# Patient Record
Sex: FEMALE | Race: WHITE | Employment: OTHER | URBAN - NONMETROPOLITAN AREA
[De-identification: names, ages, dates, MRNs, and addresses within clinical notes are randomized per-mention and may not be internally consistent; named-entity substitution may affect disease eponyms.]

---

## 2017-12-14 ENCOUNTER — HOSPITAL ENCOUNTER (OUTPATIENT)
Dept: MAMMOGRAPHY | Age: 62
Discharge: HOME | End: 2017-12-14
Payer: MEDICARE

## 2017-12-14 DIAGNOSIS — Z12.31 VISIT FOR SCREENING MAMMOGRAM: ICD-10-CM

## 2017-12-14 PROCEDURE — G0202 SCR MAMMO BI INCL CAD: HCPCS

## 2018-05-12 ENCOUNTER — APPOINTMENT (OUTPATIENT)
Dept: GENERAL RADIOLOGY | Age: 63
End: 2018-05-12
Payer: MEDICARE

## 2018-05-12 ENCOUNTER — HOSPITAL ENCOUNTER (EMERGENCY)
Age: 63
Discharge: HOME | End: 2018-05-12
Payer: MEDICARE

## 2018-05-12 VITALS
RESPIRATION RATE: 20 BRPM | TEMPERATURE: 98.1 F | OXYGEN SATURATION: 100 % | HEART RATE: 100 BPM | DIASTOLIC BLOOD PRESSURE: 80 MMHG | SYSTOLIC BLOOD PRESSURE: 134 MMHG | BODY MASS INDEX: 23.99 KG/M2 | HEIGHT: 65 IN | WEIGHT: 144 LBS

## 2018-05-12 DIAGNOSIS — R05.3 COUGH, PERSISTENT: Primary | ICD-10-CM

## 2018-05-12 PROCEDURE — 71046 X-RAY EXAM CHEST 2 VIEWS: CPT

## 2018-05-12 PROCEDURE — 99283 EMERGENCY DEPT VISIT LOW MDM: CPT

## 2018-05-12 RX ORDER — BENZONATATE 100 MG/1
100 CAPSULE ORAL 3 TIMES DAILY PRN
Qty: 21 CAPSULE | Refills: 0 | Status: SHIPPED | OUTPATIENT
Start: 2018-05-12 | End: 2018-05-19

## 2018-05-12 ASSESSMENT — ENCOUNTER SYMPTOMS
RHINORRHEA: 1
BACK PAIN: 0
EYE PAIN: 0
SHORTNESS OF BREATH: 0
VOMITING: 0
WHEEZING: 0
SORE THROAT: 0
ABDOMINAL PAIN: 0
DIARRHEA: 0
EYE DISCHARGE: 0
NAUSEA: 0
COUGH: 1

## 2018-12-20 ENCOUNTER — HOSPITAL ENCOUNTER (OUTPATIENT)
Dept: MAMMOGRAPHY | Age: 63
Discharge: HOME | End: 2018-12-20
Payer: MEDICARE

## 2018-12-20 DIAGNOSIS — Z12.39 BREAST CANCER SCREENING: ICD-10-CM

## 2018-12-20 PROCEDURE — 77067 SCR MAMMO BI INCL CAD: CPT

## 2018-12-28 ENCOUNTER — HOSPITAL ENCOUNTER (OUTPATIENT)
Dept: WOMENS IMAGING | Age: 63
Discharge: HOME | End: 2018-12-28
Payer: MEDICARE

## 2018-12-28 DIAGNOSIS — R92.1 BREAST CALCIFICATIONS: ICD-10-CM

## 2018-12-28 PROCEDURE — G0279 TOMOSYNTHESIS, MAMMO: HCPCS

## 2019-01-22 ENCOUNTER — HOSPITAL ENCOUNTER (OUTPATIENT)
Dept: WOMENS IMAGING | Age: 64
Discharge: HOME | End: 2019-01-22
Payer: MEDICARE

## 2019-01-22 DIAGNOSIS — R92.1 BREAST CALCIFICATIONS: ICD-10-CM

## 2019-01-22 PROCEDURE — 77065 DX MAMMO INCL CAD UNI: CPT

## 2019-01-22 PROCEDURE — A4648 IMPLANTABLE TISSUE MARKER: HCPCS

## 2019-01-22 PROCEDURE — 19081 BX BREAST 1ST LESION STRTCTC: CPT

## 2019-01-22 PROCEDURE — 88305 TISSUE EXAM BY PATHOLOGIST: CPT

## 2019-01-22 PROCEDURE — 2720000010 HC SURG SUPPLY STERILE

## 2019-01-22 PROCEDURE — 2709999900 HC NON-CHARGEABLE SUPPLY

## 2019-07-24 ENCOUNTER — HOSPITAL ENCOUNTER (OUTPATIENT)
Dept: WOMENS IMAGING | Age: 64
Discharge: HOME | End: 2019-07-24
Payer: MEDICARE

## 2019-07-24 DIAGNOSIS — Z09 FOLLOW-UP EXAM: ICD-10-CM

## 2019-07-24 DIAGNOSIS — R92.1 BREAST CALCIFICATION SEEN ON MAMMOGRAM: ICD-10-CM

## 2019-07-24 PROCEDURE — G0279 TOMOSYNTHESIS, MAMMO: HCPCS

## 2020-01-10 ENCOUNTER — HOSPITAL ENCOUNTER (OUTPATIENT)
Dept: WOMENS IMAGING | Age: 65
Discharge: HOME | End: 2020-01-10
Payer: MEDICARE

## 2020-01-10 PROCEDURE — 77063 BREAST TOMOSYNTHESIS BI: CPT

## 2020-11-15 ENCOUNTER — HOSPITAL ENCOUNTER (EMERGENCY)
Age: 65
Discharge: HOME | End: 2020-11-16
Payer: MEDICARE

## 2020-11-15 VITALS
RESPIRATION RATE: 17 BRPM | SYSTOLIC BLOOD PRESSURE: 137 MMHG | TEMPERATURE: 97.8 F | BODY MASS INDEX: 23.99 KG/M2 | WEIGHT: 144 LBS | DIASTOLIC BLOOD PRESSURE: 71 MMHG | OXYGEN SATURATION: 99 % | HEART RATE: 110 BPM | HEIGHT: 65 IN

## 2020-11-15 PROCEDURE — 80053 COMPREHEN METABOLIC PANEL: CPT

## 2020-11-15 PROCEDURE — G0480 DRUG TEST DEF 1-7 CLASSES: HCPCS

## 2020-11-15 PROCEDURE — 84443 ASSAY THYROID STIM HORMONE: CPT

## 2020-11-15 PROCEDURE — 82248 BILIRUBIN DIRECT: CPT

## 2020-11-15 PROCEDURE — 99284 EMERGENCY DEPT VISIT MOD MDM: CPT

## 2020-11-15 PROCEDURE — 36415 COLL VENOUS BLD VENIPUNCTURE: CPT

## 2020-11-15 PROCEDURE — 85025 COMPLETE CBC W/AUTO DIFF WBC: CPT

## 2020-11-16 ENCOUNTER — APPOINTMENT (OUTPATIENT)
Dept: CT IMAGING | Age: 65
End: 2020-11-16
Payer: MEDICARE

## 2020-11-16 LAB
ACETAMINOPHEN LEVEL: < 5 UG/ML (ref 0–20)
ALBUMIN SERPL-MCNC: 4.2 G/DL (ref 3.5–5.1)
ALP BLD-CCNC: 90 U/L (ref 38–126)
ALT SERPL-CCNC: 27 U/L (ref 11–66)
AMMONIA: 23 UMOL/L (ref 11–60)
ANION GAP SERPL CALCULATED.3IONS-SCNC: 15 MEQ/L (ref 8–16)
AST SERPL-CCNC: 23 U/L (ref 5–40)
BASOPHILS # BLD: 0.5 %
BASOPHILS ABSOLUTE: 0.1 THOU/MM3 (ref 0–0.1)
BILIRUB SERPL-MCNC: 0.2 MG/DL (ref 0.3–1.2)
BILIRUBIN DIRECT: < 0.2 MG/DL (ref 0–0.3)
BUN BLDV-MCNC: 13 MG/DL (ref 7–22)
CALCIUM SERPL-MCNC: 9.7 MG/DL (ref 8.5–10.5)
CHLORIDE BLD-SCNC: 101 MEQ/L (ref 98–111)
CO2: 23 MEQ/L (ref 23–33)
CREAT SERPL-MCNC: 0.8 MG/DL (ref 0.4–1.2)
EOSINOPHIL # BLD: 2.3 %
EOSINOPHILS ABSOLUTE: 0.3 THOU/MM3 (ref 0–0.4)
ERYTHROCYTE [DISTWIDTH] IN BLOOD BY AUTOMATED COUNT: 12.3 % (ref 11.5–14.5)
ERYTHROCYTE [DISTWIDTH] IN BLOOD BY AUTOMATED COUNT: 40.1 FL (ref 35–45)
ETHYL ALCOHOL, SERUM: < 0.01 %
GFR SERPL CREATININE-BSD FRML MDRD: 72 ML/MIN/1.73M2
GLUCOSE BLD-MCNC: 180 MG/DL (ref 70–108)
HCT VFR BLD CALC: 40.3 % (ref 37–47)
HEMOGLOBIN: 13.4 GM/DL (ref 12–16)
IMMATURE GRANS (ABS): 0.06 THOU/MM3 (ref 0–0.07)
IMMATURE GRANULOCYTES: 0.5 %
LYMPHOCYTES # BLD: 18.2 %
LYMPHOCYTES ABSOLUTE: 2 THOU/MM3 (ref 1–4.8)
MCH RBC QN AUTO: 29.9 PG (ref 26–33)
MCHC RBC AUTO-ENTMCNC: 33.3 GM/DL (ref 32.2–35.5)
MCV RBC AUTO: 90 FL (ref 81–99)
MONOCYTES # BLD: 5.9 %
MONOCYTES ABSOLUTE: 0.7 THOU/MM3 (ref 0.4–1.3)
NUCLEATED RED BLOOD CELLS: 0 /100 WBC
OSMOLALITY CALCULATION: 282.2 MOSMOL/KG (ref 275–300)
PLATELET # BLD: 281 THOU/MM3 (ref 130–400)
PMV BLD AUTO: 9.7 FL (ref 9.4–12.4)
POTASSIUM SERPL-SCNC: 3.1 MEQ/L (ref 3.5–5.2)
RBC # BLD: 4.48 MILL/MM3 (ref 4.2–5.4)
SALICYLATE, SERUM: < 0.3 MG/DL (ref 2–10)
SEG NEUTROPHILS: 72.6 %
SEGMENTED NEUTROPHILS ABSOLUTE COUNT: 8.1 THOU/MM3 (ref 1.8–7.7)
SODIUM BLD-SCNC: 139 MEQ/L (ref 135–145)
TOTAL PROTEIN: 6.9 G/DL (ref 6.1–8)
TSH SERPL DL<=0.05 MIU/L-ACNC: 6.33 UIU/ML (ref 0.4–4.2)
WBC # BLD: 11.1 THOU/MM3 (ref 4.8–10.8)

## 2020-11-16 PROCEDURE — 36415 COLL VENOUS BLD VENIPUNCTURE: CPT

## 2020-11-16 PROCEDURE — 82140 ASSAY OF AMMONIA: CPT

## 2020-11-16 PROCEDURE — 70450 CT HEAD/BRAIN W/O DYE: CPT

## 2020-11-16 ASSESSMENT — SLEEP AND FATIGUE QUESTIONNAIRES
DO YOU HAVE DIFFICULTY SLEEPING: NO
DO YOU USE A SLEEP AID: NO

## 2020-11-16 NOTE — ED NOTES
Pt to ED via private vehicle. Daughter states that her mother has been placed on ativan 4x daily for anxiety, but that she does not trust her to take her medications responsibility so she gives it to her when appropriate. Today, the daughter forgot the medications on the counter and when she went back inside, about 15 pills were missing. Daughter does not believe the pt ingested them and pt also denies ingesting them, both believing they just got flushed. Daughter also states that recently the pt has been hallucinating, stating that she is seeing bugs crawling on the walls or seeing things that aren't there. Pt states this is not true. Daughter brought her here today because she is concerned she is \"sliping\" back into her old problems. Pt denies any suicidal/homicidal thoughts or feelings. Pt denying pain at this time.  Being monitored by staff     Nella Junior  11/16/20 0002

## 2020-11-16 NOTE — ED NOTES
Pt in bed with both side rails down. Patient is in bed with blankets. Patient reports. Patient is in ligature resistant safe room and officer/ are in the control room watching the monitor at this time. Patient updated on plan of care. Will continue to monitor.        Frank Manning RN  11/16/20 1926

## 2020-11-16 NOTE — ED NOTES
Pt in bed with both side rails down. Patient is in bed with blankets. Patient reports. Patient is in ligature resistant safe room and officer/ are in the control room watching the monitor at this time. Patient updated on plan of care. Will continue to monitor.        Placido Fajardo RN  11/16/20 0939

## 2020-11-16 NOTE — PROGRESS NOTES
Provisional Diagnosis:    Bi-polar Disorder, per history     Risk, Psychosocial and Contextual Factors:  Situational Stressors    Current MH Treatment:  PCP      Present Suicidal Behavior:      Verbal:    Denies          Attempt:   Denies      Access to Weapons:   Denies    Current Suicide Risk: Low, Moderate or High:    Low    Past Suicidal Behavior:       Verbal:    Denies    Attempt:   Denies     Self-Injurious/Self-Mutilation:  Denies     Traumatic Event Within Past 2 Weeks:   Denies    Current Abuse:   Denies     Legal:     Denies    Violence:    Denies     Protective Factors: Motivated    Housing:       Lives with daughter and boyfriend     CPAP/Oxygen/Ambulation Difficulties:     Basic Vital Signs Normal?: Check with Patients Nurse prior to Calling Psychiatry    Critical Labs?: Check with Patients Nurse prior to Calling Psychiatry    Clinical Summary:      Patient is a 63year old female who presents to the ED voluntarily due to hallucinations. Patients daughter reports patient has been hallucinating and stating she is seeing bugs. Patient hallucinations at this time. Patients daughter also reports 15 pills were missing from patients medication bottle. Patients daughter and patient denies patient ingested medication. Patients daughter reports she is also concerned patient will begin to have seizures due to her history. Patient reports she was previously admitted and having seizures due to not having medication. Homicidal thoughts and/or plans denied. No delusions noted. AOD denied. Patient alert and oriented x4. Patient cooperative at this time. Level of Care Disposition:      Consulted with medical provider Chen Pompa. Patient is medically cleared. Consulted with patients RN about abnormalities or medical concerns. No abnormalities or medical concerns noted. Consulted with Dr. Jesus Kendall. Patient to be discharged and follow up outpatient.

## 2020-11-18 ASSESSMENT — ENCOUNTER SYMPTOMS
CHEST TIGHTNESS: 0
BACK PAIN: 0
COUGH: 0
RHINORRHEA: 0

## 2020-11-18 NOTE — ED PROVIDER NOTES
[facility] Emergency Department    CHIEF COMPLAINT       Chief Complaint   Patient presents with    Hallucinations       Nurses Notes reviewed and I agree except as noted in the HPI. HISTORY OF PRESENT ILLNESS    Ailyn Donato truman 63 y.o. female who presents to the ED for evaluation of  Hallucinations. Her daughter brought her to the ER because she states that her mom has been having odd behaviors such as cleaning around the couch in a [place], \"trimming\" the carpet, and repeatedly tearing apart a purse because she believes there are pills in the purse. The daughter states that she has control of the patient's benzos because of the patient's history of misuse. The daughter states that she left the pills on the counter for a few minutes and came back and found that there were 15-20 pills missing. The patient denies taking them. The daughter is concerned the patient will have a seizure like she did 4 years ago (patient was admitted in acute benzo withdrawal). The patient denies SI/HI, AH/VH. HPI was provided by the patient. REVIEW OF SYSTEMS     Review of Systems   Constitutional: Negative for chills, fatigue and fever. HENT: Negative for congestion, ear discharge, ear pain, postnasal drip and rhinorrhea. Respiratory: Negative for cough and chest tightness. Genitourinary: Negative for difficulty urinating, dysuria, enuresis, flank pain and hematuria. Musculoskeletal: Negative for back pain and joint swelling. Neurological: Negative for dizziness, light-headedness, numbness and headaches. Psychiatric/Behavioral: Positive for agitation, behavioral problems, dysphoric mood and hallucinations. Negative for confusion and suicidal ideas.         PAST MEDICAL HISTORY     Past Medical History:   Diagnosis Date    Anxiety     [facility] (chronic obstructive pulmonary disease) ([facility] [address]     GERD (gastroesophageal reflux disease)     Hyperlipemia     Hypertension     Psychiatric problem     Seizures ([facility] [address]     Thyroid disease        SURGICALHISTORY      has a past surgical history that includes Abdomen surgery; Colonoscopy; and eye surgery (2010). CURRENT MEDICATIONS       Discharge Medication List as of 2020  2:30 AM      CONTINUE these medications which have NOT CHANGED    Details   aspirin 81 MG EC tablet Take 1 tablet by mouth daily      atorvastatin (LIPITOR) 40 MG tablet Take 1 tablet by mouth every evening, Disp-30 tablet, R-0      nortriptyline (PAMELOR) 75 MG capsule Take 75 mg by mouth nightly      PARoxetine (PAXIL) 20 MG tablet Take 20 mg by mouth every morning      budesonide-formoterol (SYMBICORT) 160-4.5 MCG/ACT AERO Inhale 2 puffs into the lungs 2 times daily      lisinopril (PRINIVIL;ZESTRIL) 10 MG tablet Take 5 mg by mouth daily      levothyroxine (SYNTHROID) 50 MCG tablet Take 1 tablet by mouth Daily, Disp-30 tablet, R-3      omeprazole (PRILOSEC) 20 MG capsule Take 20 mg by mouth 2 times daily as needed       fluticasone (FLONASE) 50 MCG/ACT nasal spray 2 sprays by Nasal route daily Indications: Use two sprays in each nostril once daily             ALLERGIES     is allergic to penicillins. FAMILY HISTORY     She indicated that her mother is . She indicated that her father is . She indicated that the status of her neg hx is unknown.   family history includes Heart Disease in her mother; High Blood Pressure in her mother; High Cholesterol in her mother; Kidney Disease in her father.     SOCIAL HISTORY       Social History     Socioeconomic History    Marital status:      Spouse name: Not on file    Number of children: Not on file    Years of education: Not on file    Highest education level: Not on file   Occupational History    Not on file   Social Needs    Financial resource strain: Not on file    Food insecurity     Worry: Not on file     Inability: Not on file    Transportation needs     Medical: Not on file     Non-medical: Not on file   Tobacco Use    Smoking status: Former Smoker     Packs/day: 0.50     Years: 3.00     Pack years: 1.50    Smokeless tobacco: Never Used   Substance and Sexual Activity    Alcohol use: No    Drug use: No    Sexual activity: Not Currently   Lifestyle    Physical activity     Days per week: Not on file     Minutes per session: Not on file    Stress: Not on file   Relationships    Social connections     Talks on phone: Not on file     Gets together: Not on file     Attends [employer] service: Not on file     Active member of club or organization: Not on file     Attends meetings of clubs or organizations: Not on file     Relationship status: Not on file    Intimate partner violence     Fear of current or ex partner: Not on file     Emotionally abused: Not on file     Physically abused: Not on file     Forced sexual activity: Not on file   Other Topics Concern    Not on file   Social History Narrative    Not on file       PHYSICAL EXAM     INITIAL VITALS:  height is 5' 5\" (1.651 m) and weight is 144 lb (65.3 kg). Her oral temperature is 97.8 °F (36.6 °C). Her blood pressure is 137/71 and her pulse is 110. Her respiration is 17 and oxygen saturation is 99%. Physical Exam  Vitals signs and nursing note reviewed. Constitutional:       General: She is not in acute distress. Appearance: Normal appearance. She is well-developed. She is not diaphoretic. Comments: Disheveled   HENT:      Head: Normocephalic and atraumatic. Eyes:      General:         Right eye: No discharge. Left eye: No discharge. Conjunctiva/sclera: Conjunctivae normal.   Neck:      Musculoskeletal: Normal range of motion. Trachea: No tracheal deviation. Cardiovascular:      Rate and Rhythm: Regular rhythm. Tachycardia present. Heart sounds: Normal heart sounds. No murmur. No gallop. Comments: Normal capillary refill  Pulmonary:      Effort: Pulmonary effort is normal. No respiratory distress. PANEL - Abnormal; Notable for the following components:       Result Value    Potassium 3.1 (*)     Glucose 180 (*)     All other components within normal limits   CBC WITH AUTO DIFFERENTIAL - Abnormal; Notable for the following components:    WBC 11.1 (*)     Segs Absolute 8.1 (*)     All other components within normal limits   HEPATIC FUNCTION PANEL - Abnormal; Notable for the following components: Total Bilirubin 0.2 (*)     All other components within normal limits   TSH WITHOUT REFLEX - Abnormal; Notable for the following components:    TSH 6.330 (*)     All other components within normal limits   SALICYLATE LEVEL - Abnormal; Notable for the following components:    Salicylate, Serum < 0.3 (*)     All other components within normal limits   GLOMERULAR FILTRATION RATE, ESTIMATED - Abnormal; Notable for the following components:    Est, Glom Filt Rate 72 (*)     All other components within normal limits   ACETAMINOPHEN LEVEL   ETHANOL   AMMONIA   ANION GAP   OSMOLALITY   URINE DRUG SCREEN   URINE RT REFLEX TO CULTURE       EMERGENCY DEPARTMENT COURSE:   Vitals:    Vitals:    11/15/20 2331   BP: 137/71   Pulse: 110   Resp: 17   Temp: 97.8 °F (36.6 °C)   TempSrc: Oral   SpO2: 99%   Weight: 144 lb (65.3 kg)   Height: 5' 5\" (1.651 m)          [facility]                  ED Course as of Nov 18 0443   Mon Nov 16, 2020   0225 Patient declines to provide urine. Declines straight cath    [KJ]      ED Course User Index  [KJ] SUSAN Eid CNP       The patient was seen and evaluated within the ED today for the evaluation of suicidal ideation. Physical exam revealed no significant abnormalities or concerns. I completed a medical evaluation of the patient and ordered appropriate labs which were unremarkable. CRISTI and social work completed a full psychiatric evaluation of the patient and determined that she met discharge criteria. I medically cleared the patient.  CRISTI and social work's noted should be consulted for the psychiatric evaluation and reason for discharge. I reviewed the family's concerns for seizure with them. Four years ago the patient had benzodiazapine withdrawal because she was taking her entire months worth of benzos within a week. I explained the mechanism of benzo withdrawal and educated daughter about the importance of her not stopping benzo use suddenly. They verbalized understanding and are comfortable with discharge. The patient denies her daughter's reports of hallucinations but does report having some thoughts about her floor being dirty etc.         Medications - No data to display      Patient was seenindependently by myself. The patient's final impression and disposition and plan was determined by myself. CRITICAL CARE:   None    CONSULTS:  None    PROCEDURES:  None    FINAL IMPRESSION     1. Abnormal behavior    2. History of benzodiazepine use    3. Obsessive behavior    4.  Hallucination          DISPOSITION/PLAN   DISPOSITION Decision To Discharge 11/16/2020 02:26:16 AM      PATIENT REFERREDTO:  Erma Armendariz 68  [facility] [address] [phone]    Schedule an appointment as soon as possible for a visit in 2 days  For follow up      DISCHARGE MEDICATIONS:  Discharge Medication List as of 11/16/2020  2:30 AM          (Please note that portions of this note were completed with a voice recognition program.  Efforts were made to edit the dictations but occasionally words are mis-transcribed.)         SUSAN Lopes CNP, APRN - CNP  11/18/20 7468

## 2021-01-18 ENCOUNTER — HOSPITAL ENCOUNTER (OUTPATIENT)
Dept: MAMMOGRAPHY | Age: 66
Discharge: HOME | End: 2021-01-18
Payer: MEDICARE

## 2021-01-18 DIAGNOSIS — Z12.31 ENCOUNTER FOR SCREENING MAMMOGRAM FOR MALIGNANT NEOPLASM OF BREAST: ICD-10-CM

## 2021-01-18 PROCEDURE — 77063 BREAST TOMOSYNTHESIS BI: CPT

## 2021-06-25 ENCOUNTER — HOSPITAL ENCOUNTER (OUTPATIENT)
Age: 66
Setting detail: OBSERVATION
Discharge: HOME | End: 2021-06-27
Attending: INTERNAL MEDICINE | Admitting: INTERNAL MEDICINE
Payer: MEDICARE

## 2021-06-25 ENCOUNTER — APPOINTMENT (OUTPATIENT)
Dept: GENERAL RADIOLOGY | Age: 66
End: 2021-06-25
Payer: MEDICARE

## 2021-06-25 ENCOUNTER — APPOINTMENT (OUTPATIENT)
Dept: CT IMAGING | Age: 66
End: 2021-06-25
Payer: MEDICARE

## 2021-06-25 DIAGNOSIS — F13.932 BENZODIAZEPINE WITHDRAWAL WITH PERCEPTUAL DISTURBANCE (HCC): Primary | ICD-10-CM

## 2021-06-25 LAB
ALBUMIN SERPL-MCNC: 4.5 G/DL (ref 3.5–5.1)
ALP BLD-CCNC: 91 U/L (ref 38–126)
ALT SERPL-CCNC: 19 U/L (ref 11–66)
ANION GAP SERPL CALCULATED.3IONS-SCNC: 15 MEQ/L (ref 8–16)
APTT: 29.5 SECONDS (ref 22–38)
AST SERPL-CCNC: 17 U/L (ref 5–40)
BASOPHILS # BLD: 0.4 %
BASOPHILS ABSOLUTE: 0 THOU/MM3 (ref 0–0.1)
BILIRUB SERPL-MCNC: 0.2 MG/DL (ref 0.3–1.2)
BILIRUBIN DIRECT: < 0.2 MG/DL (ref 0–0.3)
BUN BLDV-MCNC: 11 MG/DL (ref 7–22)
CALCIUM SERPL-MCNC: 10 MG/DL (ref 8.5–10.5)
CHLORIDE BLD-SCNC: 102 MEQ/L (ref 98–111)
CO2: 25 MEQ/L (ref 23–33)
CREAT SERPL-MCNC: 0.7 MG/DL (ref 0.4–1.2)
EOSINOPHIL # BLD: 0.5 %
EOSINOPHILS ABSOLUTE: 0.1 THOU/MM3 (ref 0–0.4)
ERYTHROCYTE [DISTWIDTH] IN BLOOD BY AUTOMATED COUNT: 12.9 % (ref 11.5–14.5)
ERYTHROCYTE [DISTWIDTH] IN BLOOD BY AUTOMATED COUNT: 42.4 FL (ref 35–45)
GFR SERPL CREATININE-BSD FRML MDRD: 84 ML/MIN/1.73M2
GLUCOSE BLD-MCNC: 178 MG/DL (ref 70–108)
HCT VFR BLD CALC: 42.4 % (ref 37–47)
HEMOGLOBIN: 13.3 GM/DL (ref 12–16)
IMMATURE GRANS (ABS): 0.05 THOU/MM3 (ref 0–0.07)
IMMATURE GRANULOCYTES: 0.4 %
INR BLD: 0.97 (ref 0.85–1.13)
LIPASE: 25.7 U/L (ref 5.6–51.3)
LYMPHOCYTES # BLD: 11.7 %
LYMPHOCYTES ABSOLUTE: 1.4 THOU/MM3 (ref 1–4.8)
MAGNESIUM: 1.6 MG/DL (ref 1.6–2.4)
MCH RBC QN AUTO: 28.1 PG (ref 26–33)
MCHC RBC AUTO-ENTMCNC: 31.4 GM/DL (ref 32.2–35.5)
MCV RBC AUTO: 89.5 FL (ref 81–99)
MONOCYTES # BLD: 5.1 %
MONOCYTES ABSOLUTE: 0.6 THOU/MM3 (ref 0.4–1.3)
NUCLEATED RED BLOOD CELLS: 0 /100 WBC
OSMOLALITY CALCULATION: 286.9 MOSMOL/KG (ref 275–300)
PLATELET # BLD: 286 THOU/MM3 (ref 130–400)
PMV BLD AUTO: 10 FL (ref 9.4–12.4)
POTASSIUM SERPL-SCNC: 3.5 MEQ/L (ref 3.5–5.2)
PRO-BNP: 87.8 PG/ML (ref 0–900)
RBC # BLD: 4.74 MILL/MM3 (ref 4.2–5.4)
SEG NEUTROPHILS: 81.9 %
SEGMENTED NEUTROPHILS ABSOLUTE COUNT: 9.7 THOU/MM3 (ref 1.8–7.7)
SODIUM BLD-SCNC: 142 MEQ/L (ref 135–145)
TOTAL PROTEIN: 7.4 G/DL (ref 6.1–8)
TROPONIN T: < 0.01 NG/ML
TSH SERPL DL<=0.05 MIU/L-ACNC: 3.46 UIU/ML (ref 0.4–4.2)
WBC # BLD: 11.9 THOU/MM3 (ref 4.8–10.8)

## 2021-06-25 PROCEDURE — 85610 PROTHROMBIN TIME: CPT

## 2021-06-25 PROCEDURE — 83735 ASSAY OF MAGNESIUM: CPT

## 2021-06-25 PROCEDURE — 83880 ASSAY OF NATRIURETIC PEPTIDE: CPT

## 2021-06-25 PROCEDURE — 93005 ELECTROCARDIOGRAM TRACING: CPT | Performed by: NURSE PRACTITIONER

## 2021-06-25 PROCEDURE — 82248 BILIRUBIN DIRECT: CPT

## 2021-06-25 PROCEDURE — 84443 ASSAY THYROID STIM HORMONE: CPT

## 2021-06-25 PROCEDURE — 70450 CT HEAD/BRAIN W/O DYE: CPT

## 2021-06-25 PROCEDURE — 99283 EMERGENCY DEPT VISIT LOW MDM: CPT

## 2021-06-25 PROCEDURE — 80053 COMPREHEN METABOLIC PANEL: CPT

## 2021-06-25 PROCEDURE — 2580000003 HC RX 258: Performed by: NURSE PRACTITIONER

## 2021-06-25 PROCEDURE — 83690 ASSAY OF LIPASE: CPT

## 2021-06-25 PROCEDURE — 85730 THROMBOPLASTIN TIME PARTIAL: CPT

## 2021-06-25 PROCEDURE — 84484 ASSAY OF TROPONIN QUANT: CPT

## 2021-06-25 PROCEDURE — 36415 COLL VENOUS BLD VENIPUNCTURE: CPT

## 2021-06-25 PROCEDURE — 96374 THER/PROPH/DIAG INJ IV PUSH: CPT

## 2021-06-25 PROCEDURE — 85025 COMPLETE CBC W/AUTO DIFF WBC: CPT

## 2021-06-25 PROCEDURE — 71045 X-RAY EXAM CHEST 1 VIEW: CPT

## 2021-06-25 RX ORDER — LORAZEPAM 1 MG/1
1 TABLET ORAL 3 TIMES DAILY
COMMUNITY

## 2021-06-25 RX ORDER — LORAZEPAM 2 MG/ML
0.5 INJECTION INTRAMUSCULAR ONCE
Status: COMPLETED | OUTPATIENT
Start: 2021-06-26 | End: 2021-06-26

## 2021-06-25 RX ORDER — 0.9 % SODIUM CHLORIDE 0.9 %
1000 INTRAVENOUS SOLUTION INTRAVENOUS ONCE
Status: COMPLETED | OUTPATIENT
Start: 2021-06-25 | End: 2021-06-26

## 2021-06-25 RX ADMIN — SODIUM CHLORIDE 1000 ML: 9 INJECTION, SOLUTION INTRAVENOUS at 23:23

## 2021-06-25 ASSESSMENT — ENCOUNTER SYMPTOMS
CHEST TIGHTNESS: 0
CONSTIPATION: 0
SINUS PRESSURE: 0
NAUSEA: 1
SINUS PAIN: 0
SORE THROAT: 0
VOICE CHANGE: 0
VOMITING: 0
PHOTOPHOBIA: 0
EYE PAIN: 0
RHINORRHEA: 0
ABDOMINAL PAIN: 0
COUGH: 0
DIARRHEA: 0
STRIDOR: 0
SHORTNESS OF BREATH: 1
COLOR CHANGE: 0
BACK PAIN: 0
WHEEZING: 0

## 2021-06-25 NOTE — Clinical Note
Patient Class: Observation [104]   REQUIRED: Diagnosis: Benzodiazepine withdrawal with perceptual disturbance ([facility] [address] [[id number]]   Estimated Length of Stay: Estimated stay of less than 2 midnights   Admitting Provider: Timo Fernandez [[id number]]   Telemetry/Cardiac Monitoring Required?: Yes

## 2021-06-26 PROBLEM — F13.932 BENZODIAZEPINE WITHDRAWAL WITH PERCEPTUAL DISTURBANCE (HCC): Status: ACTIVE | Noted: 2021-06-26

## 2021-06-26 LAB
AMPHETAMINE+METHAMPHETAMINE URINE SCREEN: NEGATIVE
AMPHETAMINE+METHAMPHETAMINE URINE SCREEN: NEGATIVE
BACTERIA: ABNORMAL /HPF
BARBITURATE QUANTITATIVE URINE: NEGATIVE
BARBITURATE QUANTITATIVE URINE: NEGATIVE
BENZODIAZEPINE QUANTITATIVE URINE: NEGATIVE
BENZODIAZEPINE QUANTITATIVE URINE: NEGATIVE
BILIRUBIN URINE: NEGATIVE
BLOOD, URINE: NEGATIVE
CANNABINOID QUANTITATIVE URINE: NEGATIVE
CANNABINOID QUANTITATIVE URINE: NEGATIVE
CASTS 2: ABNORMAL /LPF
CASTS UA: ABNORMAL /LPF
CHARACTER, URINE: CLEAR
COCAINE METABOLITE QUANTITATIVE URINE: NEGATIVE
COCAINE METABOLITE QUANTITATIVE URINE: NEGATIVE
COLOR: YELLOW
CRYSTALS, UA: ABNORMAL
EKG ATRIAL RATE: 107 BPM
EKG P AXIS: 42 DEGREES
EKG P-R INTERVAL: 152 MS
EKG Q-T INTERVAL: 348 MS
EKG QRS DURATION: 106 MS
EKG QTC CALCULATION (BAZETT): 464 MS
EKG R AXIS: 10 DEGREES
EKG T AXIS: 25 DEGREES
EKG VENTRICULAR RATE: 107 BPM
EPITHELIAL CELLS, UA: ABNORMAL /HPF
GLUCOSE BLD-MCNC: 161 MG/DL (ref 70–108)
GLUCOSE BLD-MCNC: 72 MG/DL (ref 70–108)
GLUCOSE BLD-MCNC: 92 MG/DL (ref 70–108)
GLUCOSE BLD-MCNC: 94 MG/DL (ref 70–108)
GLUCOSE URINE: NEGATIVE MG/DL
KETONES, URINE: NEGATIVE
LEUKOCYTE ESTERASE, URINE: ABNORMAL
MISCELLANEOUS 2: ABNORMAL
MUCUS: ABNORMAL
NITRITE, URINE: NEGATIVE
OPIATES, URINE: NEGATIVE
OPIATES, URINE: NEGATIVE
OXYCODONE: NEGATIVE
OXYCODONE: NEGATIVE
PH UA: 7.5 (ref 5–9)
PHENCYCLIDINE QUANTITATIVE URINE: NEGATIVE
PHENCYCLIDINE QUANTITATIVE URINE: POSITIVE
PROTEIN UA: NEGATIVE
RBC URINE: ABNORMAL /HPF
RENAL EPITHELIAL, UA: ABNORMAL
SPECIFIC GRAVITY, URINE: 1.02 (ref 1–1.03)
UROBILINOGEN, URINE: 1 EU/DL (ref 0–1)
WBC UA: ABNORMAL /HPF
YEAST: ABNORMAL

## 2021-06-26 PROCEDURE — 96375 TX/PRO/DX INJ NEW DRUG ADDON: CPT

## 2021-06-26 PROCEDURE — 96372 THER/PROPH/DIAG INJ SC/IM: CPT

## 2021-06-26 PROCEDURE — 96365 THER/PROPH/DIAG IV INF INIT: CPT

## 2021-06-26 PROCEDURE — 82948 REAGENT STRIP/BLOOD GLUCOSE: CPT

## 2021-06-26 PROCEDURE — G0378 HOSPITAL OBSERVATION PER HR: HCPCS

## 2021-06-26 PROCEDURE — 87186 SC STD MICRODIL/AGAR DIL: CPT

## 2021-06-26 PROCEDURE — 93010 ELECTROCARDIOGRAM REPORT: CPT | Performed by: INTERNAL MEDICINE

## 2021-06-26 PROCEDURE — 80307 DRUG TEST PRSMV CHEM ANLYZR: CPT

## 2021-06-26 PROCEDURE — 87086 URINE CULTURE/COLONY COUNT: CPT

## 2021-06-26 PROCEDURE — 87077 CULTURE AEROBIC IDENTIFY: CPT

## 2021-06-26 PROCEDURE — 2580000003 HC RX 258: Performed by: PHYSICIAN ASSISTANT

## 2021-06-26 PROCEDURE — 6360000002 HC RX W HCPCS: Performed by: NURSE PRACTITIONER

## 2021-06-26 PROCEDURE — 81001 URINALYSIS AUTO W/SCOPE: CPT

## 2021-06-26 PROCEDURE — 6370000000 HC RX 637 (ALT 250 FOR IP): Performed by: PHYSICIAN ASSISTANT

## 2021-06-26 PROCEDURE — 96376 TX/PRO/DX INJ SAME DRUG ADON: CPT

## 2021-06-26 PROCEDURE — 2580000003 HC RX 258: Performed by: STUDENT IN AN ORGANIZED HEALTH CARE EDUCATION/TRAINING PROGRAM

## 2021-06-26 PROCEDURE — 6370000000 HC RX 637 (ALT 250 FOR IP): Performed by: STUDENT IN AN ORGANIZED HEALTH CARE EDUCATION/TRAINING PROGRAM

## 2021-06-26 PROCEDURE — 99203 OFFICE O/P NEW LOW 30 MIN: CPT | Performed by: PSYCHIATRY & NEUROLOGY

## 2021-06-26 PROCEDURE — 6360000002 HC RX W HCPCS: Performed by: STUDENT IN AN ORGANIZED HEALTH CARE EDUCATION/TRAINING PROGRAM

## 2021-06-26 RX ORDER — VENLAFAXINE HYDROCHLORIDE 150 MG/1
150 CAPSULE, EXTENDED RELEASE ORAL DAILY
COMMUNITY

## 2021-06-26 RX ORDER — PANTOPRAZOLE SODIUM 40 MG/1
40 TABLET, DELAYED RELEASE ORAL
Status: DISCONTINUED | OUTPATIENT
Start: 2021-06-27 | End: 2021-06-27 | Stop reason: HOSPADM

## 2021-06-26 RX ORDER — SODIUM CHLORIDE 0.9 % (FLUSH) 0.9 %
5-40 SYRINGE (ML) INJECTION EVERY 12 HOURS SCHEDULED
Status: DISCONTINUED | OUTPATIENT
Start: 2021-06-26 | End: 2021-06-27 | Stop reason: HOSPADM

## 2021-06-26 RX ORDER — SENNA PLUS 8.6 MG/1
1 TABLET ORAL 2 TIMES DAILY PRN
Status: DISCONTINUED | OUTPATIENT
Start: 2021-06-26 | End: 2021-06-27 | Stop reason: HOSPADM

## 2021-06-26 RX ORDER — POLYETHYLENE GLYCOL 3350 17 G/17G
17 POWDER, FOR SOLUTION ORAL DAILY PRN
Status: DISCONTINUED | OUTPATIENT
Start: 2021-06-26 | End: 2021-06-27 | Stop reason: HOSPADM

## 2021-06-26 RX ORDER — NICOTINE POLACRILEX 4 MG
15 LOZENGE BUCCAL PRN
Status: DISCONTINUED | OUTPATIENT
Start: 2021-06-26 | End: 2021-06-27 | Stop reason: HOSPADM

## 2021-06-26 RX ORDER — AMITRIPTYLINE HYDROCHLORIDE 150 MG/1
150 TABLET, FILM COATED ORAL NIGHTLY
COMMUNITY

## 2021-06-26 RX ORDER — 0.9 % SODIUM CHLORIDE 0.9 %
500 INTRAVENOUS SOLUTION INTRAVENOUS ONCE
Status: COMPLETED | OUTPATIENT
Start: 2021-06-26 | End: 2021-06-26

## 2021-06-26 RX ORDER — LORAZEPAM 2 MG/ML
4 INJECTION INTRAMUSCULAR
Status: DISCONTINUED | OUTPATIENT
Start: 2021-06-26 | End: 2021-06-27 | Stop reason: HOSPADM

## 2021-06-26 RX ORDER — SODIUM CHLORIDE 9 MG/ML
25 INJECTION, SOLUTION INTRAVENOUS PRN
Status: DISCONTINUED | OUTPATIENT
Start: 2021-06-26 | End: 2021-06-27 | Stop reason: HOSPADM

## 2021-06-26 RX ORDER — DEXTROSE MONOHYDRATE 25 G/50ML
12.5 INJECTION, SOLUTION INTRAVENOUS PRN
Status: DISCONTINUED | OUTPATIENT
Start: 2021-06-26 | End: 2021-06-27 | Stop reason: HOSPADM

## 2021-06-26 RX ORDER — LORAZEPAM 2 MG/ML
3 INJECTION INTRAMUSCULAR
Status: DISCONTINUED | OUTPATIENT
Start: 2021-06-26 | End: 2021-06-27 | Stop reason: HOSPADM

## 2021-06-26 RX ORDER — DEXTROSE MONOHYDRATE 50 MG/ML
100 INJECTION, SOLUTION INTRAVENOUS PRN
Status: DISCONTINUED | OUTPATIENT
Start: 2021-06-26 | End: 2021-06-27 | Stop reason: HOSPADM

## 2021-06-26 RX ORDER — ACETAMINOPHEN 325 MG/1
650 TABLET ORAL EVERY 6 HOURS PRN
Status: DISCONTINUED | OUTPATIENT
Start: 2021-06-26 | End: 2021-06-27 | Stop reason: HOSPADM

## 2021-06-26 RX ORDER — AMLODIPINE BESYLATE 5 MG/1
5 TABLET ORAL DAILY
Status: DISCONTINUED | OUTPATIENT
Start: 2021-06-26 | End: 2021-06-27 | Stop reason: HOSPADM

## 2021-06-26 RX ORDER — AMITRIPTYLINE HYDROCHLORIDE 75 MG/1
150 TABLET, FILM COATED ORAL NIGHTLY
Status: DISCONTINUED | OUTPATIENT
Start: 2021-06-26 | End: 2021-06-27 | Stop reason: HOSPADM

## 2021-06-26 RX ORDER — LORAZEPAM 2 MG/ML
2 INJECTION INTRAMUSCULAR
Status: DISCONTINUED | OUTPATIENT
Start: 2021-06-26 | End: 2021-06-27 | Stop reason: HOSPADM

## 2021-06-26 RX ORDER — ACETAMINOPHEN 650 MG/1
650 SUPPOSITORY RECTAL EVERY 6 HOURS PRN
Status: DISCONTINUED | OUTPATIENT
Start: 2021-06-26 | End: 2021-06-27 | Stop reason: HOSPADM

## 2021-06-26 RX ORDER — VENLAFAXINE HYDROCHLORIDE 150 MG/1
150 CAPSULE, EXTENDED RELEASE ORAL DAILY
Status: DISCONTINUED | OUTPATIENT
Start: 2021-06-26 | End: 2021-06-27 | Stop reason: HOSPADM

## 2021-06-26 RX ORDER — CETIRIZINE HYDROCHLORIDE 10 MG/1
10 TABLET ORAL DAILY
Status: DISCONTINUED | OUTPATIENT
Start: 2021-06-26 | End: 2021-06-27 | Stop reason: HOSPADM

## 2021-06-26 RX ORDER — LORAZEPAM 1 MG/1
1 TABLET ORAL
Status: DISCONTINUED | OUTPATIENT
Start: 2021-06-26 | End: 2021-06-27 | Stop reason: HOSPADM

## 2021-06-26 RX ORDER — SODIUM CHLORIDE 9 MG/ML
INJECTION, SOLUTION INTRAVENOUS CONTINUOUS
Status: DISCONTINUED | OUTPATIENT
Start: 2021-06-26 | End: 2021-06-27 | Stop reason: HOSPADM

## 2021-06-26 RX ORDER — THIAMINE HYDROCHLORIDE 100 MG/ML
100 INJECTION, SOLUTION INTRAMUSCULAR; INTRAVENOUS DAILY
Status: DISCONTINUED | OUTPATIENT
Start: 2021-06-26 | End: 2021-06-26

## 2021-06-26 RX ORDER — LEVOTHYROXINE SODIUM 0.05 MG/1
50 TABLET ORAL DAILY
Status: DISCONTINUED | OUTPATIENT
Start: 2021-06-27 | End: 2021-06-27 | Stop reason: HOSPADM

## 2021-06-26 RX ORDER — LORAZEPAM 2 MG/ML
1 INJECTION INTRAMUSCULAR
Status: DISCONTINUED | OUTPATIENT
Start: 2021-06-26 | End: 2021-06-27 | Stop reason: HOSPADM

## 2021-06-26 RX ORDER — SODIUM CHLORIDE 0.9 % (FLUSH) 0.9 %
5-40 SYRINGE (ML) INJECTION PRN
Status: DISCONTINUED | OUTPATIENT
Start: 2021-06-26 | End: 2021-06-27 | Stop reason: HOSPADM

## 2021-06-26 RX ORDER — LISINOPRIL 5 MG/1
5 TABLET ORAL DAILY
Status: DISCONTINUED | OUTPATIENT
Start: 2021-06-26 | End: 2021-06-27 | Stop reason: HOSPADM

## 2021-06-26 RX ORDER — CETIRIZINE HYDROCHLORIDE 10 MG/1
10 TABLET ORAL DAILY
COMMUNITY

## 2021-06-26 RX ORDER — FLUTICASONE PROPIONATE 50 MCG
2 SPRAY, SUSPENSION (ML) NASAL DAILY
Status: DISCONTINUED | OUTPATIENT
Start: 2021-06-26 | End: 2021-06-27 | Stop reason: HOSPADM

## 2021-06-26 RX ORDER — MAGNESIUM HYDROXIDE/ALUMINUM HYDROXICE/SIMETHICONE 120; 1200; 1200 MG/30ML; MG/30ML; MG/30ML
30 SUSPENSION ORAL EVERY 6 HOURS PRN
Status: DISCONTINUED | OUTPATIENT
Start: 2021-06-26 | End: 2021-06-27 | Stop reason: HOSPADM

## 2021-06-26 RX ORDER — ONDANSETRON 2 MG/ML
4 INJECTION INTRAMUSCULAR; INTRAVENOUS EVERY 6 HOURS PRN
Status: DISCONTINUED | OUTPATIENT
Start: 2021-06-26 | End: 2021-06-27 | Stop reason: HOSPADM

## 2021-06-26 RX ORDER — LORAZEPAM 1 MG/1
3 TABLET ORAL
Status: DISCONTINUED | OUTPATIENT
Start: 2021-06-26 | End: 2021-06-27 | Stop reason: HOSPADM

## 2021-06-26 RX ORDER — MAGNESIUM SULFATE IN WATER 40 MG/ML
2000 INJECTION, SOLUTION INTRAVENOUS PRN
Status: DISCONTINUED | OUTPATIENT
Start: 2021-06-26 | End: 2021-06-27 | Stop reason: HOSPADM

## 2021-06-26 RX ORDER — ATORVASTATIN CALCIUM 40 MG/1
40 TABLET, FILM COATED ORAL EVERY EVENING
Status: DISCONTINUED | OUTPATIENT
Start: 2021-06-26 | End: 2021-06-27 | Stop reason: HOSPADM

## 2021-06-26 RX ORDER — ONDANSETRON 4 MG/1
4 TABLET, ORALLY DISINTEGRATING ORAL EVERY 8 HOURS PRN
Status: DISCONTINUED | OUTPATIENT
Start: 2021-06-26 | End: 2021-06-27 | Stop reason: HOSPADM

## 2021-06-26 RX ORDER — POTASSIUM CHLORIDE 20 MEQ/1
40 TABLET, EXTENDED RELEASE ORAL PRN
Status: DISCONTINUED | OUTPATIENT
Start: 2021-06-26 | End: 2021-06-27 | Stop reason: HOSPADM

## 2021-06-26 RX ORDER — ALENDRONATE SODIUM 70 MG/1
70 TABLET ORAL
COMMUNITY

## 2021-06-26 RX ORDER — ZOLPIDEM TARTRATE 10 MG/1
10 TABLET ORAL NIGHTLY
COMMUNITY

## 2021-06-26 RX ORDER — POTASSIUM CHLORIDE 7.45 MG/ML
10 INJECTION INTRAVENOUS PRN
Status: DISCONTINUED | OUTPATIENT
Start: 2021-06-26 | End: 2021-06-27 | Stop reason: HOSPADM

## 2021-06-26 RX ORDER — METFORMIN HYDROCHLORIDE 750 MG/1
750 TABLET, EXTENDED RELEASE ORAL
COMMUNITY

## 2021-06-26 RX ORDER — MULTIVITAMIN WITH IRON
1 TABLET ORAL DAILY
Status: DISCONTINUED | OUTPATIENT
Start: 2021-06-26 | End: 2021-06-27 | Stop reason: HOSPADM

## 2021-06-26 RX ORDER — LORAZEPAM 1 MG/1
2 TABLET ORAL
Status: DISCONTINUED | OUTPATIENT
Start: 2021-06-26 | End: 2021-06-27 | Stop reason: HOSPADM

## 2021-06-26 RX ORDER — LORAZEPAM 1 MG/1
4 TABLET ORAL
Status: DISCONTINUED | OUTPATIENT
Start: 2021-06-26 | End: 2021-06-27 | Stop reason: HOSPADM

## 2021-06-26 RX ADMIN — ATORVASTATIN CALCIUM 40 MG: 40 TABLET, FILM COATED ORAL at 20:32

## 2021-06-26 RX ADMIN — Medication 1 TABLET: at 18:16

## 2021-06-26 RX ADMIN — LISINOPRIL 5 MG: 5 TABLET ORAL at 20:32

## 2021-06-26 RX ADMIN — CETIRIZINE HYDROCHLORIDE 10 MG: 10 TABLET, FILM COATED ORAL at 15:14

## 2021-06-26 RX ADMIN — SODIUM CHLORIDE: 9 INJECTION, SOLUTION INTRAVENOUS at 15:33

## 2021-06-26 RX ADMIN — SODIUM CHLORIDE, PRESERVATIVE FREE 10 ML: 5 INJECTION INTRAVENOUS at 08:40

## 2021-06-26 RX ADMIN — ENOXAPARIN SODIUM 40 MG: 40 INJECTION SUBCUTANEOUS at 08:41

## 2021-06-26 RX ADMIN — VENLAFAXINE HYDROCHLORIDE 150 MG: 150 CAPSULE, EXTENDED RELEASE ORAL at 15:13

## 2021-06-26 RX ADMIN — POTASSIUM CHLORIDE 40 MEQ: 1500 TABLET, EXTENDED RELEASE ORAL at 08:49

## 2021-06-26 RX ADMIN — SODIUM CHLORIDE: 9 INJECTION, SOLUTION INTRAVENOUS at 20:35

## 2021-06-26 RX ADMIN — SODIUM CHLORIDE 500 ML: 9 INJECTION, SOLUTION INTRAVENOUS at 15:33

## 2021-06-26 RX ADMIN — INSULIN LISPRO 1 UNITS: 100 INJECTION, SOLUTION INTRAVENOUS; SUBCUTANEOUS at 18:16

## 2021-06-26 RX ADMIN — THIAMINE HYDROCHLORIDE 100 MG: 100 INJECTION, SOLUTION INTRAMUSCULAR; INTRAVENOUS at 08:41

## 2021-06-26 RX ADMIN — LORAZEPAM 1 MG: 2 INJECTION INTRAMUSCULAR; INTRAVENOUS at 08:40

## 2021-06-26 RX ADMIN — AMITRIPTYLINE HYDROCHLORIDE 150 MG: 75 TABLET, FILM COATED ORAL at 20:32

## 2021-06-26 RX ADMIN — AMLODIPINE BESYLATE 5 MG: 5 TABLET ORAL at 18:16

## 2021-06-26 RX ADMIN — LORAZEPAM 0.5 MG: 2 INJECTION INTRAMUSCULAR; INTRAVENOUS at 00:03

## 2021-06-26 ASSESSMENT — PAIN SCALES - GENERAL
PAINLEVEL_OUTOF10: 0

## 2021-06-26 NOTE — ED PROVIDER NOTES
[place] [address]       Chief Complaint   Patient presents with    Shortness of Breath     \"not feeling good\"    Numbness     in feet        Nurses Notes reviewed and I agree except as noted in the HPI. HISTORY OF PRESENT ILLNESS    Laurne Donato truman 61 y.o. female who presents to the ED for evaluation of shortness of breath, lightheadedness, and bilateral feet numbness. She states she was going home from dinner with her  and she noticed she did not feel well. She was having some difficulty breathing and had noticed some numbness in her feet bilaterally. When she got home her  states she was leaning over on her knees and breathing hard. When she went to the bathroom she was leaning over and holding on to the sink so she did not fall. She admits mild nausea but no vomiting. She denies any fever or change in urinary habits. She denies any prior anemia. Her  states she is notorious for overtaking her ativan. He keeps her medication locked up so she cannot get into them. There are still occasions she gets into them and will take pills out. A couple weeks ago when he was doing a pill count there were 30 pills missing. He states she gets respiratory depression when she overtakes her medication. He states this is not like that and she has not had access to her medications since. HPI was provided by the patient and spouse/significant other    REVIEW OF SYSTEMS     Review of Systems   Constitutional: Positive for activity change. Negative for appetite change, chills, diaphoresis, fatigue and fever. HENT: Negative for congestion, hearing loss, rhinorrhea, sinus pressure, sinus pain, sneezing, sore throat, tinnitus and voice change. Eyes: Negative for photophobia, pain and visual disturbance. Respiratory: Positive for shortness of breath. Negative for cough, chest tightness, wheezing and stridor.     Cardiovascular: Negative for chest pain, palpitations and leg swelling. Gastrointestinal: Positive for nausea. Negative for abdominal pain, constipation, diarrhea and vomiting. Genitourinary: Negative for difficulty urinating, dysuria, flank pain, frequency, hematuria and urgency. Musculoskeletal: Negative for arthralgias, back pain, gait problem, myalgias, neck pain and neck stiffness. Skin: Negative for color change, pallor, rash and wound. Neurological: Positive for dizziness, tremors, light-headedness and numbness. Negative for seizures, syncope, speech difficulty, weakness and headaches. Psychiatric/Behavioral: Negative for agitation, behavioral problems, confusion, decreased concentration, dysphoric mood, hallucinations and self-injury. The patient is nervous/anxious and is hyperactive. All other systems negative except as noted. PAST MEDICAL HISTORY     Past Medical History:   Diagnosis Date    Anxiety     [facility]) [facility] [address]     GERD (gastroesophageal reflux disease)     Hyperlipidemia     Hypertension     Psychiatric problem     Seizures ([facility] [address]     Thyroid disease        SURGICALHISTORY      has a past surgical history that includes Abdomen surgery; Colonoscopy; and eye surgery (2010 approx). CURRENT MEDICATIONS       Discharge Medication List as of 6/27/2021  2:20 PM      CONTINUE these medications which have NOT CHANGED    Details   metFORMIN (GLUCOPHAGE-XR) 750 MG extended release tablet Take 750 mg by mouth Daily with supperHistorical Med      amitriptyline (ELAVIL) 150 MG tablet Take 150 mg by mouth nightlyHistorical Med      venlafaxine (EFFEXOR XR) 150 MG extended release capsule Take 150 mg by mouth dailyHistorical Med      cetirizine (ZYRTEC) 10 MG tablet Take 10 mg by mouth dailyHistorical Med      zolpidem (AMBIEN) 10 MG tablet Take 10 mg by mouth nightly. Historical Med      alendronate (FOSAMAX) 70 MG tablet Take 70 mg by mouth every 7 daysHistorical Med      LORazepam Transportation (Medical):  Lack of Transportation (Non-Medical):    Physical Activity:     Days of Exercise per Week:     Minutes of Exercise per Session:    Stress:     Feeling of Stress :    Social Connections:     Frequency of Communication with Friends and Family:     Frequency of Social Gatherings with Friends and Family:     Attends [employer] Services:     Active Member of Clubs or Organizations:     Attends Club or Organization Meetings:     Marital Status:    Intimate Partner Violence:     Fear of Current or Ex-Partner:     Emotionally Abused:     Physically Abused:     Sexually Abused:        PHYSICAL EXAM     INITIAL VITALS:  height is 5' 5\" (1.651 m) and weight is 137 lb (62.1 kg). Her oral temperature is 98.1 °F (36.7 °C). Her blood pressure is 151/87 (abnormal) and her pulse is 101. Her respiration is 18 and oxygen saturation is 92%. Physical Exam  Constitutional:       General: She is not in acute distress. Appearance: She is well-developed. HENT:      Head: Normocephalic and atraumatic. Mouth/Throat:      Mouth: Mucous membranes are dry. Pharynx: Oropharynx is clear. Eyes:      Extraocular Movements: Extraocular movements intact. Pupils: Pupils are equal, round, and reactive to light. Cardiovascular:      Rate and Rhythm: Normal rate and regular rhythm. Pulses: Normal pulses. Heart sounds: Normal heart sounds. Pulmonary:      Effort: Pulmonary effort is normal. No tachypnea, accessory muscle usage or respiratory distress. Breath sounds: Normal breath sounds. No stridor. Chest:      Chest wall: No deformity or tenderness. Abdominal:      General: Bowel sounds are normal.      Palpations: Abdomen is soft. Tenderness: There is no abdominal tenderness. There is no guarding. Musculoskeletal:         General: Normal range of motion. Cervical back: Normal range of motion and neck supple. Right lower leg: No tenderness.  No edema. Left lower leg: No tenderness. No edema. Skin:     General: Skin is warm and dry. Capillary Refill: Capillary refill takes less than 2 seconds. Coloration: Skin is pale. Neurological:      General: No focal deficit present. Mental Status: She is alert and oriented to person, place, and time. Cranial Nerves: Cranial nerves are intact. Sensory: Sensation is intact. Motor: Tremor present. No seizure activity. Coordination: Coordination is intact. Gait: Gait is intact. Comments: Lip smacking   Psychiatric:         Attention and Perception: Attention normal.         Mood and Affect: Mood is anxious. Speech: Speech normal.         Behavior: Behavior is hyperactive. Behavior is not agitated. Behavior is cooperative. Thought Content:  Thought content normal.         Cognition and Memory: Cognition and memory normal.         DIFFERENTIAL DIAGNOSIS:   Medication overdose, benzo withdrawal, neurocognitive disorder    DIAGNOSTIC RESULTS     EKG: All EKG's are interpreted by the Emergency Department Physician who eithersigns or Co-signs this chart in the absence of a cardiologist.     EKG 12 Lead (Final result)   Component (Lab Inquiry)  Collection Time Result Time Ventricular Rate Atrial Rate P-R Interval QRS Duration Q-T Interval QTc Calculation (Bazett) P Axis R Axis T Axis   06/25/21 23:17:12 06/26/21 15:03:16 107 107 152 106 348 464 42 10 25   Previous Results   08/22/16 13:42:39 08/22/16 17:40:46 102 102 176 94 360 469 52 57 43   08/08/16 18:38:45 08/09/16 13:27:09 102 102 160 92 364 474 48 49 56   06/13/16 17:23:44 06/14/16 21:01:39 106 106 160 96 340 451 61 56 67   06/13/16 17:23:44 06/13/16 21:44:45 106 106 160 96 340 451 61 56 67   05/29/16 15:37:56 05/30/16 09:59:53 107 107 160 88 340 453 64 67 67         Final result                Narrative:    Sinus tachycardia   Possible Left atrial enlargement   Minimal voltage criteria for LVH, may be Est, Glom Filt Rate 84 (*)     All other components within normal limits   URINE WITH REFLEXED MICRO - Abnormal; Notable for the following components:    Leukocyte Esterase, Urine MODERATE (*)     All other components within normal limits   BASIC METABOLIC PANEL W/ REFLEX TO MG FOR LOW K - Abnormal; Notable for the following components:    Glucose 128 (*)     BUN 6 (*)     All other components within normal limits   CBC WITH AUTO DIFFERENTIAL - Abnormal; Notable for the following components:    MCHC 31.4 (*)     All other components within normal limits   ANION GAP - Abnormal; Notable for the following components:    Anion Gap 7.0 (*)     All other components within normal limits   POCT GLUCOSE - Abnormal; Notable for the following components:    POC Glucose 161 (*)     All other components within normal limits   POCT GLUCOSE - Abnormal; Notable for the following components:    POC Glucose 118 (*)     All other components within normal limits   POCT GLUCOSE - Abnormal; Notable for the following components:    POC Glucose 110 (*)     All other components within normal limits   APTT   BRAIN NATRIURETIC PEPTIDE   LIPASE   PROTIME-INR   MAGNESIUM   TROPONIN   TSH WITH REFLEX   URINE DRUG SCREEN   ANION GAP   OSMOLALITY   URINE DRUG SCREEN   GLOMERULAR FILTRATION RATE, ESTIMATED   POCT GLUCOSE   POCT GLUCOSE   POCT GLUCOSE       EMERGENCY DEPARTMENT COURSE:   Vitals:    Vitals:    06/27/21 0725 06/27/21 0800 06/27/21 1154 06/27/21 1201   BP: (!) 133/93  (!) 151/87    Pulse: 91 86 93 101   Resp: 18  18    Temp: 98 °F (36.7 °C)  98.1 °F (36.7 °C)    TempSrc: Oral  Oral    SpO2: 96%  92%    Weight:       Height:              MDM                         MDM    Patient seen evaluate the emergency room for altered mental status. Patient is a chronic long-term benzodiazepine user. Spouse states that she has been out for the last several days.   He states that somehow the patient got a hold of her bottle of benzos and took representative isaware of care plan, questions answered, verbalizes understanding and is in agreement. CRITICAL CARE:   None    CONSULTS:  Hospitalist    PROCEDURES:  None    FINAL IMPRESSION     1.  Benzodiazepine withdrawal with perceptual disturbance [facility])          DISPOSITION/PLAN   DISPOSITION Admitted 06/26/2021 03:12:56 AM      PATIENT REFERREDTO:  Jack Calderon  [phone]    Schedule an appointment as soon as possible for a visit        DISCHARGE MEDICATIONS:  Discharge Medication List as of 6/27/2021  2:20 PM      START taking these medications    Details   amLODIPine (NORVASC) 5 MG tablet Take 1 tablet by mouth daily Hold for SBP less than 100 mmHg, Disp-30 tablet, R-3Normal      Multiple Vitamin (MULTIVITAMIN) TABS tablet Take 1 tablet by mouth daily, Disp-30 tablet, R-0Normal      vitamin B-1 (THIAMINE) 100 MG tablet Take 1 tablet by mouth daily, Disp-30 tablet, L-[id number]      folic acid (FOLVITE) 1 MG tablet Take 1 tablet by mouth daily, Disp-30 tablet, R-0Normal             (Please note that portions of this note were completed with a voice recognition program.  Efforts were made to edit the dictations but occasionally words are mis-transcribed.)         SUSAN Villegas - SUSAN Baker CNP  06/30/21 3708

## 2021-06-26 NOTE — PLAN OF CARE
Problem: Falls - Risk of:  Goal: Will remain free from falls  Description: Will remain free from falls  Outcome: Ongoing  Note: Call light is within reach, hourly rounding is done. Goal: Absence of physical injury  Description: Absence of physical injury  Outcome: Ongoing     Problem: Safety:  Goal: Ability to remain free from injury will improve  Description: Ability to remain free from injury will improve  Outcome: Ongoing  Note: Seizure precaution in place.

## 2021-06-26 NOTE — PLAN OF CARE
Problem: Falls - Risk of:  Goal: Will remain free from falls  Description: Will remain free from falls  6/26/2021 1719 by Joslyn Tinoco RN  Outcome: Ongoing  6/26/2021 0459 by Ronnie Reece RN  Outcome: Ongoing  Note: Call light is within reach, hourly rounding is done. Goal: Absence of physical injury  Description: Absence of physical injury  6/26/2021 1719 by Joslyn Tinoco RN  Outcome: Ongoing  6/26/2021 0459 by Ronnie Reece RN  Outcome: Ongoing     Problem: Safety:  Goal: Ability to remain free from injury will improve  Description: Ability to remain free from injury will improve  6/26/2021 1719 by Joslyn Tinoco RN  Outcome: Ongoing  6/26/2021 0459 by Ronnie Reece RN  Outcome: Ongoing  Note: Seizure precaution in place.

## 2021-06-26 NOTE — FLOWSHEET NOTE
Pt admitted to  8B25  Complaints: Shortness of breath. IV none infusing into the forearm right, condition patent and no redness. IV site free of s/s of infection or infiltration. Vital signs obtained. Assessment and data collection initiated. Two nurse skin assessment performed by [facility] RN and Zach Hooker RN. Oriented to room. Policies and procedures for 6K explained. Onyi RN discussed hourly rounding with patient addressing 5 P's. Fall prevention and safety brochure discussed with patient. Bed alarm on. Call light in reach. The best day to schedule a follow up Dr appointment is:  Monday a.m. Explained patients right to have family, representative or physician notified of their admission. Patient has Declined for physician to be notified. Patient has Declined for family/representative to be notified. All questions answered with no further questions at this time.

## 2021-06-26 NOTE — H&P
Hospitalist - History & Physical      Patient: Noreen Sanchez    Unit/Bed:[facility] Krystin Nelson  YOB: 1955  MRN: [id number]   Acct: [de-identified]   PCP: Drew Castanon    Date of Service: Pt seen/examined on 06/26/21  and Admitted to Observation with expected LOS less than two midnights due to medical therapy. Chief Complaint: Shortness of breath    Assessment and Plan:-  1. Benzodiazepine withdrawal -patient gets prescription for 90 pills/month from [facility] , apparently she took all 80 in 10 days or so, withdrawing in ED with some odd behavior, anxiety, tremors, lipsmacking. Has a history of seizures from withdrawal in the past.  We will start her on CIWA protocol, seizure precautions. Psychiatry consulted to establish intent. 2. UDS positive for PCP use -expecting this to wear off eventually. 3. Altered mental status 2/2 benzo withdrawal and PCP use -CT head nonacute. Limited neuro exam because uncooperative, overall grossly benign. 4. COPD -continue home inhalers  5. GERD -continue PPI  6. HTN -uncontrolled, in the setting of benzodiazepine withdrawal.      History Of Present Illness: This is a 60-year-old female with PMH of chronic benzodiazepine dependence with history of withdrawal with seizures, possible PCP use per UDS, COPD(not on home oxygen, uses inhalers), GERD, HTN who presented to the ED withdrawing from benzos. Apparently, patient gets a prescription from [facility] of 1 mg Ativan 90 count/month. Review of MAR indicates that patient took all 90 or pills within 10 days or so, says that now she ran out. In the ED, patient is notably altered, first appeared snowed, followed by altered behavior exhibited with lipsmacking, tremors, confusion, talking to people in the room who were not there. Patient endorsed shortness of breath, numbness in her feet, lightheadedness. Patient does have some unclear psychiatric disorder, will get psychiatry on board to establish intent. Hospitalist team were contacted for admission. Patient admitted to observation. CT head normal.    Past Medical History:        Diagnosis Date    Anxiety     [facility] [address]     GERD (gastroesophageal reflux disease)     Hyperlipemia     Hypertension     Psychiatric problem     Seizures ([facility] [address]     Thyroid disease        Past Surgical History:        Procedure Laterality Date    ABDOMEN SURGERY      csection x 2    COLONOSCOPY      EYE SURGERY  2010 approx    cataract removal       Home Medications:   No current facility-administered medications on file prior to encounter. Current Outpatient Medications on File Prior to Encounter   Medication Sig Dispense Refill    LORazepam (ATIVAN) 1 MG tablet Take 1 mg by mouth every 6 hours as needed for Anxiety.  atorvastatin (LIPITOR) 40 MG tablet Take 1 tablet by mouth every evening 30 tablet 0    lisinopril (PRINIVIL;ZESTRIL) 10 MG tablet Take 5 mg by mouth daily      levothyroxine (SYNTHROID) 50 MCG tablet Take 1 tablet by mouth Daily 30 tablet 3    omeprazole (PRILOSEC) 20 MG capsule Take 20 mg by mouth 2 times daily as needed       fluticasone (FLONASE) 50 MCG/ACT nasal spray 2 sprays by Nasal route daily Indications: Use two sprays in each nostril once daily         Allergies:    Penicillins    Social History:    reports that she has quit smoking. She has a 1.50 pack-year smoking history. She has never used smokeless tobacco. She reports that she does not drink alcohol and does not use drugs. Family History:       Problem Relation Age of Onset    Heart Disease Mother     High Blood Pressure Mother     High Cholesterol Mother     Kidney Disease Father     Breast Cancer Neg Hx     Ovarian Cancer Neg Hx        Diet:  Diet NPO    Review of systems:   Pertinent positives as noted in the HPI. All other systems reviewed and negative.     PHYSICAL EXAM:  BP (!) 159/82   Pulse 86   Temp 98.2 °F (36.8 °C) (Oral)   Resp 18   Ht 5' 5\" (1.651 m)   Wt 120 lb (54.4 kg)   LMP  (LMP Unknown)   SpO2 97%   BMI 19.97 kg/m²   General appearance: No apparent distress, appears anxious, stated age and cooperative. HEENT: Normal cephalic, atraumatic without obvious deformity. Facial grimacing noted. Dry oral mucosa. Neck: No jugular venous distention. Trachea midline. Respiratory:  Normal respiratory effort. Air entry bilaterally. Cardiovascular: Regular rate and rhythm with normal S1/S2 without murmurs, rubs or gallops. Abdomen: Soft, non-tender, non-distended with normal bowel sounds. Musculoskeletal:  No clubbing, cyanosis or edema bilaterally. Skin: Skin color, texture, turgor normal.  BLE ankle red streaks. Neurologic: Limited neuro exam, patient uncooperative. Neurovascularly intact without any gross focal sensory/motor deficits. Uncontrollable facial grimacing noted. Tremor bilateral extremities present. Does respond by name, no seizure activity. Psychiatric:  thought content abnormal, anxious, abnormal insight. Capillary Refill: Brisk,< 3 seconds   Peripheral Pulses: +2 palpable, equal bilaterally     Labs:   Recent Labs     06/25/21 2232   WBC 11.9*   HGB 13.3   HCT 42.4        Recent Labs     06/25/21 2232      K 3.5      CO2 25   BUN 11   CREATININE 0.7   CALCIUM 10.0     Recent Labs     06/25/21 2232   AST 17   ALT 19   BILIDIR <0.2   BILITOT 0.2*   ALKPHOS 91     Recent Labs     06/25/21 2232   INR 0.97     No results for input(s): Kenroy Seed in the last 72 hours. Urinalysis:    Lab Results   Component Value Date    NITRU NEGATIVE 06/26/2021    WBCUA 15-25 06/26/2021    BACTERIA NONE SEEN 06/26/2021    RBCUA 0-2 06/26/2021    BLOODU NEGATIVE 06/26/2021    SPECGRAV <1.005 06/13/2016    GLUCOSEU NEGATIVE 06/26/2021       Radiology:   CT HEAD WO CONTRAST   Final Result   Impression:   1. No acute intracranial abnormality.   Age-appropriate exam.   2.  Stable intracranial exam.      This document has been electronically signed by: Charlotte Mireles MD on    06/26/2021 12:53 AM      All CTs at this facility use dose modulation techniques and iterative    reconstructions, and/or weight-based dosing   when appropriate to reduce radiation to a low as reasonably achievable. XR CHEST PORTABLE   Final Result   No acute findings. This document has been electronically signed by: Christiano Herman MD on    06/25/2021 10:46 PM        CT HEAD WO CONTRAST    Result Date: 6/26/2021  CT head without: Comparison:  CT,SR  - CT HEAD WO CONTRAST  - 11/16/2020 12:35 AM EST Findings: Age-appropriate sulcation. No intracranial mass, midline shift, hydrocephalus, acute hemorrhage or infarct. Unremarkable orbits. Visualized paranasal sinuses are clear. Mastoid air cells are clear. No skull fracture. No significant scalp soft tissue swelling. Impression: 1. No acute intracranial abnormality. Age-appropriate exam. 2.  Stable intracranial exam. This document has been electronically signed by: Charlotte Mireles MD on 06/26/2021 12:53 AM All CTs at this facility use dose modulation techniques and iterative reconstructions, and/or weight-based dosing when appropriate to reduce radiation to a low as reasonably achievable. XR CHEST PORTABLE    Result Date: 6/25/2021  Chest X-ray, 1 View COMPARISON:  CR,SR  - XR CHEST STANDARD (2 VW)  - 05/12/2018 07:34 PM EDT FINDINGS: No consolidation. Mild atelectasis at the left lung base. No pleural effusion. No pneumothorax. No cardiomegaly. No acute fracture. No acute findings.  This document has been electronically signed by: Christiano Herman MD on 06/25/2021 10:46 PM        EKG: Sinus tachycardia    Electronically signed by Marciano Garcia MD on 6/26/2021 at 3:33 AM

## 2021-06-26 NOTE — ED NOTES
ED to inpatient nurses report    Chief Complaint   Patient presents with    Shortness of Breath     \"not feeling good\"    Numbness     in feet       Present to ED from home  LOC: alert and orientated to name, place, date  Vital signs   Vitals:    06/26/21 0215 06/26/21 0230 06/26/21 0245 06/26/21 0300   BP: (!) 158/79 (!) 146/90 (!) 155/90 (!) 159/82   Pulse:       Resp:       Temp:       TempSrc:       SpO2: 97% 97% 96% 96%   Weight:       Height:          Oxygen Baseline room air    Current needs required room air Bipap/Cpap No  LDAs:   Peripheral IV 06/25/21 Forearm (Active)   Site Assessment Clean; Intact;Dry 06/25/21 2323   Line Status Normal saline locked 06/25/21 2323   Dressing Status Clean;Dry; Intact 06/25/21 2323   Dressing Intervention New 06/25/21 2323     Mobility: Independent  Pending ED orders: none  Present condition: stable. Lip smacking.      Electronically signed by Deb Peoples RN on 6/26/2021 at 3:08 AM       Deb Peoples RN  06/26/21 8231

## 2021-06-26 NOTE — PROGRESS NOTES
Hospitalist Progress Note      Patient:  Noreen Sanchez    Unit/Bed:8B-25/025-A  YOB: 1955  MRN: [id number]   Acct: [de-identified]   PCP: Drew Castanon  Date of Admission: 6/25/2021    Assessment/Plan:    1. Concern for benzodiazepine withdrawal: reportedly ran out of her 90 day supply within 10 days. Prescription filled 6/1/21 from [facility] doctor. Pt was initially lethargic on admission but has since improved. She does continue to be very fidgety with pressured speech and continually lip smacking. Intermittent tachycardia. Appreciate psychiatry input. 2. Metabolic encephalopathy, improved: alert and oriented x 3, some haziness in details. 3. COPD without acute exacerbation: Patient with appropriate saturations on room air. No respiratory distress noted. No home inhalers listed. Continue to monitor respiratory status. 4. NIDDM II, controlled: On Metformin at home, hold. Utilize SSI. Accu. Carb control hypoglycemia protocol in place. 5. Acquired hypothyroidism: Continue daily home Synthroid  6. GERD: PPI  7. Substance abuse: UDS + for PCP. Continue with multivitamin, thiamine. 8. Essential HTN: BP appears slightly elevated, resume home Lisinopril. Will add Norvasc 5 mg daily and assess response    9. Anxiety: prescribed Ativan TID 1 mg by [facility] doctor. On Effexor. Psych to evaluate. Chief Complaint: SOB    Initial H and P:-    \"This is a 68-year-old female with PMH of chronic benzodiazepine dependence with history of withdrawal with seizures, possible PCP use per UDS, COPD(not on home oxygen, uses inhalers), GERD, HTN who presented to the ED withdrawing from benzos. Apparently, patient gets a prescription from [facility] of 1 mg Ativan 90 count/month. Review of MAR indicates that patient took all 90 or pills within 10 days or so, says that now she ran out.   In the ED, patient is notably altered, first appeared snowed, followed by altered behavior exhibited with lipsmacking, tremors, confusion, talking to people in the room who were not there. Patient endorsed shortness of breath, numbness in her feet, lightheadedness. Patient does have some unclear psychiatric disorder, will get psychiatry on board to establish intent. Hospitalist team were contacted for admission. Patient admitted to observation.     CT head normal.\"    Subjective (past 24 hours):   6/26-> patient doing okay, sitting up in bed. She is very fidgety and has pressured speech. When asked about PCP in her urine patient got very defensive and her demeanor changed and she said \"this is bull\" and requested a repeat UDS. Pt denies CP/SOB. She does admit to \"sometimes abusing\" her Ativan. She notes that she Pawan Sandy takes 0.5 mg\", however her home dose is 1 mg TID. Psych to evaluate. Past medical history, family history, social history and allergies reviewed again and is unchanged since admission. ROS (12 point review of systems completed. Pertinent positives noted.  Otherwise ROS is negative)     Medications:  Reviewed    Infusion Medications    sodium chloride      dextrose       Scheduled Medications    sodium chloride flush  5-40 mL Intravenous 2 times per day    enoxaparin  40 mg Subcutaneous Daily    multivitamin  1 tablet Oral Daily    thiamine (VITAMIN B1) IVPB  100 mg Intravenous Q24H    insulin lispro  0-6 Units Subcutaneous TID WC    insulin lispro  0-3 Units Subcutaneous Nightly     PRN Meds: sodium chloride flush, sodium chloride, ondansetron **OR** ondansetron, polyethylene glycol, acetaminophen **OR** acetaminophen, potassium chloride **OR** potassium alternative oral replacement **OR** potassium chloride, magnesium sulfate, senna, LORazepam **OR** LORazepam **OR** LORazepam **OR** LORazepam **OR** LORazepam **OR** LORazepam **OR** LORazepam **OR** LORazepam, aluminum & magnesium hydroxide-simethicone, glucose, dextrose, glucagon (rDNA), dextrose    No intake or output data in the 24 hours ending 06/26/21 0900    Diet:  ADULT DIET; Clear Liquid    Exam:  BP (!) 155/87   Pulse 96   Temp 98.6 °F (37 °C) (Oral)   Resp 20   Ht 5' 5\" (1.651 m)   Wt 137 lb (62.1 kg)   LMP  (LMP Unknown)   SpO2 98%   BMI 22.80 kg/m²   General appearance: No apparent distress, appears stated age and cooperative. HEENT: Pupils equal, round, and reactive to light. Conjunctivae/corneas clear. Neck: Supple, with full range of motion. No jugular venous distention. Trachea midline. Respiratory:  Normal respiratory effort. Clear to auscultation, bilaterally without Rales/Wheezes/Rhonchi. Cardiovascular: Regular rate and rhythm with normal S1/S2 without murmurs, rubs or gallops. Abdomen: Soft, non-tender, non-distended with normal bowel sounds. Musculoskeletal: passive and active ROM x 4 extremities. Skin: Skin color, texture, turgor normal.  No rashes or lesions. Neurologic:  Neurovascularly intact without any focal sensory/motor deficits. Cranial nerves: II-XII intact, grossly non-focal.  Psychiatric: Alert and oriented x4, nervous, anxious, fidgety/hyperactive  Capillary Refill: Brisk,< 3 seconds   Peripheral Pulses: +2 palpable, equal bilaterally     Labs:   Recent Labs     06/25/21  2232   WBC 11.9*   HGB 13.3   HCT 42.4        Recent Labs     06/25/21  2232      K 3.5      CO2 25   BUN 11   CREATININE 0.7   CALCIUM 10.0     Recent Labs     06/25/21  2232   AST 17   ALT 19   BILIDIR <0.2   BILITOT 0.2*   ALKPHOS 91     Recent Labs     06/25/21  2232   INR 0.97     No results for input(s): Shellia Bugler in the last 72 hours.     Microbiology:    Blood culture #1:   Lab Results   Component Value Date    BC No growth-preliminary  No growth   08/24/2016    BC No growth-preliminary  No growth   08/24/2016       Blood culture #2:No results found for: Jimbo Dunk    Organism:No results found for: ORG    No results found for: LABGRAM    MRSA culture only:No results found for: [facility]    Urine culture:   Lab Results   Component Value Date    LABURIN  08/22/2016     Growth of Contaminants. The mixture of organisms present  are not a common cause of urinary tract infections and  probably represent skin sumeet or distal urethral sumeet. Respiratory culture: No results found for: CULTRESP    Aerobic and Anaerobic :  No results found for: LABAERO  No results found for: LABANAE    Urinalysis:      Lab Results   Component Value Date    NITRU NEGATIVE 06/26/2021    WBCUA 15-25 06/26/2021    BACTERIA NONE SEEN 06/26/2021    RBCUA 0-2 06/26/2021    BLOODU NEGATIVE 06/26/2021    SPECGRAV <1.005 06/13/2016    GLUCOSEU NEGATIVE 06/26/2021       Radiology:  CT HEAD WO CONTRAST   Final Result   Impression:   1. No acute intracranial abnormality. Age-appropriate exam.   2.  Stable intracranial exam.      This document has been electronically signed by: Chanetta Leyden, MD on    06/26/2021 12:53 AM      All CTs at this facility use dose modulation techniques and iterative    reconstructions, and/or weight-based dosing   when appropriate to reduce radiation to a low as reasonably achievable. XR CHEST PORTABLE   Final Result   No acute findings. This document has been electronically signed by: Nicolas Randolph MD on    06/25/2021 10:46 PM        CT HEAD WO CONTRAST    Result Date: 6/26/2021  CT head without: Comparison:  CT,SR  - CT HEAD WO CONTRAST  - 11/16/2020 12:35 AM EST Findings: Age-appropriate sulcation. No intracranial mass, midline shift, hydrocephalus, acute hemorrhage or infarct. Unremarkable orbits. Visualized paranasal sinuses are clear. Mastoid air cells are clear. No skull fracture. No significant scalp soft tissue swelling. Impression: 1. No acute intracranial abnormality.   Age-appropriate exam. 2.  Stable intracranial exam. This document has been electronically signed by: Chanetta Leyden, MD on 06/26/2021 12:53 AM All CTs at this facility use dose modulation techniques and iterative reconstructions, and/or weight-based dosing when appropriate to reduce radiation to a low as reasonably achievable. XR CHEST PORTABLE    Result Date: 6/25/2021  Chest X-ray, 1 View COMPARISON:  CR,SR  - XR CHEST STANDARD (2 VW)  - 05/12/2018 07:34 PM EDT FINDINGS: No consolidation. Mild atelectasis at the left lung base. No pleural effusion. No pneumothorax. No cardiomegaly. No acute fracture. No acute findings.  This document has been electronically signed by: Betsey Polk MD on 06/25/2021 10:46 PM      Electronically signed by Shazia Fuentes PA-C on 6/26/2021 at 9:00 AM

## 2021-06-26 NOTE — ED TRIAGE NOTES
Patient comes from home with  with complains of generalized sickness. Patient complains of being short of breath. SpO2 of 99% on room air. Patient reports having numbness and tingling in feet at this time. Patient does not have any pain at this time. Patient reports feeling dizzy at this time. Patient feels anxious at this time and can't sit still in chair at this time.  reports patient takes ativan and it is locked up because previous pills have gone missing. No distress noted at this time.

## 2021-06-26 NOTE — CONSULTS
PRN **OR** LORazepam (ATIVAN) tablet 3 mg, 3 mg, Oral, Q1H PRN **OR** LORazepam (ATIVAN) injection 3 mg, 3 mg, Intravenous, Q1H PRN **OR** LORazepam (ATIVAN) tablet 4 mg, 4 mg, Oral, Q1H PRN **OR** LORazepam (ATIVAN) injection 4 mg, 4 mg, Intravenous, Q1H PRN  aluminum & magnesium hydroxide-simethicone (MAALOX) 200-200-20 MG/5ML suspension 30 mL, 30 mL, Oral, Q6H PRN  thiamine (B-1) 100 mg in sodium chloride 0.9 % 100 mL IVPB, 100 mg, Intravenous, Q24H  glucose (GLUTOSE) 40 % oral gel 15 g, 15 g, Oral, PRN  dextrose 50 % IV solution, 12.5 g, Intravenous, PRN  glucagon (rDNA) injection 1 mg, 1 mg, Intramuscular, PRN  dextrose 5 % solution, 100 mL/hr, Intravenous, PRN  insulin lispro (HUMALOG) injection vial 0-6 Units, 0-6 Units, Subcutaneous, TID WC  insulin lispro (HUMALOG) injection vial 0-3 Units, 0-3 Units, Subcutaneous, Nightly  amitriptyline (ELAVIL) tablet 150 mg, 150 mg, Oral, Nightly  cetirizine (ZYRTEC) tablet 10 mg, 10 mg, Oral, Daily  venlafaxine (EFFEXOR XR) extended release capsule 150 mg, 150 mg, Oral, Daily  0.9 % sodium chloride bolus, 500 mL, Intravenous, Once  0.9 % sodium chloride infusion, , Intravenous, Continuous  amLODIPine (NORVASC) tablet 5 mg, 5 mg, Oral, Daily    Drug and Alcohol History:  Alcohol and Benzos    Most Recent [facility] Drug Screen at [facility]:  No components found for: IAMMENTA, IBARBIT, IBENZO, ICOCAINE, IMARTHC, IOPIATES, IPHENCYC    PAST PSYCHIATRIC HISTORY:  Substance Use Disorder    Past psychiatric medications include:  Patient is uncertain of past medications    Adverse reactions from psychotropic medications:  none    Past Medical History:        Diagnosis Date    Anxiety     COPD (chronic obstructive pulmonary disease) (HCC)     GERD (gastroesophageal reflux disease)     Hyperlipidemia     Hypertension     Psychiatric problem     Seizures ([address]     Thyroid disease      Past Surgical History:        Procedure Laterality Date    ABDOMEN SURGERY      csection x 2    COLONOSCOPY      EYE SURGERY  2010 approx    cataract removal     Allergies:  Penicillins    Family History:       Problem Relation Age of Onset    Heart Disease Mother     High Blood Pressure Mother     High Cholesterol Mother     Kidney Disease Father     Breast Cancer Neg Hx     Ovarian Cancer Neg Hx      REVIEW OF SYSTEMS:    See History of Present Illness    PHYSICAL EXAM:    VITALS:  BP (!) 145/87   Pulse 112   Temp 98.2 °F (36.8 °C) (Oral)   Resp 16   Ht 5' 5\" (1.651 m)   Wt 137 lb (62.1 kg)   LMP  (LMP Unknown)   SpO2 97%   BMI 22.80 kg/m²     Mental Status Examination:  Level of consciousness:  Mild dysattention (reduced clarity of awareness with impaired ability to focus, sustain, or shift attention)  Appearance:  ill-appearing  Behavior/Motor:  hyperactive  Attitude toward examiner:  cooperative and attentive  Speech:  rapid  Mood:  anxious  Affect:  mood congruent  Thought processes:  linear and goal directed  Thought content:  Homocidal ideation denies  Suicidal Ideation:  denies suicidal ideation  Delusions:  no evidence of delusions  Perceptual Disturbance:  denies any perceptual disturbance  Cognition:  oriented to person, place, and time  Concentration failed 5-letter word backwards  Memory impaired immediate recall  Insight:  improving  Judgment:  improving        DSM-IV DIAGNOSIS:      Impression (Axis I): Benzodiazepine withdrawals    PLAN:    Medications:  Continue current management. Patient is not suicidal. She can be discharged home, once medically stable.  Resources were provided in community

## 2021-06-27 VITALS
WEIGHT: 137 LBS | TEMPERATURE: 98.1 F | HEART RATE: 101 BPM | BODY MASS INDEX: 22.82 KG/M2 | DIASTOLIC BLOOD PRESSURE: 87 MMHG | RESPIRATION RATE: 18 BRPM | SYSTOLIC BLOOD PRESSURE: 151 MMHG | OXYGEN SATURATION: 92 % | HEIGHT: 65 IN

## 2021-06-27 LAB
ANION GAP SERPL CALCULATED.3IONS-SCNC: 7 MEQ/L (ref 8–16)
BASOPHILS # BLD: 0.7 %
BASOPHILS ABSOLUTE: 0.1 THOU/MM3 (ref 0–0.1)
BUN BLDV-MCNC: 6 MG/DL (ref 7–22)
CALCIUM SERPL-MCNC: 8.9 MG/DL (ref 8.5–10.5)
CHLORIDE BLD-SCNC: 108 MEQ/L (ref 98–111)
CO2: 24 MEQ/L (ref 23–33)
CREAT SERPL-MCNC: 0.6 MG/DL (ref 0.4–1.2)
EOSINOPHIL # BLD: 2.7 %
EOSINOPHILS ABSOLUTE: 0.2 THOU/MM3 (ref 0–0.4)
ERYTHROCYTE [DISTWIDTH] IN BLOOD BY AUTOMATED COUNT: 13.1 % (ref 11.5–14.5)
ERYTHROCYTE [DISTWIDTH] IN BLOOD BY AUTOMATED COUNT: 44.2 FL (ref 35–45)
GFR SERPL CREATININE-BSD FRML MDRD: > 90 ML/MIN/1.73M2
GLUCOSE BLD-MCNC: 110 MG/DL (ref 70–108)
GLUCOSE BLD-MCNC: 118 MG/DL (ref 70–108)
GLUCOSE BLD-MCNC: 128 MG/DL (ref 70–108)
HCT VFR BLD CALC: 40.8 % (ref 37–47)
HEMOGLOBIN: 12.8 GM/DL (ref 12–16)
IMMATURE GRANS (ABS): 0.04 THOU/MM3 (ref 0–0.07)
IMMATURE GRANULOCYTES: 0.5 %
LYMPHOCYTES # BLD: 22.2 %
LYMPHOCYTES ABSOLUTE: 1.6 THOU/MM3 (ref 1–4.8)
MCH RBC QN AUTO: 28.6 PG (ref 26–33)
MCHC RBC AUTO-ENTMCNC: 31.4 GM/DL (ref 32.2–35.5)
MCV RBC AUTO: 91.1 FL (ref 81–99)
MONOCYTES # BLD: 6.4 %
MONOCYTES ABSOLUTE: 0.5 THOU/MM3 (ref 0.4–1.3)
NUCLEATED RED BLOOD CELLS: 0 /100 WBC
PLATELET # BLD: 230 THOU/MM3 (ref 130–400)
PMV BLD AUTO: 10.4 FL (ref 9.4–12.4)
POTASSIUM REFLEX MAGNESIUM: 4.1 MEQ/L (ref 3.5–5.2)
RBC # BLD: 4.48 MILL/MM3 (ref 4.2–5.4)
SEG NEUTROPHILS: 67.5 %
SEGMENTED NEUTROPHILS ABSOLUTE COUNT: 4.9 THOU/MM3 (ref 1.8–7.7)
SODIUM BLD-SCNC: 139 MEQ/L (ref 135–145)
WBC # BLD: 7.3 THOU/MM3 (ref 4.8–10.8)

## 2021-06-27 PROCEDURE — 2580000003 HC RX 258: Performed by: PHYSICIAN ASSISTANT

## 2021-06-27 PROCEDURE — 96366 THER/PROPH/DIAG IV INF ADDON: CPT

## 2021-06-27 PROCEDURE — 6370000000 HC RX 637 (ALT 250 FOR IP): Performed by: STUDENT IN AN ORGANIZED HEALTH CARE EDUCATION/TRAINING PROGRAM

## 2021-06-27 PROCEDURE — 6360000002 HC RX W HCPCS: Performed by: STUDENT IN AN ORGANIZED HEALTH CARE EDUCATION/TRAINING PROGRAM

## 2021-06-27 PROCEDURE — 99217 PR OBSERVATION CARE DISCHARGE MANAGEMENT: CPT | Performed by: PHYSICIAN ASSISTANT

## 2021-06-27 PROCEDURE — 2580000003 HC RX 258: Performed by: STUDENT IN AN ORGANIZED HEALTH CARE EDUCATION/TRAINING PROGRAM

## 2021-06-27 PROCEDURE — 96372 THER/PROPH/DIAG INJ SC/IM: CPT

## 2021-06-27 PROCEDURE — 6370000000 HC RX 637 (ALT 250 FOR IP): Performed by: PHYSICIAN ASSISTANT

## 2021-06-27 PROCEDURE — 85025 COMPLETE CBC W/AUTO DIFF WBC: CPT

## 2021-06-27 PROCEDURE — 80048 BASIC METABOLIC PNL TOTAL CA: CPT

## 2021-06-27 PROCEDURE — G0378 HOSPITAL OBSERVATION PER HR: HCPCS

## 2021-06-27 PROCEDURE — 36415 COLL VENOUS BLD VENIPUNCTURE: CPT

## 2021-06-27 PROCEDURE — 82948 REAGENT STRIP/BLOOD GLUCOSE: CPT

## 2021-06-27 RX ORDER — MULTIVITAMIN WITH IRON
1 TABLET ORAL DAILY
Qty: 30 TABLET | Refills: 0 | Status: SHIPPED | OUTPATIENT
Start: 2021-06-28 | End: 2021-07-28

## 2021-06-27 RX ORDER — THIAMINE MONONITRATE (VIT B1) 100 MG
100 TABLET ORAL DAILY
Qty: 30 TABLET | Refills: 3 | Status: SHIPPED | OUTPATIENT
Start: 2021-06-27

## 2021-06-27 RX ORDER — FOLIC ACID 1 MG/1
1 TABLET ORAL DAILY
Qty: 30 TABLET | Refills: 0 | Status: SHIPPED | OUTPATIENT
Start: 2021-06-27

## 2021-06-27 RX ORDER — AMLODIPINE BESYLATE 5 MG/1
5 TABLET ORAL DAILY
Qty: 30 TABLET | Refills: 3 | Status: SHIPPED | OUTPATIENT
Start: 2021-06-28

## 2021-06-27 RX ADMIN — LEVOTHYROXINE SODIUM 50 MCG: 0.05 TABLET ORAL at 06:09

## 2021-06-27 RX ADMIN — Medication 1 TABLET: at 07:34

## 2021-06-27 RX ADMIN — THIAMINE HYDROCHLORIDE 100 MG: 100 INJECTION, SOLUTION INTRAMUSCULAR; INTRAVENOUS at 07:40

## 2021-06-27 RX ADMIN — LISINOPRIL 5 MG: 5 TABLET ORAL at 07:33

## 2021-06-27 RX ADMIN — SODIUM CHLORIDE: 9 INJECTION, SOLUTION INTRAVENOUS at 10:19

## 2021-06-27 RX ADMIN — LORAZEPAM 1 MG: 1 TABLET ORAL at 13:09

## 2021-06-27 RX ADMIN — FLUTICASONE PROPIONATE 2 SPRAY: 50 SPRAY, METERED NASAL at 07:34

## 2021-06-27 RX ADMIN — AMLODIPINE BESYLATE 5 MG: 5 TABLET ORAL at 07:33

## 2021-06-27 RX ADMIN — ENOXAPARIN SODIUM 40 MG: 40 INJECTION SUBCUTANEOUS at 07:40

## 2021-06-27 RX ADMIN — CETIRIZINE HYDROCHLORIDE 10 MG: 10 TABLET, FILM COATED ORAL at 07:34

## 2021-06-27 RX ADMIN — ACETAMINOPHEN 650 MG: 325 TABLET ORAL at 07:40

## 2021-06-27 RX ADMIN — VENLAFAXINE HYDROCHLORIDE 150 MG: 150 CAPSULE, EXTENDED RELEASE ORAL at 07:34

## 2021-06-27 RX ADMIN — PANTOPRAZOLE SODIUM 40 MG: 40 TABLET, DELAYED RELEASE ORAL at 06:09

## 2021-06-27 ASSESSMENT — PAIN DESCRIPTION - DESCRIPTORS: DESCRIPTORS: ACHING

## 2021-06-27 ASSESSMENT — PAIN SCALES - GENERAL
PAINLEVEL_OUTOF10: 0
PAINLEVEL_OUTOF10: 0
PAINLEVEL_OUTOF10: 5

## 2021-06-27 ASSESSMENT — PAIN - FUNCTIONAL ASSESSMENT: PAIN_FUNCTIONAL_ASSESSMENT: ACTIVITIES ARE NOT PREVENTED

## 2021-06-27 ASSESSMENT — PAIN DESCRIPTION - FREQUENCY: FREQUENCY: INTERMITTENT

## 2021-06-27 ASSESSMENT — PAIN DESCRIPTION - PROGRESSION: CLINICAL_PROGRESSION: NOT CHANGED

## 2021-06-27 ASSESSMENT — PAIN DESCRIPTION - LOCATION: LOCATION: HEAD

## 2021-06-27 ASSESSMENT — PAIN DESCRIPTION - PAIN TYPE: TYPE: ACUTE PAIN

## 2021-06-27 ASSESSMENT — PAIN DESCRIPTION - ORIENTATION: ORIENTATION: LEFT

## 2021-06-27 ASSESSMENT — PAIN DESCRIPTION - ONSET: ONSET: PROGRESSIVE

## 2021-06-27 NOTE — PLAN OF CARE
Problem: DISCHARGE BARRIERS  Goal: Patient's continuum of care needs are met  Outcome: Ongoing     Problem: KNOWLEDGE DEFICIT  Goal: Patient/S.O. demonstrates understanding of disease process, treatment plan, medications, and discharge instructions.   Outcome: Ongoing     Problem: SKIN INTEGRITY  Goal: Skin integrity is maintained or improved  Outcome: Ongoing     Problem: DAILY CARE  Goal: Daily care needs are met  Outcome: Ongoing     Problem: SAFETY  Goal: Free from accidental physical injury  Outcome: Ongoing

## 2021-06-27 NOTE — PLAN OF CARE
Problem: Falls - Risk of:  Goal: Will remain free from falls  Description: Will remain free from falls  6/27/2021 1144 by Shanw Moran RN  Outcome: Ongoing  6/27/2021 0647 by Brianna Carroll RN  Outcome: Ongoing  Goal: Absence of physical injury  Description: Absence of physical injury  6/27/2021 1144 by Shawn Moran RN  Outcome: Ongoing  6/27/2021 0647 by Brianna Carroll RN  Outcome: Ongoing     Problem: Safety:  Goal: Ability to remain free from injury will improve  Description: Ability to remain free from injury will improve  6/27/2021 1144 by Shawn Moran RN  Outcome: Ongoing  6/27/2021 0647 by Brianna Carroll RN  Outcome: Ongoing     Problem: Pain:  Goal: Pain level will decrease  Description: Pain level will decrease  Outcome: Ongoing  Goal: Control of acute pain  Description: Control of acute pain  Outcome: Ongoing  Goal: Control of chronic pain  Description: Control of chronic pain  Outcome: Ongoing

## 2021-06-27 NOTE — PLAN OF CARE
Problem: Falls - Risk of:  Goal: Will remain free from falls  Description: Will remain free from falls  6/27/2021 0647 by Arnav Diaz RN  Outcome: Ongoing  6/26/2021 1719 by Aayush Moss RN  Outcome: Ongoing  Goal: Absence of physical injury  Description: Absence of physical injury  6/27/2021 0647 by Arnav Diaz RN  Outcome: Ongoing  6/26/2021 1719 by Aayush Moss RN  Outcome: Ongoing     Problem: Safety:  Goal: Ability to remain free from injury will improve  Description: Ability to remain free from injury will improve  6/27/2021 0647 by Arnav Diaz RN  Outcome: Ongoing  6/26/2021 1719 by Aayush Moss RN  Outcome: Ongoing

## 2021-06-27 NOTE — PLAN OF CARE
Problem: Tissue Perfusion:  Goal: Adequacy of tissue perfusion will improve  Description: Adequacy of tissue perfusion will improve  Outcome: Ongoing     Problem: Skin Integrity:  Goal: Risk for impaired skin integrity will decrease  Description: Risk for impaired skin integrity will decrease  Outcome: Ongoing     Problem: Physical Regulation:  Goal: Complications related to the disease process, condition or treatment will be avoided or minimized  Description: Complications related to the disease process, condition or treatment will be avoided or minimized  Outcome: Ongoing  Goal: Diagnostic test results will improve  Description: Diagnostic test results will improve  Outcome: Ongoing     Problem: Nutritional:  Goal: Maintenance of adequate nutrition will improve  Description: Maintenance of adequate nutrition will improve  Outcome: Ongoing  Goal: Progress toward achieving an optimal weight will improve  Description: Progress toward achieving an optimal weight will improve  Outcome: Ongoing     Problem: Metabolic:  Goal: Ability to maintain appropriate glucose levels will improve  Description: Ability to maintain appropriate glucose levels will improve  Outcome: Ongoing     Problem: Health Behavior:  Goal: Ability to identify and utilize available resources and services will improve  Description: Ability to identify and utilize available resources and services will improve  Outcome: Ongoing  Goal: Ability to manage health-related needs will improve  Description: Ability to manage health-related needs will improve  Outcome: Ongoing

## 2021-06-28 LAB
ORGANISM: ABNORMAL
ORGANISM: ABNORMAL
URINE CULTURE REFLEX: ABNORMAL
URINE CULTURE REFLEX: ABNORMAL

## 2021-06-28 NOTE — DISCHARGE SUMMARY
Hospitalist Discharge Summary        Patient: John Barboza  YOB: 1955  MRN: [id number]   Acct: [de-identified]    Primary Care Physician: Chica Singh date  6/25/2021    Discharge date:  6/27/2021 5:00 PM    Chief Complaint on presentation :-  SOB    Discharge Assessment and Plan:-   1. Concern for benzodiazepine withdrawal: reportedly ran out of her 90 day supply within 10 days. Prescription filled 6/1/21 from [facility] doctor. Pt was initially lethargic on admission but has since improved. She does continue to be very fidgety with pressured speech and continually lip smacking. Intermittent tachycardia. Appreciate psychiatry input -> pt stable for discharge from their standpoint. 2. Metabolic encephalopathy, improved: alert and oriented x 3, some haziness in details. 3. Asymptomatic bacteruria: pt A&Ox4, denies any urinary symptoms including dysuria, increased urgency/frequency. UA shows moderate leukocytes. No bacteria noted on microscopic. Klebsiella / streptococcus anginosus growing on urine cx, no treatment given lack of symptoms. 4. COPD without acute exacerbation: Patient with appropriate saturations on room air. No respiratory distress noted. No home inhalers listed. Continue to monitor respiratory status. 5. NIDDM II, controlled: On Metformin at home, hold. Utilize SSI. Accu. Carb control hypoglycemia protocol in place. 6. Acquired hypothyroidism: Continue daily home Synthroid  7. GERD: PPI  8. Substance abuse: UDS + for PCP. Continue with multivitamin, thiamine. 9. Essential HTN: BP appears slightly elevated, resume home Lisinopril. Will add Norvasc 5 mg daily and assess response    10. Anxiety: prescribed Ativan TID 1 mg by [facility] doctor. On Effexor. Psych to evaluate.        Initial H and P and Hospital course:-  \"This is a 72-year-old female with PMH of chronic benzodiazepine dependence with history of withdrawal with seizures, possible PCP use per UDS, COPD(not on home oxygen, uses inhalers), GERD, HTN who presented to the ED withdrawing from benzos.  Apparently, patient gets a prescription from [facility] of 1 mg Ativan 90 count/month.  Review of MAR indicates that patient took all 90 or pills within 10 days or so, says that now she ran out.  In the ED, patient is notably altered, first appeared snowed, followed by altered behavior exhibited with lipsmacking, tremors, confusion, talking to people in the room who were not there.  Patient endorsed shortness of breath, numbness in her feet, lightheadedness.  Patient does have some unclear psychiatric disorder, will get psychiatry on board to establish intent. [address] team were contacted for admission.  Patient admitted to observation.     CT head normal.\"    6/26-> patient doing okay, sitting up in bed. She is very fidgety and has pressured speech. When asked about PCP in her urine patient got very defensive and her demeanor changed and she said \"this is bull\" and requested a repeat UDS. Pt denies CP/SOB. She does admit to \"sometimes abusing\" her Ativan. She notes that she [facility] takes 0.5 mg\", however her home dose is 1 mg TID. Psych to evaluate.      Pt admitted for concern for benzodiazepine withdrawal. Pt was observed overnight. Psychiatry was consulted. Pt was medically stable and okay for discharge. Noted to have + urine culture, however lack of symptoms and therefore not treated. Pt was A&Ox4 at time of discharge and stable for home with follow up for PCP. Physical Exam:-  Vitals:   No data found. Weight:   Weight: 137 lb (62.1 kg)   24 hour intake/output:   No intake or output data in the 24 hours ending 06/28/21 1700    General appearance: fidgety, anxious, unkempt, no apparent distress, appears stated age and cooperative. HEENT: Pupils equal, round, and reactive to light. Conjunctivae/corneas clear. Neck: Supple, with full range of motion. No jugular venous distention.  Trachea venlafaxine 150 MG extended release capsule  Commonly known as: EFFEXOR XR     zolpidem 10 MG tablet  Commonly known as: AMBIEN           Where to Get Your Medications      These medications were [address]  [address] [address]    Phone: [phone]   · amLODIPine 5 MG tablet  · folic acid 1 MG tablet  · multivitamin Tabs tablet  · vitamin B-1 100 MG tablet          Labs :-  Recent Results (from the past 72 hour(s))   APTT    Collection Time: 06/25/21 10:32 PM   Result Value Ref Range    aPTT 29.5 22.0 - 38.0 seconds   Basic Metabolic Panel    Collection Time: 06/25/21 10:32 PM   Result Value Ref Range    Sodium 142 135 - 145 meq/L    Potassium 3.5 3.5 - 5.2 meq/L    Chloride 102 98 - 111 meq/L    CO2 25 23 - 33 meq/L    Glucose 178 (H) 70 - 108 mg/dL    BUN 11 7 - 22 mg/dL    CREATININE 0.7 0.4 - 1.2 mg/dL    Calcium 10.0 8.5 - 10.5 mg/dL   Brain Natriuretic Peptide    Collection Time: 06/25/21 10:32 PM   Result Value Ref Range    Pro-BNP 87.8 0.0 - 900.0 pg/mL   CBC Auto Differential    Collection Time: 06/25/21 10:32 PM   Result Value Ref Range    WBC 11.9 (H) 4.8 - 10.8 thou/mm3    RBC 4.74 4.20 - 5.40 mill/mm3    Hemoglobin 13.3 12.0 - 16.0 gm/dl    Hematocrit 42.4 37.0 - 47.0 %    MCV 89.5 81.0 - 99.0 fL    MCH 28.1 26.0 - 33.0 pg    MCHC 31.4 (L) 32.2 - 35.5 gm/dl    RDW-CV 12.9 11.5 - 14.5 %    RDW-SD 42.4 35.0 - 45.0 fL    Platelets 034 083 - 286 thou/mm3    MPV 10.0 9.4 - 12.4 fL    Seg Neutrophils 81.9 %    Lymphocytes 11.7 %    Monocytes 5.1 %    Eosinophils 0.5 %    Basophils 0.4 %    Immature Granulocytes 0.4 %    Segs Absolute 9.7 (H) 1 - 7 thou/mm3    Lymphocytes Absolute 1.4 1.0 - 4.8 thou/mm3    Monocytes Absolute 0.6 0.4 - 1.3 thou/mm3    Eosinophils Absolute 0.1 0.0 - 0.4 thou/mm3    Basophils Absolute 0.0 0.0 - 0.1 thou/mm3    Immature Grans (Abs) 0.05 0.00 - 0.07 thou/mm3    nRBC 0 /100 wbc   Hepatic Function Panel    Collection Time: 06/25/21 10:32 PM   Result Value Ref Range    Albumin 4.5 3.5 - 5.1 g/dL    Total Bilirubin 0.2 (L) 0.3 - 1.2 mg/dL    Bilirubin, Direct <0.2 0.0 - 0.3 mg/dL    Alkaline Phosphatase 91 38 - 126 U/L    AST 17 5 - 40 U/L    ALT 19 11 - 66 U/L    Total Protein 7.4 6.1 - 8.0 g/dL   Lipase    Collection Time: 06/25/21 10:32 PM   Result Value Ref Range    Lipase 25.7 5.6 - 51.3 U/L   Protime-INR    Collection Time: 06/25/21 10:32 PM   Result Value Ref Range    INR 0.97 0.85 - 1.13   Magnesium    Collection Time: 06/25/21 10:32 PM   Result Value Ref Range    Magnesium 1.6 1.6 - 2.4 mg/dL   Troponin    Collection Time: 06/25/21 10:32 PM   Result Value Ref Range    Troponin T < 0.010 ng/ml   TSH with Reflex    Collection Time: 06/25/21 10:32 PM   Result Value Ref Range    TSH 3.460 0.400 - 4.200 uIU/mL   Anion Gap    Collection Time: 06/25/21 10:32 PM   Result Value Ref Range    Anion Gap 15.0 8.0 - 16.0 meq/L   Glomerular Filtration Rate, Estimated    Collection Time: 06/25/21 10:32 PM   Result Value Ref Range    Est, Glom Filt Rate 84 (A) ml/min/1.73m2   Osmolality    Collection Time: 06/25/21 10:32 PM   Result Value Ref Range    Osmolality Calc 286.9 275.0 - 300.0 mOsmol/kg   EKG 12 Lead    Collection Time: 06/25/21 11:17 PM   Result Value Ref Range    Ventricular Rate 107 BPM    Atrial Rate 107 BPM    P-R Interval 152 ms    QRS Duration 106 ms    Q-T Interval 348 ms    QTc Calculation (Bazett) 464 ms    P Axis 42 degrees    R Axis 10 degrees    T Axis 25 degrees   Urine Drug Screen    Collection Time: 06/26/21  1:26 AM   Result Value Ref Range    AMPHETAMINE+METHAMPHETAMINE URINE SCREEN Negative NEGATIVE    Barbiturate Quant, Ur Negative NEGATIVE    Benzodiazepine Quant, Ur Negative NEGATIVE    Cannabinoid Quant, Ur Negative NEGATIVE    Cocaine Metab Quant, Ur Negative NEGATIVE    Opiates, Urine Negative NEGATIVE    Oxycodone Negative NEGATIVE PCP Quant, Ur POSITIVE NEGATIVE   Urine with Reflexed Micro    Collection Time: 06/26/21  1:26 AM   Result Value Ref Range    Glucose, Ur NEGATIVE NEGATIVE mg/dl    Bilirubin Urine NEGATIVE NEGATIVE    Ketones, Urine NEGATIVE NEGATIVE    Specific Gravity, Urine 1.018 1.002 - 1.030    Blood, Urine NEGATIVE NEGATIVE    pH, UA 7.5 5.0 - 9.0    Protein, UA NEGATIVE NEGATIVE    Urobilinogen, Urine 1.0 0.0 - 1.0 eu/dl    Nitrite, Urine NEGATIVE NEGATIVE    Leukocyte Esterase, Urine MODERATE (A) NEGATIVE    Color, UA YELLOW STRAW-YELLOW    Character, Urine CLEAR CLEAR-SL CLOUD    RBC, UA 0-2 0-2/hpf /hpf    WBC, UA 15-25 0-4/hpf /hpf    Epithelial Cells, UA NONE SEEN 3-5/hpf /hpf    Mucus, UA THREADS NONE SEEN/THREA    Bacteria, UA NONE SEEN FEW/NONE SEEN /hpf    Casts UA NONE SEEN NONE SEEN /lpf    Crystals, UA NONE SEEN NONE SEEN    Renal Epithelial, UA NONE SEEN NONE SEEN    Yeast, UA NONE SEEN NONE SEEN    CASTS 2 NONE SEEN NONE SEEN /lpf    MISCELLANEOUS 2 NONE SEEN    Culture, Reflexed, Urine    Collection Time: 06/26/21  1:26 AM    Specimen: Urine, clean catch   Result Value Ref Range    Organism Klebsiella pneumoniae (A)     Urine Culture Reflex Gilman count: >100,000 CFU/mL      Organism Streptococcus anginosus (A)     Urine Culture Reflex       Gilman count: 10,000-50,000 CFU/mL S. anginosus group are largely susceptible to beta-lactam agents; ceftriaxone is the preferred agent.  If allergy or resistance precludes use of beta-lactam agents, vancomycin is an appropriate alternative agent        Susceptibility    Klebsiella pneumoniae - BACTERIAL SUSCEPTIBILITY PANEL BY CATHY     amoxicillin-clavulanate <=2 Sensitive mcg/mL     cefOXitin <=4 Sensitive mcg/mL     cefTRIAXone <=1 Sensitive mcg/mL     ampicillin 16 Resistant mcg/mL     gentamicin <=1 Sensitive mcg/mL     trimethoprim-sulfamethoxazole <=20 Sensitive mcg/mL     ciprofloxacin <=0.25 Sensitive mcg/mL     tetracycline <=1 Sensitive mcg/mL     nitrofurantoin thou/mm3    Eosinophils Absolute 0.2 0.0 - 0.4 thou/mm3    Basophils Absolute 0.1 0.0 - 0.1 thou/mm3    Immature Grans (Abs) 0.04 0.00 - 0.07 thou/mm3    nRBC 0 /100 wbc   Anion Gap    Collection Time: 06/27/21  3:47 AM   Result Value Ref Range    Anion Gap 7.0 (L) 8.0 - 16.0 meq/L   Glomerular Filtration Rate, Estimated    Collection Time: 06/27/21  3:47 AM   Result Value Ref Range    Est, Glom Filt Rate >90 ml/min/1.73m2   POCT glucose    Collection Time: 06/27/21  7:52 AM   Result Value Ref Range    POC Glucose 118 (H) 70 - 108 mg/dl   POCT glucose    Collection Time: 06/27/21 12:00 PM   Result Value Ref Range    POC Glucose 110 (H) 70 - 108 mg/dl        Microbiology:    Blood culture #1:   Lab Results   Component Value Date    BC No growth-preliminary  No growth   08/24/2016    BC No growth-preliminary  No growth   08/24/2016       Blood culture #2:No results found for: Rigoberto Ramires    Organism:  No results found for: LABGRAM    MRSA culture only:No results found for: [facility]    Urine culture:   Lab Results   Component Value Date    LABURIN  08/22/2016     Growth of Contaminants. The mixture of organisms present  are not a common cause of urinary tract infections and  probably represent skin sumeet or distal urethral sumeet.        Lab Results   Component Value Date    ORG Klebsiella pneumoniae 06/26/2021    ORG Streptococcus anginosus 06/26/2021        Respiratory culture: No results found for: CULTRESP    Aerobic and Anaerobic :  No results found for: LABAERO  No results found for: LABANAE    Urinalysis:      Lab Results   Component Value Date    NITRU NEGATIVE 06/26/2021    WBCUA 15-25 06/26/2021    BACTERIA NONE SEEN 06/26/2021    RBCUA 0-2 06/26/2021    BLOODU NEGATIVE 06/26/2021    SPECGRAV <1.005 06/13/2016    GLUCOSEU NEGATIVE 06/26/2021       Radiology:-  CT HEAD WO CONTRAST    Result Date: 6/26/2021  CT head without: Comparison:  CT,SR  - CT HEAD WO CONTRAST  - 11/16/2020 12:35 AM EST Findings: Age-appropriate sulcation. No intracranial mass, midline shift, hydrocephalus, acute hemorrhage or infarct. Unremarkable orbits. Visualized paranasal sinuses are clear. Mastoid air cells are clear. No skull fracture. No significant scalp soft tissue swelling. Impression: 1. No acute intracranial abnormality. Age-appropriate exam. 2.  Stable intracranial exam. This document has been electronically signed by: Jessica Tellez MD on 06/26/2021 12:53 AM All CTs at this facility use dose modulation techniques and iterative reconstructions, and/or weight-based dosing when appropriate to reduce radiation to a low as reasonably achievable. XR CHEST PORTABLE    Result Date: 6/25/2021  Chest X-ray, 1 View COMPARISON:  CR,SR  - XR CHEST STANDARD (2 VW)  - 05/12/2018 07:34 PM EDT FINDINGS: No consolidation. Mild atelectasis at the left lung base. No pleural effusion. No pneumothorax. No cardiomegaly. No acute fracture. No acute findings.  This document has been electronically signed by: Yinka Cruz MD on 06/25/2021 10:46 PM       Follow-up scheduled after discharge :-    in the next few days with Lam Falling    Consultations during this hospital stay:-  [] NONE [] Cardiology  [] Nephrology  [] Hemo onco   [] GI   [] ID  [] Endocrine  [] Pulm    [] Neuro    [x] Psych   [] Urology  [] ENT   [] G SURGERY   []Ortho    []CV surg    [] Palliative  [] Hospice [] Pain management   []    []TCU   [] PT/OT  OTHERS:-    Disposition: home  Condition at Discharge: Stable    Time Spent:- 35 minutes    Electronically signed by Vijay Hernandez PA-C on 6/28/21 at 5:00 PM EDT   Discharging Hospitalist

## 2022-01-25 ENCOUNTER — HOSPITAL ENCOUNTER (OUTPATIENT)
Dept: MAMMOGRAPHY | Age: 67
Discharge: HOME | End: 2022-01-25
Payer: MEDICARE

## 2022-01-25 DIAGNOSIS — Z12.31 VISIT FOR SCREENING MAMMOGRAM: ICD-10-CM

## 2022-01-25 PROCEDURE — 77063 BREAST TOMOSYNTHESIS BI: CPT

## 2022-02-08 ENCOUNTER — HOSPITAL ENCOUNTER (OUTPATIENT)
Dept: NUCLEAR MEDICINE | Age: 67
Discharge: HOME | End: 2022-02-08
Payer: MEDICARE

## 2022-02-08 DIAGNOSIS — K31.89 RETAINED FOOD IN STOMACH: ICD-10-CM

## 2022-02-08 PROCEDURE — A9541 TC99M SULFUR COLLOID: HCPCS | Performed by: INTERNAL MEDICINE

## 2022-02-08 PROCEDURE — 3430000000 HC RX DIAGNOSTIC RADIOPHARMACEUTICAL: Performed by: INTERNAL MEDICINE

## 2022-02-08 PROCEDURE — 78264 GASTRIC EMPTYING IMG STUDY: CPT

## 2022-02-08 RX ADMIN — Medication 1 MILLICURIE: at 08:24

## 2022-07-25 ENCOUNTER — APPOINTMENT (OUTPATIENT)
Dept: GENERAL RADIOLOGY | Age: 67
End: 2022-07-25
Payer: MEDICARE

## 2022-07-25 ENCOUNTER — HOSPITAL ENCOUNTER (EMERGENCY)
Age: 67
Discharge: HOME | End: 2022-07-26
Attending: EMERGENCY MEDICINE
Payer: MEDICARE

## 2022-07-25 DIAGNOSIS — J18.9 PNEUMONIA OF BOTH LOWER LOBES DUE TO INFECTIOUS ORGANISM: Primary | ICD-10-CM

## 2022-07-25 LAB
FLU A ANTIGEN: NEGATIVE
FLU B ANTIGEN: NEGATIVE
SARS-COV-2, NAAT: NOT  DETECTED

## 2022-07-25 PROCEDURE — 87635 SARS-COV-2 COVID-19 AMP PRB: CPT

## 2022-07-25 PROCEDURE — 87804 INFLUENZA ASSAY W/OPTIC: CPT

## 2022-07-25 PROCEDURE — 71046 X-RAY EXAM CHEST 2 VIEWS: CPT

## 2022-07-25 PROCEDURE — 99284 EMERGENCY DEPT VISIT MOD MDM: CPT

## 2022-07-25 RX ORDER — GUAIFENESIN/DEXTROMETHORPHAN 100-10MG/5
5 SYRUP ORAL ONCE
Status: COMPLETED | OUTPATIENT
Start: 2022-07-26 | End: 2022-07-26

## 2022-07-25 ASSESSMENT — ENCOUNTER SYMPTOMS
SORE THROAT: 0
RHINORRHEA: 0
COUGH: 1
ABDOMINAL DISTENTION: 0
CHOKING: 0
ABDOMINAL PAIN: 0
EYE ITCHING: 0
CONSTIPATION: 0
EYE REDNESS: 0
TROUBLE SWALLOWING: 0
VOICE CHANGE: 0
BACK PAIN: 0
DIARRHEA: 0
SINUS PRESSURE: 0
NAUSEA: 0
BLOOD IN STOOL: 0
PHOTOPHOBIA: 0
SHORTNESS OF BREATH: 0
CHEST TIGHTNESS: 0
EYE PAIN: 0
WHEEZING: 0
VOMITING: 0
EYE DISCHARGE: 0

## 2022-07-25 ASSESSMENT — PAIN DESCRIPTION - LOCATION: LOCATION: GENERALIZED

## 2022-07-25 ASSESSMENT — PAIN SCALES - GENERAL: PAINLEVEL_OUTOF10: 2

## 2022-07-25 ASSESSMENT — PAIN - FUNCTIONAL ASSESSMENT: PAIN_FUNCTIONAL_ASSESSMENT: 0-10

## 2022-07-25 ASSESSMENT — PAIN DESCRIPTION - DESCRIPTORS: DESCRIPTORS: ACHING

## 2022-07-26 VITALS
WEIGHT: 139 LBS | DIASTOLIC BLOOD PRESSURE: 73 MMHG | BODY MASS INDEX: 23.16 KG/M2 | RESPIRATION RATE: 16 BRPM | TEMPERATURE: 98.1 F | OXYGEN SATURATION: 96 % | HEIGHT: 65 IN | HEART RATE: 109 BPM | SYSTOLIC BLOOD PRESSURE: 132 MMHG

## 2022-07-26 LAB
ALBUMIN SERPL-MCNC: 4 G/DL (ref 3.5–5.1)
ALP BLD-CCNC: 87 U/L (ref 38–126)
ALT SERPL-CCNC: 24 U/L (ref 11–66)
ANION GAP SERPL CALCULATED.3IONS-SCNC: 16 MEQ/L (ref 8–16)
AST SERPL-CCNC: 20 U/L (ref 5–40)
BASOPHILS # BLD: 0.8 %
BASOPHILS ABSOLUTE: 0.1 THOU/MM3 (ref 0–0.1)
BILIRUB SERPL-MCNC: < 0.2 MG/DL (ref 0.3–1.2)
BUN BLDV-MCNC: 15 MG/DL (ref 7–22)
CALCIUM SERPL-MCNC: 9 MG/DL (ref 8.5–10.5)
CHLORIDE BLD-SCNC: 101 MEQ/L (ref 98–111)
CO2: 22 MEQ/L (ref 23–33)
CREAT SERPL-MCNC: 0.7 MG/DL (ref 0.4–1.2)
EOSINOPHIL # BLD: 3.5 %
EOSINOPHILS ABSOLUTE: 0.2 THOU/MM3 (ref 0–0.4)
ERYTHROCYTE [DISTWIDTH] IN BLOOD BY AUTOMATED COUNT: 12.9 % (ref 11.5–14.5)
ERYTHROCYTE [DISTWIDTH] IN BLOOD BY AUTOMATED COUNT: 41.7 FL (ref 35–45)
GFR SERPL CREATININE-BSD FRML MDRD: 84 ML/MIN/1.73M2
GLUCOSE BLD-MCNC: 198 MG/DL (ref 70–108)
HCT VFR BLD CALC: 38 % (ref 37–47)
HEMOGLOBIN: 12.2 GM/DL (ref 12–16)
IMMATURE GRANS (ABS): 0.05 THOU/MM3 (ref 0–0.07)
IMMATURE GRANULOCYTES: 0.8 %
LYMPHOCYTES # BLD: 32 %
LYMPHOCYTES ABSOLUTE: 2 THOU/MM3 (ref 1–4.8)
MAGNESIUM: 1.5 MG/DL (ref 1.6–2.4)
MCH RBC QN AUTO: 28.4 PG (ref 26–33)
MCHC RBC AUTO-ENTMCNC: 32.1 GM/DL (ref 32.2–35.5)
MCV RBC AUTO: 88.4 FL (ref 81–99)
MONOCYTES # BLD: 8 %
MONOCYTES ABSOLUTE: 0.5 THOU/MM3 (ref 0.4–1.3)
NUCLEATED RED BLOOD CELLS: 0 /100 WBC
OSMOLALITY CALCULATION: 283.9 MOSMOL/KG (ref 275–300)
PLATELET # BLD: 220 THOU/MM3 (ref 130–400)
PMV BLD AUTO: 9.6 FL (ref 9.4–12.4)
POTASSIUM SERPL-SCNC: 3.7 MEQ/L (ref 3.5–5.2)
PRO-BNP: 21.9 PG/ML (ref 0–900)
RBC # BLD: 4.3 MILL/MM3 (ref 4.2–5.4)
SEG NEUTROPHILS: 54.9 %
SEGMENTED NEUTROPHILS ABSOLUTE COUNT: 3.5 THOU/MM3 (ref 1.8–7.7)
SODIUM BLD-SCNC: 139 MEQ/L (ref 135–145)
TOTAL PROTEIN: 6.7 G/DL (ref 6.1–8)
TROPONIN T: < 0.01 NG/ML
WBC # BLD: 6.3 THOU/MM3 (ref 4.8–10.8)

## 2022-07-26 PROCEDURE — 36415 COLL VENOUS BLD VENIPUNCTURE: CPT

## 2022-07-26 PROCEDURE — 83880 ASSAY OF NATRIURETIC PEPTIDE: CPT

## 2022-07-26 PROCEDURE — 84484 ASSAY OF TROPONIN QUANT: CPT

## 2022-07-26 PROCEDURE — 83735 ASSAY OF MAGNESIUM: CPT

## 2022-07-26 PROCEDURE — 80053 COMPREHEN METABOLIC PANEL: CPT

## 2022-07-26 PROCEDURE — 6370000000 HC RX 637 (ALT 250 FOR IP): Performed by: EMERGENCY MEDICINE

## 2022-07-26 PROCEDURE — 85025 COMPLETE CBC W/AUTO DIFF WBC: CPT

## 2022-07-26 RX ORDER — GUAIFENESIN/DEXTROMETHORPHAN 100-10MG/5
5 SYRUP ORAL 3 TIMES DAILY PRN
Qty: 120 ML | Refills: 0 | Status: SHIPPED | OUTPATIENT
Start: 2022-07-26 | End: 2022-08-05

## 2022-07-26 RX ORDER — LEVOFLOXACIN 500 MG/1
500 TABLET, FILM COATED ORAL ONCE
Status: COMPLETED | OUTPATIENT
Start: 2022-07-26 | End: 2022-07-26

## 2022-07-26 RX ORDER — LEVOFLOXACIN 500 MG/1
500 TABLET, FILM COATED ORAL DAILY
Qty: 10 TABLET | Refills: 0 | Status: SHIPPED | OUTPATIENT
Start: 2022-07-26 | End: 2022-08-05

## 2022-07-26 RX ADMIN — LEVOFLOXACIN 500 MG: 500 TABLET, FILM COATED ORAL at 02:11

## 2022-07-26 RX ADMIN — GUAIFENESIN AND DEXTROMETHORPHAN 5 ML: 100; 10 SYRUP ORAL at 00:32

## 2022-07-26 ASSESSMENT — PAIN - FUNCTIONAL ASSESSMENT: PAIN_FUNCTIONAL_ASSESSMENT: NONE - DENIES PAIN

## 2022-07-26 NOTE — DISCHARGE INSTRUCTIONS
The patient has what appears to be a mild pneumonia. Patient has been given Levaquin and cough medication she is instructed to take those as prescribed. She is instructed to use Tylenol Motrin for any pain or fevers. She is instructed to follow-up with a primary care physician to do so within the next 1 to 2 days. She is instructed return to the nearest emergency room immediately for any new or worsening complaints.

## 2022-07-26 NOTE — ED NOTES
Pt resting in bed with eyes closed upon entering room. Pt states she is feeling better at this time. VSS.       Erum Chaudhary RN  07/26/22 [id number]

## 2022-11-23 ENCOUNTER — APPOINTMENT (OUTPATIENT)
Dept: GENERAL RADIOLOGY | Age: 67
End: 2022-11-23
Payer: MEDICARE

## 2022-11-23 ENCOUNTER — HOSPITAL ENCOUNTER (EMERGENCY)
Age: 67
Discharge: HOME | End: 2022-11-23
Attending: EMERGENCY MEDICINE
Payer: MEDICARE

## 2022-11-23 VITALS
BODY MASS INDEX: 23.16 KG/M2 | DIASTOLIC BLOOD PRESSURE: 90 MMHG | TEMPERATURE: 97.5 F | HEIGHT: 65 IN | OXYGEN SATURATION: 96 % | HEART RATE: 85 BPM | RESPIRATION RATE: 20 BRPM | WEIGHT: 139 LBS | SYSTOLIC BLOOD PRESSURE: 166 MMHG

## 2022-11-23 DIAGNOSIS — I10 PRIMARY HYPERTENSION: Primary | ICD-10-CM

## 2022-11-23 LAB
ALBUMIN SERPL-MCNC: 4.3 G/DL (ref 3.5–5.1)
ALP BLD-CCNC: 104 U/L (ref 38–126)
ALT SERPL-CCNC: 39 U/L (ref 11–66)
ANION GAP SERPL CALCULATED.3IONS-SCNC: 13 MEQ/L (ref 8–16)
AST SERPL-CCNC: 24 U/L (ref 5–40)
BASOPHILS # BLD: 0.4 %
BASOPHILS ABSOLUTE: 0 THOU/MM3 (ref 0–0.1)
BILIRUB SERPL-MCNC: 0.3 MG/DL (ref 0.3–1.2)
BUN BLDV-MCNC: 10 MG/DL (ref 7–22)
CALCIUM SERPL-MCNC: 9.3 MG/DL (ref 8.5–10.5)
CHLORIDE BLD-SCNC: 103 MEQ/L (ref 98–111)
CO2: 24 MEQ/L (ref 23–33)
CREAT SERPL-MCNC: 0.9 MG/DL (ref 0.4–1.2)
EOSINOPHIL # BLD: 1.1 %
EOSINOPHILS ABSOLUTE: 0.1 THOU/MM3 (ref 0–0.4)
ERYTHROCYTE [DISTWIDTH] IN BLOOD BY AUTOMATED COUNT: 13.4 % (ref 11.5–14.5)
ERYTHROCYTE [DISTWIDTH] IN BLOOD BY AUTOMATED COUNT: 41.9 FL (ref 35–45)
GFR SERPL CREATININE-BSD FRML MDRD: > 60 ML/MIN/1.73M2
GLUCOSE BLD-MCNC: 154 MG/DL (ref 70–108)
HCT VFR BLD CALC: 41.4 % (ref 37–47)
HEMOGLOBIN: 14.1 GM/DL (ref 12–16)
IMMATURE GRANS (ABS): 0.03 THOU/MM3 (ref 0–0.07)
IMMATURE GRANULOCYTES: 0.4 %
LYMPHOCYTES # BLD: 17.8 %
LYMPHOCYTES ABSOLUTE: 1.4 THOU/MM3 (ref 1–4.8)
MAGNESIUM: 1.4 MG/DL (ref 1.6–2.4)
MCH RBC QN AUTO: 29.3 PG (ref 26–33)
MCHC RBC AUTO-ENTMCNC: 34.1 GM/DL (ref 32.2–35.5)
MCV RBC AUTO: 86.1 FL (ref 81–99)
MONOCYTES # BLD: 5.7 %
MONOCYTES ABSOLUTE: 0.5 THOU/MM3 (ref 0.4–1.3)
NUCLEATED RED BLOOD CELLS: 0 /100 WBC
OSMOLALITY CALCULATION: 281.5 MOSMOL/KG (ref 275–300)
PLATELET # BLD: 248 THOU/MM3 (ref 130–400)
PMV BLD AUTO: 9.7 FL (ref 9.4–12.4)
POTASSIUM SERPL-SCNC: 3.7 MEQ/L (ref 3.5–5.2)
RBC # BLD: 4.81 MILL/MM3 (ref 4.2–5.4)
SEG NEUTROPHILS: 74.6 %
SEGMENTED NEUTROPHILS ABSOLUTE COUNT: 5.9 THOU/MM3 (ref 1.8–7.7)
SODIUM BLD-SCNC: 140 MEQ/L (ref 135–145)
TOTAL PROTEIN: 6.6 G/DL (ref 6.1–8)
TROPONIN T: < 0.01 NG/ML
TSH SERPL DL<=0.05 MIU/L-ACNC: 0.46 UIU/ML (ref 0.4–4.2)
WBC # BLD: 7.9 THOU/MM3 (ref 4.8–10.8)

## 2022-11-23 PROCEDURE — 84443 ASSAY THYROID STIM HORMONE: CPT

## 2022-11-23 PROCEDURE — 71045 X-RAY EXAM CHEST 1 VIEW: CPT

## 2022-11-23 PROCEDURE — 6370000000 HC RX 637 (ALT 250 FOR IP): Performed by: EMERGENCY MEDICINE

## 2022-11-23 PROCEDURE — 85025 COMPLETE CBC W/AUTO DIFF WBC: CPT

## 2022-11-23 PROCEDURE — 36415 COLL VENOUS BLD VENIPUNCTURE: CPT

## 2022-11-23 PROCEDURE — 99284 EMERGENCY DEPT VISIT MOD MDM: CPT

## 2022-11-23 PROCEDURE — 84484 ASSAY OF TROPONIN QUANT: CPT

## 2022-11-23 PROCEDURE — 2500000003 HC RX 250 WO HCPCS: Performed by: EMERGENCY MEDICINE

## 2022-11-23 PROCEDURE — 93005 ELECTROCARDIOGRAM TRACING: CPT | Performed by: EMERGENCY MEDICINE

## 2022-11-23 PROCEDURE — 96374 THER/PROPH/DIAG INJ IV PUSH: CPT

## 2022-11-23 PROCEDURE — 80053 COMPREHEN METABOLIC PANEL: CPT

## 2022-11-23 PROCEDURE — 83735 ASSAY OF MAGNESIUM: CPT

## 2022-11-23 RX ORDER — METOPROLOL TARTRATE 5 MG/5ML
5 INJECTION INTRAVENOUS ONCE
Status: COMPLETED | OUTPATIENT
Start: 2022-11-23 | End: 2022-11-23

## 2022-11-23 RX ORDER — CLONIDINE HYDROCHLORIDE 0.1 MG/1
0.1 TABLET ORAL ONCE
Status: COMPLETED | OUTPATIENT
Start: 2022-11-23 | End: 2022-11-23

## 2022-11-23 RX ORDER — LANOLIN ALCOHOL/MO/W.PET/CERES
400 CREAM (GRAM) TOPICAL ONCE
Status: COMPLETED | OUTPATIENT
Start: 2022-11-23 | End: 2022-11-23

## 2022-11-23 RX ADMIN — Medication 400 MG: at 20:10

## 2022-11-23 RX ADMIN — CLONIDINE HYDROCHLORIDE 0.1 MG: 0.1 TABLET ORAL at 18:51

## 2022-11-23 RX ADMIN — METOPROLOL TARTRATE 5 MG: 5 INJECTION, SOLUTION INTRAVENOUS at 20:11

## 2022-11-23 ASSESSMENT — ENCOUNTER SYMPTOMS
EYE PAIN: 0
EYE DISCHARGE: 0
SINUS PRESSURE: 0
COLOR CHANGE: 0
CONSTIPATION: 0
TROUBLE SWALLOWING: 0
EYE REDNESS: 0
PHOTOPHOBIA: 0
RHINORRHEA: 0
BLOOD IN STOOL: 0
WHEEZING: 0
ABDOMINAL DISTENTION: 0
DIARRHEA: 0
COUGH: 0
NAUSEA: 0
SHORTNESS OF BREATH: 0
CHEST TIGHTNESS: 0
VOMITING: 0
VOICE CHANGE: 0
ABDOMINAL PAIN: 0
SORE THROAT: 0
EYE ITCHING: 0
CHOKING: 0
BACK PAIN: 0

## 2022-11-23 ASSESSMENT — PAIN - FUNCTIONAL ASSESSMENT
PAIN_FUNCTIONAL_ASSESSMENT: NONE - DENIES PAIN

## 2022-11-23 NOTE — ED TRIAGE NOTES
PT to the ED with complaint of hypertension. PT states she is on medication for hypertension and she thinks that she has been taking it as prescribed but she noticed today that it was high. PT denies any chest pain, nausea, shortness of breath or dizziness. Respirations even and unlabored.

## 2022-11-23 NOTE — ED PROVIDER NOTES
[facility]  eMERGENCY dEPARTMENT eNCOUnter          CHIEF COMPLAINT       Chief Complaint   Patient presents with    Hypertension       Nurses Notes reviewed and I agree except as noted in the HPI. HISTORY OF PRESENT ILLNESS    Mingo Rush is a 74 y.o. female who presents for hypertension. Apparently the patient states that she has been taking her hypertension medication however she has noticed over the last 2 days that her blood pressure has gone up. Patient has not called her primary care physician patient has no real associated symptoms. She does not have a headache or dizziness. She had no syncopal or presyncopal episodes. No chest pain or shortness of breath. Patient is otherwise resting comfortably on the cot no apparent distress no other physical complaints at this time. REVIEW OF SYSTEMS     Review of Systems   Constitutional:  Negative for activity change, appetite change, diaphoresis, fatigue and unexpected weight change. HENT:  Negative for congestion, ear discharge, ear pain, hearing loss, rhinorrhea, sinus pressure, sore throat, trouble swallowing and voice change. Eyes:  Negative for photophobia, pain, discharge, redness and itching. Respiratory:  Negative for cough, choking, chest tightness, shortness of breath and wheezing. Cardiovascular:  Negative for chest pain, palpitations and leg swelling. Gastrointestinal:  Negative for abdominal distention, abdominal pain, blood in stool, constipation, diarrhea, nausea and vomiting. Endocrine: Negative for polydipsia, polyphagia and polyuria. Genitourinary:  Negative for decreased urine volume, difficulty urinating, dysuria, enuresis, frequency, hematuria and urgency. Musculoskeletal:  Negative for arthralgias, back pain, gait problem, myalgias, neck pain and neck stiffness. Skin:  Negative for color change, pallor, rash and wound. Allergic/Immunologic: Negative for immunocompromised state. Neurological:  Negative for dizziness, tremors, seizures, syncope, facial asymmetry, weakness, light-headedness, numbness and headaches. Hematological:  Negative for adenopathy. Does not bruise/bleed easily. Psychiatric/Behavioral:  Negative for agitation, behavioral problems, confusion, hallucinations, self-injury and suicidal ideas. The patient is not nervous/anxious and is not hyperactive. PAST MEDICAL HISTORY    has a past medical history of Anxiety, COPD (chronic obstructive pulmonary disease) ([facility] [address] GERD (gastroesophageal reflux disease), Hyperlipidemia, Hypertension, Psychiatric problem, Seizures ([facility] [address] and Thyroid disease. SURGICAL HISTORY      has a past surgical history that includes Abdomen surgery; Colonoscopy; eye surgery (2010 approx); INOCENCIA STEREO BREAST BX W LOC DEVICE 1ST LESION LEFT (Left, 2012); and INOCENCIA STEREO BREAST BX W LOC DEVICE 1ST LESION RIGHT (Right, 2019). CURRENT MEDICATIONS       Discharge Medication List as of 11/23/2022  8:09 PM        CONTINUE these medications which have NOT CHANGED    Details   amLODIPine (NORVASC) 5 MG tablet Take 1 tablet by mouth daily Hold for SBP less than 100 mmHg, Disp-30 tablet, R-3Normal      Multiple Vitamin (MULTIVITAMIN) TABS tablet Take 1 tablet by mouth daily, Disp-30 tablet, R-0Normal      vitamin B-1 (THIAMINE) 100 MG tablet Take 1 tablet by mouth daily, Disp-30 tablet, [id number]      folic acid (FOLVITE) 1 MG tablet Take 1 tablet by mouth daily, Disp-30 tablet, R-0Normal      metFORMIN (GLUCOPHAGE-XR) 750 MG extended release tablet Take 750 mg by mouth Daily with supperHistorical Med      amitriptyline (ELAVIL) 150 MG tablet Take 150 mg by mouth nightlyHistorical Med      venlafaxine (EFFEXOR XR) 150 MG extended release capsule Take 150 mg by mouth dailyHistorical Med      cetirizine (ZYRTEC) 10 MG tablet Take 10 mg by mouth dailyHistorical Med      zolpidem (AMBIEN) 10 MG tablet Take 10 mg by mouth nightly. Historical Med alendronate (FOSAMAX) 70 MG tablet Take 70 mg by mouth every 7 daysHistorical Med      LORazepam (ATIVAN) 1 MG tablet Take 1 mg by mouth 3 times daily. Historical Med      atorvastatin (LIPITOR) 40 MG tablet Take 1 tablet by mouth every evening, Disp-30 tablet, R-0      lisinopril (PRINIVIL;ZESTRIL) 10 MG tablet Take 10 mg by mouth daily Historical Med      levothyroxine (SYNTHROID) 50 MCG tablet Take 1 tablet by mouth Daily, Disp-30 tablet, R-3      omeprazole (PRILOSEC) 20 MG capsule Take 20 mg by mouth 2 times daily as needed       fluticasone (FLONASE) 50 MCG/ACT nasal spray 2 sprays by Nasal route daily Indications: Use two sprays in each nostril once daily             ALLERGIES     is allergic to penicillins. FAMILY HISTORY     She indicated that her mother is . She indicated that her father is . She indicated that the status of her neg hx is unknown.   family history includes Esophageal Cancer (age of onset: 47) in her mother; Heart Disease in her mother; High Blood Pressure in her mother; High Cholesterol in her mother; Kidney Disease in her father. SOCIAL HISTORY      reports that she has quit smoking. She has a 1.50 pack-year smoking history. She has never used smokeless tobacco. She reports current alcohol use of about 2.0 standard drinks per week. She reports that she does not use drugs. PHYSICAL EXAM     INITIAL VITALS:  height is 5' 5\" (1.651 m) and weight is 139 lb (63 kg). Her oral temperature is 97.5 °F (36.4 °C). Her blood pressure is 166/90 (abnormal) and her pulse is 85. Her respiration is 20 and oxygen saturation is 96%. Physical Exam  Vitals and nursing note reviewed. Constitutional:       General: She is not in acute distress. Appearance: She is well-developed. She is not diaphoretic. HENT:      Head: Normocephalic and atraumatic. Right Ear: Tympanic membrane, ear canal and external ear normal. There is no impacted cerumen.       Left Ear: Tympanic membrane, ear canal and external ear normal. There is no impacted cerumen. Nose: Nose normal. No congestion or rhinorrhea. Mouth/Throat:      Pharynx: No oropharyngeal exudate. Eyes:      General: No scleral icterus. Right eye: No discharge. Left eye: No discharge. Conjunctiva/sclera: Conjunctivae normal.      Pupils: Pupils are equal, round, and reactive to light. Neck:      Thyroid: No thyromegaly. Vascular: No JVD. Trachea: No tracheal deviation. Cardiovascular:      Rate and Rhythm: Normal rate and regular rhythm. Heart sounds: Normal heart sounds, S1 normal and S2 normal. No murmur heard. No friction rub. No gallop. Pulmonary:      Effort: Pulmonary effort is normal.      Breath sounds: Normal breath sounds. No stridor. No wheezing, rhonchi or rales. Chest:      Chest wall: No tenderness. Abdominal:      General: Bowel sounds are normal. There is no distension. Palpations: Abdomen is soft. There is no mass. Tenderness: There is no abdominal tenderness. There is no guarding or rebound. Hernia: No hernia is present. Musculoskeletal:         General: No tenderness. Normal range of motion. Cervical back: Normal range of motion and neck supple. Lymphadenopathy:      Cervical: No cervical adenopathy. Skin:     General: Skin is warm and dry. Capillary Refill: Capillary refill takes less than 2 seconds. Findings: No bruising, ecchymosis, lesion or rash. Neurological:      General: No focal deficit present. Mental Status: She is alert and oriented to person, place, and time. Mental status is at baseline. GCS: GCS eye subscore is 4. GCS verbal subscore is 5. GCS motor subscore is 6. Cranial Nerves: Cranial nerves 2-12 are intact. No cranial nerve deficit. Sensory: Sensation is intact. No sensory deficit. Motor: Motor function is intact. No weakness. Coordination: Coordination is intact. Coordination normal.      Gait: Gait is intact. Gait normal.      Deep Tendon Reflexes: Reflexes are normal and symmetric. Reflexes normal.      Reflex Scores:       Tricep reflexes are 2+ on the right side and 2+ on the left side. Bicep reflexes are 2+ on the right side and 2+ on the left side. Brachioradialis reflexes are 2+ on the right side and 2+ on the left side. Patellar reflexes are 2+ on the right side and 2+ on the left side. Achilles reflexes are 2+ on the right side and 2+ on the left side. Psychiatric:         Mood and Affect: Mood normal.         Speech: Speech normal.         Behavior: Behavior normal.         Thought Content: Thought content normal.         Judgment: Judgment normal.         DIFFERENTIAL DIAGNOSIS:   Medication noncompliance, anxiety, essential hypertension    DIAGNOSTIC RESULTS     EKG: All EKG's are interpreted by the Emergency Department Physician who either signs or Co-signs this chart in the absence of a cardiologist.  None    RADIOLOGY: non-plain film images(s) such as CT, Ultrasound and MRI are read by the radiologist.  XR CHEST PORTABLE   Final Result   1. Streaky left basilar opacity likely representing atelectasis. This document has been electronically signed by:  Kristine Mina MD on    11/23/2022 07:06 PM            LABS:   Labs Reviewed   MAGNESIUM - Abnormal; Notable for the following components:       Result Value    Magnesium 1.4 (*)     All other components within normal limits   COMPREHENSIVE METABOLIC PANEL - Abnormal; Notable for the following components:    Glucose 154 (*)     All other components within normal limits   CBC WITH AUTO DIFFERENTIAL   TSH   TROPONIN   GLOMERULAR FILTRATION RATE, ESTIMATED   ANION GAP   OSMOLALITY       EMERGENCY DEPARTMENT COURSE:   Vitals:    Vitals:    11/23/22 1815 11/23/22 1848 11/23/22 1948 11/23/22 2014   BP: (!) 193/94 (!) 174/93 (!) 163/87 (!) 166/90   Pulse: (!) 105 98 (!) 101 85   Resp: 19 28 24 20   Temp: 97.5 °F (36.4 °C)      TempSrc: Oral      SpO2: 97% 96% 97% 96%   Weight: 139 lb (63 kg)      Height: 5' 5\" (1.651 m)        Patient was assessed at bedside labs and imaging were ordered. Here today reviewed all labs and imaging. Patient is given clonidine. All labs and imaging within normal limits. Patient's blood pressure did come down a little bit so subsequently we gave her some Lopressor here. Patient tide her over. She is instructed to follow-up with a primary care physician and do so within the next 1 to 2 days. She is instructed follow-up with the primary care physician as they may need to titrate her medication. Patient understood and agreed with the plan. Patient is subsequently discharged home in stable condition. Patient has what appears to be hypertension. Patient is instructed to follow-up with the primary care physician and do so within the next 1 to 2 days. She is instructed to take all current medications as prescribed. She is instructed return to the nearest emergency room immediately for any new or worsening complaints. CRITICAL CARE:   None    CONSULTS:  None    PROCEDURES:  None    FINAL IMPRESSION      1.  Primary hypertension          DISPOSITION/PLAN   Discharge    PATIENT REFERRED TO:  Steve Cruz  [address] [facility] [address]  [phone]    Call in 1 day      DISCHARGE MEDICATIONS:  Discharge Medication List as of 11/23/2022  8:09 PM          (Please note that portions of this note were completed with a voice recognition program.  Efforts were made to edit the dictations but occasionally words are mis-transcribed.)    Giorgio Owens, [address], DO  11/24/22 3476

## 2022-11-24 LAB
EKG ATRIAL RATE: 105 BPM
EKG P AXIS: 44 DEGREES
EKG P-R INTERVAL: 172 MS
EKG Q-T INTERVAL: 364 MS
EKG QRS DURATION: 98 MS
EKG QTC CALCULATION (BAZETT): 481 MS
EKG R AXIS: 3 DEGREES
EKG T AXIS: 41 DEGREES
EKG VENTRICULAR RATE: 105 BPM

## 2022-11-24 PROCEDURE — 93010 ELECTROCARDIOGRAM REPORT: CPT | Performed by: INTERNAL MEDICINE

## 2022-11-24 NOTE — ED NOTES
PT medicated per MAR. Respirations even and unlabored. PT and VS assessed. PT denies any needs at this time.            Caleb Morgan RN  11/23/22 2015
PT resting in bed. Respirations even and unlabored. PT and VS assessed. PT stable at this time.      Kylah Diop RN  11/23/22 8753
PT resting in bed. Respirations even and unlabored. PT and VS assessed. PT stable at this time.      Luis Alberto Allen RN  11/23/22 1949
0

## 2022-12-27 NOTE — ED NOTES
Patient attempt urination with no success. Patient has tardive dyskinesia and very dry mouth at this time. Patient denies pain.   states she has been out of ativan for two weeks and will be out until July 1st.       Marilynn Steen, [address] [place] [address]  06/25/21 5932 Please see below message and advise.

## 2023-02-16 ENCOUNTER — APPOINTMENT (OUTPATIENT)
Dept: CT IMAGING | Age: 68
DRG: 917 | End: 2023-02-16
Payer: MEDICARE

## 2023-02-16 ENCOUNTER — HOSPITAL ENCOUNTER (INPATIENT)
Age: 68
LOS: 2 days | Discharge: HOME | DRG: 917 | End: 2023-02-18
Attending: FAMILY MEDICINE | Admitting: HOSPITALIST
Payer: MEDICARE

## 2023-02-16 DIAGNOSIS — F13.20 BENZODIAZEPINE DEPENDENCE (HCC): ICD-10-CM

## 2023-02-16 DIAGNOSIS — R41.82 ALTERED MENTAL STATUS, UNSPECIFIED ALTERED MENTAL STATUS TYPE: Primary | ICD-10-CM

## 2023-02-16 DIAGNOSIS — F13.930 BENZODIAZEPINE WITHDRAWAL WITHOUT COMPLICATION (HCC): ICD-10-CM

## 2023-02-16 DIAGNOSIS — F41.9 ANXIETY: ICD-10-CM

## 2023-02-16 DIAGNOSIS — R94.30 EJECTION FRACTION < 50%: ICD-10-CM

## 2023-02-16 PROBLEM — F10.239 ALCOHOL DEPENDENCE WITH WITHDRAWAL (HCC): Status: ACTIVE | Noted: 2023-02-16

## 2023-02-16 LAB
ALBUMIN SERPL BCG-MCNC: 3.9 G/DL (ref 3.5–5.1)
ALP SERPL-CCNC: 118 U/L (ref 38–126)
ALT SERPL W/O P-5'-P-CCNC: 43 U/L (ref 11–66)
AMPHETAMINES UR QL SCN: NEGATIVE
ANION GAP SERPL CALC-SCNC: 17 MEQ/L (ref 8–16)
AST SERPL-CCNC: 29 U/L (ref 5–40)
BARBITURATES UR QL SCN: NEGATIVE
BASOPHILS ABSOLUTE: 0 THOU/MM3 (ref 0–0.1)
BASOPHILS NFR BLD AUTO: 0.7 %
BENZODIAZ UR QL SCN: NEGATIVE
BILIRUB SERPL-MCNC: 0.2 MG/DL (ref 0.3–1.2)
BUN SERPL-MCNC: 12 MG/DL (ref 7–22)
BZE UR QL SCN: NEGATIVE
CALCIUM SERPL-MCNC: 9.2 MG/DL (ref 8.5–10.5)
CANNABINOIDS UR QL SCN: NEGATIVE
CHLORIDE SERPL-SCNC: 101 MEQ/L (ref 98–111)
CO2 SERPL-SCNC: 20 MEQ/L (ref 23–33)
CREAT SERPL-MCNC: 0.7 MG/DL (ref 0.4–1.2)
DEPRECATED RDW RBC AUTO: 42.5 FL (ref 35–45)
EOSINOPHIL NFR BLD AUTO: 2.2 %
EOSINOPHILS ABSOLUTE: 0.2 THOU/MM3 (ref 0–0.4)
ERYTHROCYTE [DISTWIDTH] IN BLOOD BY AUTOMATED COUNT: 13.1 % (ref 11.5–14.5)
ETHANOL SERPL-MCNC: < 0.01 %
FENTANYL: NEGATIVE
GFR SERPL CREATININE-BSD FRML MDRD: > 60 ML/MIN/1.73M2
GLUCOSE BLD STRIP.AUTO-MCNC: 266 MG/DL (ref 70–108)
GLUCOSE BLD-MCNC: 266 MG/DL
GLUCOSE SERPL-MCNC: 240 MG/DL (ref 70–108)
HCT VFR BLD AUTO: 39.5 % (ref 37–47)
HGB BLD-MCNC: 13.2 GM/DL (ref 12–16)
IMM GRANULOCYTES # BLD AUTO: 0.04 THOU/MM3 (ref 0–0.07)
IMM GRANULOCYTES NFR BLD AUTO: 0.6 %
INR PPP: 0.93 (ref 0.85–1.13)
LYMPHOCYTES ABSOLUTE: 2 THOU/MM3 (ref 1–4.8)
LYMPHOCYTES NFR BLD AUTO: 28.5 %
MCH RBC QN AUTO: 29.5 PG (ref 26–33)
MCHC RBC AUTO-ENTMCNC: 33.4 GM/DL (ref 32.2–35.5)
MCV RBC AUTO: 88.4 FL (ref 81–99)
MONOCYTES ABSOLUTE: 0.4 THOU/MM3 (ref 0.4–1.3)
MONOCYTES NFR BLD AUTO: 5.7 %
NEUTROPHILS NFR BLD AUTO: 62.3 %
NRBC BLD AUTO-RTO: 0 /100 WBC
OPIATES UR QL SCN: NEGATIVE
OSMOLALITY SERPL CALC.SUM OF ELEC: 283.3 MOSMOL/KG (ref 275–300)
OXYCODONE: NEGATIVE
PCP UR QL SCN: NEGATIVE
PLATELET # BLD AUTO: 204 THOU/MM3 (ref 130–400)
PMV BLD AUTO: 9.4 FL (ref 9.4–12.4)
POTASSIUM SERPL-SCNC: 3.4 MEQ/L (ref 3.5–5.2)
PROT SERPL-MCNC: 6.9 G/DL (ref 6.1–8)
RBC # BLD AUTO: 4.47 MILL/MM3 (ref 4.2–5.4)
SEGMENTED NEUTROPHILS ABSOLUTE COUNT: 4.3 THOU/MM3 (ref 1.8–7.7)
SODIUM SERPL-SCNC: 138 MEQ/L (ref 135–145)
TROPONIN T: < 0.01 NG/ML
WBC # BLD AUTO: 6.9 THOU/MM3 (ref 4.8–10.8)

## 2023-02-16 PROCEDURE — 80053 COMPREHEN METABOLIC PANEL: CPT

## 2023-02-16 PROCEDURE — 84484 ASSAY OF TROPONIN QUANT: CPT

## 2023-02-16 PROCEDURE — 70498 CT ANGIOGRAPHY NECK: CPT

## 2023-02-16 PROCEDURE — 82077 ASSAY SPEC XCP UR&BREATH IA: CPT

## 2023-02-16 PROCEDURE — 85610 PROTHROMBIN TIME: CPT

## 2023-02-16 PROCEDURE — 36415 COLL VENOUS BLD VENIPUNCTURE: CPT

## 2023-02-16 PROCEDURE — 93005 ELECTROCARDIOGRAM TRACING: CPT | Performed by: EMERGENCY MEDICINE

## 2023-02-16 PROCEDURE — 99223 1ST HOSP IP/OBS HIGH 75: CPT | Performed by: HOSPITALIST

## 2023-02-16 PROCEDURE — 85025 COMPLETE CBC W/AUTO DIFF WBC: CPT

## 2023-02-16 PROCEDURE — 70450 CT HEAD/BRAIN W/O DYE: CPT

## 2023-02-16 PROCEDURE — 70496 CT ANGIOGRAPHY HEAD: CPT

## 2023-02-16 PROCEDURE — 6360000004 HC RX CONTRAST MEDICATION: Performed by: FAMILY MEDICINE

## 2023-02-16 PROCEDURE — 80307 DRUG TEST PRSMV CHEM ANLYZR: CPT

## 2023-02-16 PROCEDURE — 2060000000 HC ICU INTERMEDIATE R&B

## 2023-02-16 PROCEDURE — 82948 REAGENT STRIP/BLOOD GLUCOSE: CPT

## 2023-02-16 PROCEDURE — 99285 EMERGENCY DEPT VISIT HI MDM: CPT

## 2023-02-16 RX ORDER — MAGNESIUM SULFATE HEPTAHYDRATE 40 MG/ML
2000 INJECTION, SOLUTION INTRAVENOUS ONCE
Status: COMPLETED | OUTPATIENT
Start: 2023-02-17 | End: 2023-02-17

## 2023-02-16 RX ORDER — SODIUM CHLORIDE 9 MG/ML
INJECTION, SOLUTION INTRAVENOUS CONTINUOUS
Status: DISCONTINUED | OUTPATIENT
Start: 2023-02-17 | End: 2023-02-18

## 2023-02-16 RX ADMIN — IOPAMIDOL 80 ML: 755 INJECTION, SOLUTION INTRAVENOUS at 20:14

## 2023-02-17 PROBLEM — R41.82 ALTERED MENTAL STATUS: Status: ACTIVE | Noted: 2023-02-17

## 2023-02-17 PROBLEM — F10.939 ALCOHOL WITHDRAWAL SYNDROME WITH COMPLICATION (HCC): Status: ACTIVE | Noted: 2023-02-17

## 2023-02-17 LAB
AMPHETAMINES UR QL SCN: NEGATIVE
ANION GAP SERPL CALC-SCNC: 11 MEQ/L (ref 8–16)
BARBITURATES UR QL SCN: NEGATIVE
BENZODIAZ UR QL SCN: NEGATIVE
BILIRUB UR QL STRIP.AUTO: NEGATIVE
BUN SERPL-MCNC: 12 MG/DL (ref 7–22)
BZE UR QL SCN: NEGATIVE
CA-I BLD ISE-SCNC: 1.16 MMOL/L (ref 1.12–1.32)
CALCIUM SERPL-MCNC: 8.7 MG/DL (ref 8.5–10.5)
CANNABINOIDS UR QL SCN: NEGATIVE
CHARACTER UR: CLEAR
CHLORIDE SERPL-SCNC: 105 MEQ/L (ref 98–111)
CO2 SERPL-SCNC: 24 MEQ/L (ref 23–33)
COLOR: YELLOW
CREAT SERPL-MCNC: 0.6 MG/DL (ref 0.4–1.2)
DEPRECATED MEAN GLUCOSE BLD GHB EST-ACNC: 117 MG/DL (ref 70–126)
DEPRECATED RDW RBC AUTO: 44 FL (ref 35–45)
EKG ATRIAL RATE: 114 BPM
EKG ATRIAL RATE: 95 BPM
EKG P AXIS: 34 DEGREES
EKG P AXIS: 40 DEGREES
EKG P-R INTERVAL: 178 MS
EKG P-R INTERVAL: 192 MS
EKG Q-T INTERVAL: 366 MS
EKG Q-T INTERVAL: 398 MS
EKG QRS DURATION: 112 MS
EKG QRS DURATION: 116 MS
EKG QTC CALCULATION (BAZETT): 500 MS
EKG QTC CALCULATION (BAZETT): 504 MS
EKG R AXIS: -3 DEGREES
EKG R AXIS: 4 DEGREES
EKG T AXIS: 39 DEGREES
EKG T AXIS: 44 DEGREES
EKG VENTRICULAR RATE: 114 BPM
EKG VENTRICULAR RATE: 95 BPM
ERYTHROCYTE [DISTWIDTH] IN BLOOD BY AUTOMATED COUNT: 13.2 % (ref 11.5–14.5)
FENTANYL: NEGATIVE
GFR SERPL CREATININE-BSD FRML MDRD: > 60 ML/MIN/1.73M2
GLUCOSE BLD STRIP.AUTO-MCNC: 118 MG/DL (ref 70–108)
GLUCOSE BLD STRIP.AUTO-MCNC: 124 MG/DL (ref 70–108)
GLUCOSE BLD STRIP.AUTO-MCNC: 163 MG/DL (ref 70–108)
GLUCOSE BLD STRIP.AUTO-MCNC: 173 MG/DL (ref 70–108)
GLUCOSE SERPL-MCNC: 113 MG/DL (ref 70–108)
GLUCOSE UR QL STRIP.AUTO: NEGATIVE MG/DL
HBA1C MFR BLD HPLC: 5.9 % (ref 4.4–6.4)
HCT VFR BLD AUTO: 37.9 % (ref 37–47)
HGB BLD-MCNC: 11.9 GM/DL (ref 12–16)
HGB UR QL STRIP.AUTO: NEGATIVE
KETONES UR QL STRIP.AUTO: NEGATIVE
LV EF: 45 %
LVEF MODALITY: NORMAL
MAGNESIUM SERPL-MCNC: 2 MG/DL (ref 1.6–2.4)
MCH RBC QN AUTO: 28.7 PG (ref 26–33)
MCHC RBC AUTO-ENTMCNC: 31.4 GM/DL (ref 32.2–35.5)
MCV RBC AUTO: 91.3 FL (ref 81–99)
NITRITE UR QL STRIP: NEGATIVE
OPIATES UR QL SCN: NEGATIVE
OXYCODONE: NEGATIVE
PCP UR QL SCN: NEGATIVE
PH UR STRIP.AUTO: 5.5 [PH] (ref 5–9)
PHOSPHATE SERPL-MCNC: 3.5 MG/DL (ref 2.4–4.7)
PLATELET # BLD AUTO: 189 THOU/MM3 (ref 130–400)
PMV BLD AUTO: 9.3 FL (ref 9.4–12.4)
POTASSIUM SERPL-SCNC: 3.9 MEQ/L (ref 3.5–5.2)
PROT UR STRIP.AUTO-MCNC: NEGATIVE MG/DL
RBC # BLD AUTO: 4.15 MILL/MM3 (ref 4.2–5.4)
SODIUM SERPL-SCNC: 140 MEQ/L (ref 135–145)
SP GR UR REFRACT.AUTO: 1.01 (ref 1–1.03)
T4 FREE SERPL-MCNC: 0.95 NG/DL (ref 0.93–1.76)
TSH SERPL DL<=0.005 MIU/L-ACNC: 6.11 UIU/ML (ref 0.4–4.2)
UROBILINOGEN, URINE: 0.2 EU/DL (ref 0–1)
WBC # BLD AUTO: 7.2 THOU/MM3 (ref 4.8–10.8)
WBC #/AREA URNS HPF: NEGATIVE /[HPF]

## 2023-02-17 PROCEDURE — 84439 ASSAY OF FREE THYROXINE: CPT

## 2023-02-17 PROCEDURE — 85027 COMPLETE CBC AUTOMATED: CPT

## 2023-02-17 PROCEDURE — 82948 REAGENT STRIP/BLOOD GLUCOSE: CPT

## 2023-02-17 PROCEDURE — 93010 ELECTROCARDIOGRAM REPORT: CPT | Performed by: INTERNAL MEDICINE

## 2023-02-17 PROCEDURE — 80048 BASIC METABOLIC PNL TOTAL CA: CPT

## 2023-02-17 PROCEDURE — 36415 COLL VENOUS BLD VENIPUNCTURE: CPT

## 2023-02-17 PROCEDURE — 84100 ASSAY OF PHOSPHORUS: CPT

## 2023-02-17 PROCEDURE — 93005 ELECTROCARDIOGRAM TRACING: CPT | Performed by: STUDENT IN AN ORGANIZED HEALTH CARE EDUCATION/TRAINING PROGRAM

## 2023-02-17 PROCEDURE — 80307 DRUG TEST PRSMV CHEM ANLYZR: CPT

## 2023-02-17 PROCEDURE — 99233 SBSQ HOSP IP/OBS HIGH 50: CPT | Performed by: INTERNAL MEDICINE

## 2023-02-17 PROCEDURE — 6370000000 HC RX 637 (ALT 250 FOR IP): Performed by: STUDENT IN AN ORGANIZED HEALTH CARE EDUCATION/TRAINING PROGRAM

## 2023-02-17 PROCEDURE — 2060000000 HC ICU INTERMEDIATE R&B

## 2023-02-17 PROCEDURE — 6370000000 HC RX 637 (ALT 250 FOR IP): Performed by: INTERNAL MEDICINE

## 2023-02-17 PROCEDURE — 6360000002 HC RX W HCPCS: Performed by: STUDENT IN AN ORGANIZED HEALTH CARE EDUCATION/TRAINING PROGRAM

## 2023-02-17 PROCEDURE — 81003 URINALYSIS AUTO W/O SCOPE: CPT

## 2023-02-17 PROCEDURE — 83735 ASSAY OF MAGNESIUM: CPT

## 2023-02-17 PROCEDURE — 93306 TTE W/DOPPLER COMPLETE: CPT

## 2023-02-17 PROCEDURE — 84443 ASSAY THYROID STIM HORMONE: CPT

## 2023-02-17 PROCEDURE — 82330 ASSAY OF CALCIUM: CPT

## 2023-02-17 PROCEDURE — 83036 HEMOGLOBIN GLYCOSYLATED A1C: CPT

## 2023-02-17 PROCEDURE — 2580000003 HC RX 258: Performed by: STUDENT IN AN ORGANIZED HEALTH CARE EDUCATION/TRAINING PROGRAM

## 2023-02-17 RX ORDER — SODIUM CHLORIDE 9 MG/ML
INJECTION, SOLUTION INTRAVENOUS PRN
Status: DISCONTINUED | OUTPATIENT
Start: 2023-02-17 | End: 2023-02-18 | Stop reason: HOSPADM

## 2023-02-17 RX ORDER — SODIUM CHLORIDE 0.9 % (FLUSH) 0.9 %
10 SYRINGE (ML) INJECTION PRN
Status: DISCONTINUED | OUTPATIENT
Start: 2023-02-17 | End: 2023-02-18 | Stop reason: HOSPADM

## 2023-02-17 RX ORDER — PHENOBARBITAL SODIUM 65 MG/ML
260 INJECTION INTRAMUSCULAR
Status: DISCONTINUED | OUTPATIENT
Start: 2023-02-17 | End: 2023-02-17

## 2023-02-17 RX ORDER — INSULIN LISPRO 100 [IU]/ML
0-4 INJECTION, SOLUTION INTRAVENOUS; SUBCUTANEOUS NIGHTLY
Status: DISCONTINUED | OUTPATIENT
Start: 2023-02-17 | End: 2023-02-18 | Stop reason: HOSPADM

## 2023-02-17 RX ORDER — LISINOPRIL 10 MG/1
10 TABLET ORAL DAILY
Status: DISCONTINUED | OUTPATIENT
Start: 2023-02-17 | End: 2023-02-17

## 2023-02-17 RX ORDER — ACETAMINOPHEN 325 MG/1
650 TABLET ORAL EVERY 6 HOURS PRN
Status: DISCONTINUED | OUTPATIENT
Start: 2023-02-17 | End: 2023-02-18 | Stop reason: HOSPADM

## 2023-02-17 RX ORDER — AMLODIPINE BESYLATE 5 MG/1
5 TABLET ORAL DAILY
Status: DISCONTINUED | OUTPATIENT
Start: 2023-02-17 | End: 2023-02-17

## 2023-02-17 RX ORDER — PROPRANOLOL HYDROCHLORIDE 10 MG/1
10 TABLET ORAL 3 TIMES DAILY
Status: DISCONTINUED | OUTPATIENT
Start: 2023-02-17 | End: 2023-02-18 | Stop reason: HOSPADM

## 2023-02-17 RX ORDER — POLYETHYLENE GLYCOL 3350 17 G/17G
17 POWDER, FOR SOLUTION ORAL DAILY PRN
Status: DISCONTINUED | OUTPATIENT
Start: 2023-02-17 | End: 2023-02-18 | Stop reason: HOSPADM

## 2023-02-17 RX ORDER — BUSPIRONE HYDROCHLORIDE 10 MG/1
10 TABLET ORAL 2 TIMES DAILY
Status: DISCONTINUED | OUTPATIENT
Start: 2023-02-17 | End: 2023-02-18 | Stop reason: HOSPADM

## 2023-02-17 RX ORDER — MULTIVITAMIN WITH IRON
1 TABLET ORAL DAILY
Status: DISCONTINUED | OUTPATIENT
Start: 2023-02-17 | End: 2023-02-18 | Stop reason: HOSPADM

## 2023-02-17 RX ORDER — PROPRANOLOL HYDROCHLORIDE 20 MG/1
20 TABLET ORAL 3 TIMES DAILY
Status: DISCONTINUED | OUTPATIENT
Start: 2023-02-17 | End: 2023-02-17

## 2023-02-17 RX ORDER — ENOXAPARIN SODIUM 100 MG/ML
40 INJECTION SUBCUTANEOUS DAILY
Status: DISCONTINUED | OUTPATIENT
Start: 2023-02-17 | End: 2023-02-18 | Stop reason: HOSPADM

## 2023-02-17 RX ORDER — LISINOPRIL 10 MG/1
10 TABLET ORAL DAILY
Status: DISCONTINUED | OUTPATIENT
Start: 2023-02-18 | End: 2023-02-17

## 2023-02-17 RX ORDER — LISINOPRIL 20 MG/1
20 TABLET ORAL DAILY
Status: DISCONTINUED | OUTPATIENT
Start: 2023-02-18 | End: 2023-02-18 | Stop reason: HOSPADM

## 2023-02-17 RX ORDER — LANOLIN ALCOHOL/MO/W.PET/CERES
100 CREAM (GRAM) TOPICAL DAILY
Status: DISCONTINUED | OUTPATIENT
Start: 2023-02-17 | End: 2023-02-18 | Stop reason: HOSPADM

## 2023-02-17 RX ORDER — SODIUM CHLORIDE 0.9 % (FLUSH) 0.9 %
5-40 SYRINGE (ML) INJECTION EVERY 12 HOURS SCHEDULED
Status: DISCONTINUED | OUTPATIENT
Start: 2023-02-17 | End: 2023-02-18 | Stop reason: HOSPADM

## 2023-02-17 RX ORDER — ONDANSETRON 2 MG/ML
4 INJECTION INTRAMUSCULAR; INTRAVENOUS EVERY 6 HOURS PRN
Status: DISCONTINUED | OUTPATIENT
Start: 2023-02-17 | End: 2023-02-18 | Stop reason: HOSPADM

## 2023-02-17 RX ORDER — LORAZEPAM 1 MG/1
1 TABLET ORAL 3 TIMES DAILY
Status: DISCONTINUED | OUTPATIENT
Start: 2023-02-17 | End: 2023-02-18 | Stop reason: HOSPADM

## 2023-02-17 RX ORDER — LEVOTHYROXINE SODIUM 0.1 MG/1
100 TABLET ORAL DAILY
Status: DISCONTINUED | OUTPATIENT
Start: 2023-02-17 | End: 2023-02-18 | Stop reason: HOSPADM

## 2023-02-17 RX ORDER — PANTOPRAZOLE SODIUM 40 MG/1
40 TABLET, DELAYED RELEASE ORAL
Status: DISCONTINUED | OUTPATIENT
Start: 2023-02-17 | End: 2023-02-18 | Stop reason: HOSPADM

## 2023-02-17 RX ORDER — FOLIC ACID 1 MG/1
1 TABLET ORAL DAILY
Status: DISCONTINUED | OUTPATIENT
Start: 2023-02-17 | End: 2023-02-18 | Stop reason: HOSPADM

## 2023-02-17 RX ORDER — DEXTROSE MONOHYDRATE 100 MG/ML
INJECTION, SOLUTION INTRAVENOUS CONTINUOUS PRN
Status: DISCONTINUED | OUTPATIENT
Start: 2023-02-17 | End: 2023-02-18 | Stop reason: HOSPADM

## 2023-02-17 RX ORDER — LEVOTHYROXINE SODIUM 0.05 MG/1
50 TABLET ORAL DAILY
Status: DISCONTINUED | OUTPATIENT
Start: 2023-02-17 | End: 2023-02-17

## 2023-02-17 RX ORDER — ACETAMINOPHEN 650 MG/1
650 SUPPOSITORY RECTAL EVERY 6 HOURS PRN
Status: DISCONTINUED | OUTPATIENT
Start: 2023-02-17 | End: 2023-02-18 | Stop reason: HOSPADM

## 2023-02-17 RX ORDER — PHENOBARBITAL SODIUM 65 MG/ML
130 INJECTION INTRAMUSCULAR
Status: DISCONTINUED | OUTPATIENT
Start: 2023-02-17 | End: 2023-02-17

## 2023-02-17 RX ORDER — LORAZEPAM 2 MG/ML
1 INJECTION INTRAMUSCULAR EVERY 6 HOURS PRN
Status: DISCONTINUED | OUTPATIENT
Start: 2023-02-17 | End: 2023-02-18 | Stop reason: HOSPADM

## 2023-02-17 RX ORDER — ATORVASTATIN CALCIUM 40 MG/1
40 TABLET, FILM COATED ORAL EVERY EVENING
Status: DISCONTINUED | OUTPATIENT
Start: 2023-02-17 | End: 2023-02-18 | Stop reason: HOSPADM

## 2023-02-17 RX ORDER — INSULIN LISPRO 100 [IU]/ML
0-8 INJECTION, SOLUTION INTRAVENOUS; SUBCUTANEOUS
Status: DISCONTINUED | OUTPATIENT
Start: 2023-02-17 | End: 2023-02-18 | Stop reason: HOSPADM

## 2023-02-17 RX ORDER — ONDANSETRON 4 MG/1
4 TABLET, ORALLY DISINTEGRATING ORAL EVERY 8 HOURS PRN
Status: DISCONTINUED | OUTPATIENT
Start: 2023-02-17 | End: 2023-02-18 | Stop reason: HOSPADM

## 2023-02-17 RX ORDER — LISINOPRIL 20 MG/1
20 TABLET ORAL DAILY
Status: DISCONTINUED | OUTPATIENT
Start: 2023-02-18 | End: 2023-02-17

## 2023-02-17 RX ADMIN — LORAZEPAM 1 MG: 1 TABLET ORAL at 14:40

## 2023-02-17 RX ADMIN — LEVOTHYROXINE SODIUM 100 MCG: 100 TABLET ORAL at 10:34

## 2023-02-17 RX ADMIN — FOLIC ACID 1 MG: 1 TABLET ORAL at 08:40

## 2023-02-17 RX ADMIN — ENOXAPARIN SODIUM 40 MG: 100 INJECTION SUBCUTANEOUS at 08:40

## 2023-02-17 RX ADMIN — SODIUM CHLORIDE: 9 INJECTION, SOLUTION INTRAVENOUS at 00:43

## 2023-02-17 RX ADMIN — PROPRANOLOL HYDROCHLORIDE 10 MG: 10 TABLET ORAL at 20:03

## 2023-02-17 RX ADMIN — Medication 1 TABLET: at 08:40

## 2023-02-17 RX ADMIN — PANTOPRAZOLE SODIUM 40 MG: 40 TABLET, DELAYED RELEASE ORAL at 06:15

## 2023-02-17 RX ADMIN — ATORVASTATIN CALCIUM 40 MG: 40 TABLET, FILM COATED ORAL at 20:03

## 2023-02-17 RX ADMIN — LORAZEPAM 1 MG: 1 TABLET ORAL at 20:03

## 2023-02-17 RX ADMIN — MAGNESIUM SULFATE HEPTAHYDRATE 2000 MG: 40 INJECTION, SOLUTION INTRAVENOUS at 00:46

## 2023-02-17 RX ADMIN — Medication 100 MG: at 08:40

## 2023-02-17 RX ADMIN — PROPRANOLOL HYDROCHLORIDE 20 MG: 20 TABLET ORAL at 16:44

## 2023-02-17 RX ADMIN — LORAZEPAM 1 MG: 1 TABLET ORAL at 10:37

## 2023-02-17 RX ADMIN — BUSPIRONE HYDROCHLORIDE 10 MG: 10 TABLET ORAL at 20:03

## 2023-02-17 ASSESSMENT — PAIN SCALES - GENERAL
PAINLEVEL_OUTOF10: 0
PAINLEVEL_OUTOF10: 0

## 2023-02-17 NOTE — PROGRESS NOTES
Hospitalist Progress Note    Patient:  Ray Weinstein      Unit/Bed:4A-05/005-A    YOB: 1955    MRN: [id number]       Acct: [de-identified]     PCP: Agustín Carey    Date of Admission: 2/16/2023    Assessment/Plan:    1) Drug induced encephalopathy: Likely due to polypharmacy and EtOH intake. Patient reports taking Ativan 1 mg four times daily for anxiety (should be taking 1 mg TID). Imaging including CT head and CTA head/neck showed no acute intracranial findings or stenosis. Troponin WNL and EKG without ischemic changes. UDS negative. Serum EtOH <0.01. Patient is alert and oriented however she is anxious/jittery.   -Concern for benzo withdrawal, continue to monitor symptoms and start on scheduled Ativan 1 mg TID. PRN ativan 1 mg q 6 hours if needed for breakthrough/withdrawal seizures.   -Will consult psychiatrist inpatient     2) EtOH abuse/withdrawal: Per significant other and chart. Patient drinks about 2 tall beers daily or more. Last drink was 2/16/23. Serum EtOH <0.01. Alcohol was combined with multiple anti anxiolytics. Patient had history of withdrawal seizures, no Dts. She also underwent prior rehab for EtOH and benzo use. PAWS score 5. Patient denies having any visual or auditory hallucinations. She does has some tachycardia and HTN. -Stopped phenobarb protocol as symptoms correlate more with benzo withdrawal  -Continue Thiamine, folic acid, and multivitamin   -Addiction services consulted     3) Prolonged Qtc: Noted on initial EKG, Qtc 504. Given 2g magnesium, mag level 2. Repeat EKG shows Qtc 500. Hold Zofran.   -Continue to monitor, repeat Mag level tomorrow AM    4) Hypertension: Patient reports not taking Norvasc at home.  -Increased home Lisinopril from 10 mg to 20 mg due to high BP    5) Hyperlipidemia: Continue Lipitor 40 mg oral daily. 6) DMII: Hgb A1c 5.9.  On Metformin at home, will hold while inpatient.   -Continue Medium SSI and Accu-checks.   -Hypoglycemia protocol in place    7) Hypothyroidism: TSH 6.110. T4 Free 0.95.   -Continue home dose Synthroid 100 mcg (Initially was started on 50 however patient reports taking 100, per pharmacy as well)    8) GERD: Continue Protonix 40 mg oral daily. 9) Anxiety: Patient reports taking Venlafaxine 150 mg oral daily, Ambien 10 mg oral nightly, Ativan 1 mg four times daily (Prescribed TID), and Amitriptyline 150 mg oral nightly.   -Continue holding all except Ativan TID due to polypharmacy/encephalopathy     10) Polysubstance abuse: Reported by significant other. Patient has been using increased dosage of Ativan, sleeping pills, and alcohol use.   -Addiction services and social work consulted       Expected discharge date:  TBD    Disposition:    [x] Home vs       [] TCU       [x] Rehab? [] Psych       [] SNF       [] [place]       [] Other-    Chief Complaint: AMS    Hospital Course:   From admitting HPI \"81 y.o. female with PMH HTN, HLD, COPD, Hypothyroidism, GERD, seizures, anxiety and polysubstance abuse who presented to  November for altered mental status. The patient is brought by significant other. He reports that the patient displayed confusion prior to evening dinner. During dinner she had 2 drinks and demonstrated slurred/garbled speech followed by falling x4. He states he had lost coordination and was weak. At this point EMS was called. He endorses that the patient has been taking more Ativan, pain pills and sleeping pills. Additionally she drinks on average of 2 tall beer cans, if not more per day. Patient has a history of alcohol withdrawal, rehab admission and seizures. No DTs. The ED, vitals Tmax 97.5, RR 20, , /71, SPO2 97% on RA. Labs significant for K3.4, CO2 20, AG 17, glucose 240. Troponin WNL. EKG sinus tachycardia with , QTc 504. UDS negative. Serum EtOH < 0.01.  CT head with no acute intracranial findings.   CTA head/neck with no high-grade cervical ICA or intracranial artery stenosis. \"     Subjective (past 24 hours):   Patient seen and examined at bedside. She is alert and oriented x4. She states she is feeing better this morning. However patient is jittery and anxious. States she does not drink too much and takes Ativan 1 mg four times daily for anxiety. She has also been on Ambien for the last year. PCP has been managing these medications. She denies having any visual or auditory hallucination, no tremors, nausea, or vomiting,. Will consult psychiatry. ROS (12 point review of systems completed. Pertinent positives noted. Otherwise ROS is negative). Medications:  Reviewed    Infusion Medications    dextrose      sodium chloride      sodium chloride 75 mL/hr at 02/17/23 0448     Scheduled Medications    atorvastatin  40 mg Oral QPM    folic acid  1 mg Oral Daily    multivitamin  1 tablet Oral Daily    pantoprazole  40 mg Oral QAM AC    thiamine  100 mg Oral Daily    insulin lispro  0-8 Units SubCUTAneous TID WC    insulin lispro  0-4 Units SubCUTAneous Nightly    sodium chloride flush  5-40 mL IntraVENous 2 times per day    enoxaparin  40 mg SubCUTAneous Daily    levothyroxine  100 mcg Oral Daily    LORazepam  1 mg Oral TID    [START ON 2/18/2023] lisinopril  20 mg Oral Daily     PRN Meds: glucose, dextrose bolus **OR** dextrose bolus, glucagon (rDNA), dextrose, sodium chloride flush, sodium chloride, [Held by provider] ondansetron **OR** [Held by provider] ondansetron, polyethylene glycol, acetaminophen **OR** acetaminophen, LORazepam      Intake/Output Summary (Last 24 hours) at 2/17/2023 1500  Last data filed at 2/17/2023 0831  Gross per 24 hour   Intake 452.86 ml   Output --   Net 452.86 ml       Diet:  ADULT DIET;  Regular    Exam:  BP (!) 154/59   Pulse (!) 103   Temp 97.7 °F (36.5 °C) (Oral)   Resp 18   Ht 5' 5\" (1.651 m)   Wt 148 lb (67.1 kg)   LMP  (LMP Unknown)   SpO2 96%   BMI 24.63 kg/m²     General appearance: No apparent distress, appears stated age and cooperative. HEENT: Pupils equal, round, and reactive to light. Conjunctivae/corneas clear. Neck: Supple, with full range of motion. No jugular venous distention. Trachea midline. Respiratory:  Normal respiratory effort. Clear to auscultation, bilaterally without Rales/Wheezes/Rhonchi. Cardiovascular: Regular rate and rhythm with normal S1/S2 without murmurs, rubs or gallops. Abdomen: Soft, non-tender, non-distended with normal bowel sounds. Musculoskeletal: passive and active ROM x 4 extremities. Skin: Skin color, texture, turgor normal.  No rashes or lesions. Neurologic:  Neurovascularly intact without any focal sensory/motor deficits. Cranial nerves: II-XII intact, grossly non-focal.  Psychiatric: Alert and oriented, Anxious, jittery   Capillary Refill: Brisk,< 3 seconds   Peripheral Pulses: +2 palpable, equal bilaterally       Labs:   Recent Labs     02/16/23  1900 02/17/23  0353   WBC 6.9 7.2   HGB 13.2 11.9*   HCT 39.5 37.9    189     Recent Labs     02/16/23  1900 02/17/23  0353    140   K 3.4* 3.9    105   CO2 20* 24   BUN 12 12   CREATININE 0.7 0.6   CALCIUM 9.2 8.7   PHOS  --  3.5     Recent Labs     02/16/23  1900   AST 29   ALT 43   BILITOT 0.2*   ALKPHOS 118     Recent Labs     02/16/23  1914   INR 0.93     No results for input(s): Minor Whalen in the last 72 hours. Microbiology:      Urinalysis:      Lab Results   Component Value Date/Time    NITRU NEGATIVE 06/26/2021 01:26 AM    WBCUA 15-25 06/26/2021 01:26 AM    BACTERIA NONE SEEN 06/26/2021 01:26 AM    RBCUA 0-2 06/26/2021 01:26 AM    BLOODU NEGATIVE 06/26/2021 01:26 AM    SPECGRAV <1.005 06/13/2016 07:10 PM    GLUCOSEU NEGATIVE 06/26/2021 01:26 AM       Radiology:  CT HEAD WO CONTRAST   Final Result   1.  No acute intracranial findings      This document has been electronically signed by: Patsy Kunz DO    on 02/16/2023 08:48 PM      All CTs at this facility use dose modulation techniques and iterative    reconstructions, and/or weight-based dosing   when appropriate to reduce radiation to a low as reasonably achievable. CTA HEAD W WO CONTRAST   Final Result   No high-grade intracranial artery stenosis. This document has been electronically signed by: Marnie Truong DO    on 02/16/2023 08:51 PM      All CTs at this facility use dose modulation techniques and iterative    reconstructions, and/or weight-based dosing   when appropriate to reduce radiation to a low as reasonably achievable. 3D Post-processing was performed on this study. CTA NECK W WO CONTRAST   Final Result   No high-grade cervical ICA stenosis. This document has been electronically signed by: Marnie Truong DO    on 02/16/2023 09:03 PM      All CTs at this facility use dose modulation techniques and iterative    reconstructions, and/or weight-based dosing   when appropriate to reduce radiation to a low as reasonably achievable. Carotid stenosis and measurements are in accordance with NASCET criteria. 3D Post-processing was performed on this study.           DVT prophylaxis: [x] Lovenox                                 [] SCDs                                 [] SQ Heparin                                 [] Encourage ambulation           [] Already on Anticoagulation     Code Status: Full Code    PT/OT Eval Status: N/A    Tele:   [x] yes             [] no    Electronically signed by Gerardo Franklin DO on 2/17/2023 at 3:00 PM

## 2023-02-17 NOTE — PLAN OF CARE
Problem: Discharge Planning  Goal: Discharge to home or other facility with appropriate resources  2/17/2023 1641 by Diane De Los Santos RN  Outcome: Progressing  2/17/2023 1015 by Diane De Los Santos RN  Outcome: Progressing  Flowsheets (Taken 2/17/2023 0845)  Discharge to home or other facility with appropriate resources: Identify barriers to discharge with patient and caregiver     Problem: Safety - Adult  Goal: Free from fall injury  2/17/2023 1641 by Diane De Los Santos RN  Outcome: Progressing  2/17/2023 1015 by Diane De Los Santos RN  Outcome: Progressing     Problem: ABCDS Injury Assessment  Goal: Absence of physical injury  2/17/2023 1641 by Diane De Los Santos RN  Outcome: Progressing  2/17/2023 1015 by Diane De Los Santos RN  Outcome: Progressing     Problem: Neurosensory - Adult  Goal: Achieves stable or improved neurological status  2/17/2023 1641 by Diane De Los Santos RN  Outcome: Progressing  2/17/2023 1015 by Diane De Los Santos RN  Outcome: Progressing  Flowsheets (Taken 2/17/2023 0845)  Achieves stable or improved neurological status: Assess for and report changes in neurological status     Problem: Metabolic/Fluid and Electrolytes - Adult  Description: Withdrawal  Goal: Electrolytes maintained within normal limits  2/17/2023 1641 by Diane De Los Santos RN  Outcome: Progressing  2/17/2023 1015 by Diane De Los Santos RN  Outcome: Progressing  Flowsheets (Taken 2/17/2023 0845)  Electrolytes maintained within normal limits: Monitor labs and assess patient for signs and symptoms of electrolyte imbalances

## 2023-02-17 NOTE — ED NOTES
Patient updated on plan of care at this time Patient resting in bed. Respirations easy and unlabored. No distress noted. Call light within reach.       Adrienne Galicia RN  02/16/23 8859

## 2023-02-17 NOTE — ED NOTES
ED to inpatient nurses report    Chief Complaint   Patient presents with    Fall    Aphasia    Fatigue      Present to ED from home  LOC: alert and orientated to name and place  Vital signs   Vitals:    02/16/23 1848 02/16/23 1946 02/16/23 2048 02/16/23 2153   BP: 138/71 (!) 146/87  (!) 142/78   Pulse: (!) 115 (!) 101 98 99   Resp: 20 16 20 20   Temp: 97.5 °F (36.4 °C)      TempSrc: Oral      SpO2: 97% 93% 97% 98%   Weight: 139 lb (63 kg)         Oxygen Baseline room air    Current needs required none Bipap/Cpap No  LDAs:   Peripheral IV 02/16/23 Left Wrist (Active)   Site Assessment Clean, dry & intact 02/16/23 1904   Line Status Blood return noted; Flushed;Normal saline locked 02/16/23 1904   Phlebitis Assessment No symptoms 02/16/23 1904   Infiltration Assessment 0 02/16/23 1904   Dressing Status Clean, dry & intact 02/16/23 1904   Dressing Intervention New 02/16/23 1904       Peripheral IV 02/16/23 Right Antecubital (Active)   Site Assessment Clean, dry & intact 02/16/23 1904   Line Status Blood return noted; Flushed;Normal saline locked 02/16/23 1904   Phlebitis Assessment No symptoms 02/16/23 1904   Infiltration Assessment 0 02/16/23 1904   Dressing Status Clean, dry & intact 02/16/23 1904   Dressing Intervention New 02/16/23 1904     Mobility: Requires assistance * 2  Pending ED orders: none  Present condition: stable      C-SSRS Risk of Suicide: No Risk  Swallow Screening  fail    Preferred Language: [place]     Electronically signed by Shawna Rogers RN on 2/16/2023 at 11:39 PM      Shawna Rogers RN  02/16/23 3925

## 2023-02-17 NOTE — H&P
History & Physical       Patient: Campbell Kwong  YOB: 1955    MRN: [id number]     Acct: [de-identified]    PCP: Sharla Dandy    Date of Admission: 2/16/2023    Date of Service: Patient seen / examined on 02/16/23 and admitted to Inpatient with expected LOS less than two midnights due to medical therapy. ASSESSMENT / PLAN:  Acute metabolic encephalopathy, improving: Likely multifactorial in setting of polypharmacy and EtOH intake. Check TSH. Electrolytes WNL. Medications reviewed, concern for polypharmacy. Troponin WNL, EKG without ischemic changes. No A-fib on telemetry. UDS negative. UA ordered. CT head, CTA head and neck nonsignificant. Concern for bzd withdrawal, takes daily ativan 1 mg TID. Serum EtOH <0.01, significant history of alcohol intake, see below. Started on phenobarb protocol. EtOH abuse/withdrawl: Per chart review and significant other. Takes 2 tall beers daily if not more. Last drink 2/16/2023. Serum EtOH <0.01. In conjunction with numerous anti anxiolytics, see below. History of withdrawal seizures, no DTs. Prior rehabilitation for EtOH and benzos. PAWS score 5. Denies visual or auditory hallucinations, see. Tachycardia, hypertension, itching present. Started on phenobarb treatment. Continue folic acid, multivitamin and thiamine. Progress diet per swallow evaluation. Addiction services consulted. If withdrawal seizures, start prn Ativan for concerns of ativan withdrawal.   Prolonged Qtc: Noted on EKG, QTc 504. Magnesium level pending. Given magnesium sulfate 2 g x 1. Repeat EKG in AM.  HTN: Continue amlodipine 5 mg p.o. daily and lisinopril 10 mg p.o. daily with holding parameters  HLD: Continue Lipitor 40 mg p.o. at bedtime  DMII: No HgbA1c on file   Home antidiabetic regimen includes metformin. Start medium-dose insulin sliding scale and Accu-Cheks. Monitor blood glucose with daily BMP. Adjust insulin accordingly.   Hypoglycemic protocols initiated. Check HgbA1c. Hypothyroidism: per chart review. Last TSH 0.456 11/2022. Continue Synthroid 50 mcg p.o. daily. Check TSH/FT4.  GERD: Continue pantoprazole 40 mg p.o. daily  History of seizures: Associated with withdrawal.  Anxiety: Takes venlafaxine 150 mg p.o. daily, Ambien 10 mg p.o. nightly, Ativan 1 mg p.o. 3 times daily and amitriptyline 150 mg p.o. nightly. Will hold in setting of AMS and polypharmacy. Polysubstance abuse: Reported by significant other increased use of Ativan, sleeping pills in concurrence with EtOH. Social work consulted. Chief Complaint:  AMS    History of Present Illness:  74 y.o. female with PMH HTN, HLD, COPD, Hypothyroidism, GERD, seizures, anxiety and polysubstance abuse who presented to [address] [place] [address] for altered mental status. The patient is brought by significant other. He reports that the patient displayed confusion prior to evening dinner. During dinner she had 2 drinks and demonstrated slurred/garbled speech followed by falling x4. He states he had lost coordination and was weak. At this point EMS was called. He endorses that the patient has been taking more Ativan, pain pills and sleeping pills. Additionally she drinks on average of 2 tall beer cans, if not more per day. Patient has a history of alcohol withdrawal, rehab admission and seizures. No DTs. The ED, vitals Tmax 97.5, RR 20, , /71, SPO2 97% on RA. Labs significant for K3.4, CO2 20, AG 17, glucose 240. Troponin WNL. EKG sinus tachycardia with , QTc 504. UDS negative. Serum EtOH < 0.01.  CT head with no acute intracranial findings. CTA head/neck with no high-grade cervical ICA or intracranial artery stenosis.       Past Medical History:    Past Medical History:   Diagnosis Date    Anxiety     COPD (chronic obstructive pulmonary disease) (HCC)     GERD (gastroesophageal reflux disease)     Hyperlipidemia     Hypertension     Psychiatric problem Seizures ([facility] [address]     Thyroid disease      Past Surgical History:    Past Surgical History:   Procedure Laterality Date    ABDOMINAL SURGERY      csection x 2    COLONOSCOPY      EYE SURGERY  2010 approx    cataract removal    INOCENCIA STEROTACTIC LOC BREAST BIOPSY LEFT Left 2012    neg    INOCENCIA STEROTACTIC LOC BREAST BIOPSY RIGHT Right 2019    neg      Medications Prior to Admission:   No current facility-administered medications on file prior to encounter. Current Outpatient Medications on File Prior to Encounter   Medication Sig Dispense Refill    amLODIPine (NORVASC) 5 MG tablet Take 1 tablet by mouth daily Hold for SBP less than 100 mmHg 30 tablet 3    Multiple Vitamin (MULTIVITAMIN) TABS tablet Take 1 tablet by mouth daily 30 tablet 0    vitamin B-1 (THIAMINE) 100 MG tablet Take 1 tablet by mouth daily 30 tablet 3    folic acid (FOLVITE) 1 MG tablet Take 1 tablet by mouth daily 30 tablet 0    metFORMIN (GLUCOPHAGE-XR) 750 MG extended release tablet Take 750 mg by mouth Daily with supper      amitriptyline (ELAVIL) 150 MG tablet Take 150 mg by mouth nightly      venlafaxine (EFFEXOR XR) 150 MG extended release capsule Take 150 mg by mouth daily      cetirizine (ZYRTEC) 10 MG tablet Take 10 mg by mouth daily      zolpidem (AMBIEN) 10 MG tablet Take 10 mg by mouth nightly. alendronate (FOSAMAX) 70 MG tablet Take 70 mg by mouth every 7 days      LORazepam (ATIVAN) 1 MG tablet Take 1 mg by mouth 3 times daily.        atorvastatin (LIPITOR) 40 MG tablet Take 1 tablet by mouth every evening 30 tablet 0    lisinopril (PRINIVIL;ZESTRIL) 10 MG tablet Take 10 mg by mouth daily       levothyroxine (SYNTHROID) 50 MCG tablet Take 1 tablet by mouth Daily (Patient taking differently: Take 100 mcg by mouth Daily ) 30 tablet 3    omeprazole (PRILOSEC) 20 MG capsule Take 20 mg by mouth 2 times daily as needed       fluticasone (FLONASE) 50 MCG/ACT nasal spray 2 sprays by Nasal route daily Indications: Use two sprays in each nostril once daily       Allergies:   Penicillins    Social History:   Social History     Socioeconomic History    Marital status:      Spouse name: Not on file    Number of children: 2    Years of education: Not on file    Highest education level: Not on file   Occupational History    Not on file   Tobacco Use    Smoking status: Former     Packs/day: 0.50     Years: 3.00     Pack years: 1.50     Types: Cigarettes    Smokeless tobacco: Never   Vaping Use    Vaping Use: Never used   Substance and Sexual Activity    Alcohol use: Yes     Alcohol/week: 2.0 standard drinks     Types: 2 Cans of beer per week     Comment: social drinker    Drug use: No    Sexual activity: Not Currently   Other Topics Concern    Not on file   Social History Narrative    Not on file     Social Determinants of Health     Financial Resource Strain: Not on file   Food Insecurity: Not on file   Transportation Needs: Not on file   Physical Activity: Not on file   Stress: Not on file   Social Connections: Not on file   Intimate Partner Violence: Not on file   Housing Stability: Not on file     Family History:    Family History   Problem Relation Age of Onset    Heart Disease Mother     High Blood Pressure Mother     High Cholesterol Mother     Esophageal Cancer Mother 47        mets to the breast    Kidney Disease Father     Breast Cancer Neg Hx     Ovarian Cancer Neg Hx      REVIEW OF SYSTEMS:  A 14-point ROS was obtained and negative, with the exception of pertinent positives as listed below:    Reports a headache. PHYSICAL EXAM:  Vitals:    02/16/23 1848 02/16/23 1946 02/16/23 2048 02/16/23 2153   BP: 138/71 (!) 146/87  (!) 142/78   Pulse: (!) 115 (!) 101 98 99   Resp: 20 16 20 20   Temp: 97.5 °F (36.4 °C)      TempSrc: Oral      SpO2: 97% 93% 97% 98%   Weight: 139 lb (63 kg)        General appearance: Alert / well-appearing. Interactive around cooperative. NAD. HEENT:  Normocephalic / atraumatic. PERRL. EOM intact.  Conjunctivae appear normal.  Neck: Supple. No JVD. Respiratory: Normal respiratory effort on RA. CTAB. No wheezes / rales / rhonchi. Cardiovascular: RRR. Normal S1/S2. No murmurs / rubs / gallops. Abdomen: Soft / non-tender / non-distended. BS present. Musculoskeletal: No cyanosis or edema. Skin: Warm / dry. Normal turgor. Neurologic: A/O x 3. Speech normal. Answers questions appropriately. CN intact. Romberg/pronator drift/gait not assessed. Finger-to-nose with minimal dysmetria. Psychiatric: Thought content / judgment / insight appear appropriate. Capillary refill: Brisk bilaterally. Peripheral pulses: +2 bilaterally.     Labs:   Results for orders placed or performed during the hospital encounter of 02/16/23   Ethanol   Result Value Ref Range    ETHYL ALCOHOL, SERUM < 0.01 0.00 %   Urine Drug Screen   Result Value Ref Range    Amphetamine+Methamphetamine Urine Screen Negative NEGATIVE    Barbiturate Quant, Ur Negative NEGATIVE    Benzodiazepine Quant, Ur Negative NEGATIVE    Cannabinoid Quant, Ur Negative NEGATIVE    Cocaine Metab Quant, Ur Negative NEGATIVE    Opiates, Urine Negative NEGATIVE    Oxycodone Negative NEGATIVE    PCP Quant, Ur Negative NEGATIVE    Fentanyl Negative NEGATIVE   CBC with Auto Differential   Result Value Ref Range    WBC 6.9 4.8 - 10.8 thou/mm3    RBC 4.47 4.20 - 5.40 mill/mm3    Hemoglobin 13.2 12.0 - 16.0 gm/dl    Hematocrit 39.5 37.0 - 47.0 %    MCV 88.4 81.0 - 99.0 fL    MCH 29.5 26.0 - 33.0 pg    MCHC 33.4 32.2 - 35.5 gm/dl    RDW-CV 13.1 11.5 - 14.5 %    RDW-SD 42.5 35.0 - 45.0 fL    Platelets 364 668 - 780 thou/mm3    MPV 9.4 9.4 - 12.4 fL    Seg Neutrophils 62.3 %    Lymphocytes 28.5 %    Monocytes 5.7 %    Eosinophils 2.2 %    Basophils 0.7 %    Immature Granulocytes 0.6 %    Segs Absolute 4.3 1.8 - 7.7 thou/mm3    Lymphocytes Absolute 2.0 1.0 - 4.8 thou/mm3    Monocytes Absolute 0.4 0.4 - 1.3 thou/mm3    Eosinophils Absolute 0.2 0.0 - 0.4 thou/mm3    Basophils Absolute 0.0 0.0 - 0.1 thou/mm3    Immature Grans (Abs) 0.04 0.00 - 0.07 thou/mm3    nRBC 0 /100 wbc   CMP   Result Value Ref Range    Glucose 240 (H) 70 - 108 mg/dL    Creatinine 0.7 0.4 - 1.2 mg/dL    BUN 12 7 - 22 mg/dL    Sodium 138 135 - 145 meq/L    Potassium 3.4 (L) 3.5 - 5.2 meq/L    Chloride 101 98 - 111 meq/L    CO2 20 (L) 23 - 33 meq/L    Calcium 9.2 8.5 - 10.5 mg/dL    AST 29 5 - 40 U/L    Alkaline Phosphatase 118 38 - 126 U/L    Total Protein 6.9 6.1 - 8.0 g/dL    Albumin 3.9 3.5 - 5.1 g/dL    Total Bilirubin 0.2 (L) 0.3 - 1.2 mg/dL    ALT 43 11 - 66 U/L   Protime-INR   Result Value Ref Range    INR 0.93 0.85 - 1.13   Troponin   Result Value Ref Range    Troponin T < 0.010 ng/ml   Anion Gap   Result Value Ref Range    Anion Gap 17.0 (H) 8.0 - 16.0 meq/L   Osmolality   Result Value Ref Range    Osmolality Calc 283.3 275.0 - 300.0 mOsmol/kg   Glomerular Filtration Rate, Estimated   Result Value Ref Range    Est, Glom Filt Rate >60 >60 ml/min/1.73m2   POCT Glucose   Result Value Ref Range    Glucose 266 mg/dL   POCT Glucose   Result Value Ref Range    POC Glucose 266 (H) 70 - 108 mg/dl   EKG 12 Lead   Result Value Ref Range    Ventricular Rate 114 BPM    Atrial Rate 114 BPM    P-R Interval 178 ms    QRS Duration 116 ms    Q-T Interval 366 ms    QTc Calculation (Bazett) 504 ms    P Axis 34 degrees    R Axis 4 degrees    T Axis 39 degrees       EKG / Radiology:     EKG:  Reviewed by me --    CXR:   Reviewed by me --    CTA HEAD W WO CONTRAST    Result Date: 2/16/2023  CT angiography head with contrast. 3D Postprocessing. Comparison: CT/SR - CT HEAD WO CONTRAST - 02/16/2023 08:16 PM EST Findings: Intracranial arteries are patent. No aneurysm, dissection, or occlusion. No abnormal intracranial enhancement. No intracranial mass, midline shift, hydrocephalus, abnormal contrast enhancement, or acute hemorrhage. No skull fracture. No high-grade intracranial artery stenosis.  This document has been electronically signed by: Juan Vaughan DO on 02/16/2023 08:51 PM All CTs at this facility use dose modulation techniques and iterative reconstructions, and/or weight-based dosing when appropriate to reduce radiation to a low as reasonably achievable. 3D Post-processing was performed on this study. CT HEAD WO CONTRAST    Result Date: 2/16/2023  CT head without contrast Comparison: CT - CT HEAD WO CONTR - 08/22/2016 06:39 PM EDT Findings: No intra-axial mass, midline shift, hydrocephalus, or acute hemorrhage. Mild cerebral atrophy. Left posterior septal deviation with a left septal spur. The visualized paranasal sinuses and mastoid air cells are normal. The orbits are unremarkable. There is no acute fracture. 1. No acute intracranial findings This document has been electronically signed by: Juan Vaughan DO on 02/16/2023 08:48 PM All CTs at this facility use dose modulation techniques and iterative reconstructions, and/or weight-based dosing when appropriate to reduce radiation to a low as reasonably achievable. CTA NECK W WO CONTRAST    Result Date: 2/16/2023  CT angiography neck with contrast. 3D Postprocessing. Comparison: CT/SR - CTA HEAD W WO CONTRAST - 02/16/2023 08:16 PM EST Findings: Aortic arch and cervical great vessels are patent. Visualized intracranial arteries are patent. No aneurysm, dissection, or occlusion. Thyroid gland unremarkable. No cervical mass or fluid collection. Normal carotid bifurcation. No significant carotid atherosclerosis. Patent bilateral cervical internal carotid arteries. Mild bilateral cavernous ICA vascular calcifications. Mild bilateral upper lobe peripherally interstitial changes. No acute fracture. No high-grade cervical ICA stenosis.  This document has been electronically signed by: Juan Vaughan DO on 02/16/2023 09:03 PM All CTs at this facility use dose modulation techniques and iterative reconstructions, and/or weight-based dosing when appropriate to reduce radiation to a low as reasonably achievable. Carotid stenosis and measurements are in accordance with NASCET criteria. 3D Post-processing was performed on this study. FEN/GI/DVT:  IVF: NS @ 75 mL/hr  Electrolytes: Monitor and replace per protocols  Diet: General  GI PPX: Yes  DVT Prophylaxis: Lovenox    CODE STATUS:  Full    Thank you Radha Henry for the opportunity to be involved in this patient's care.     Electronically signed by Katie Ricks DO on 2/16/2023 at 10:42 PM

## 2023-02-17 NOTE — ED TRIAGE NOTES
Patient presents to the ED via [facility] from home for concerns of multiple falls. Boyfriend reports that patient has been falling a lot more recently and fell today after leaving a restaurant after dinner. Boyfriend to bedside reports that she had 2 alcoholic drinks with dinner and her speech became more slurred after falling in the parking lot and then fell three more time before calling EMS. Boyfriend also reports that patient may have taken more ativan, pain pills and sleeping pills then she should. VSS.

## 2023-02-17 NOTE — ED NOTES
Urine sample sent to lab at this time. Patient resting in bed. Respirations easy and unlabored. No distress noted. Call light within reach. Boyfriend at the bedside.       Cris Crisostomo RN  02/16/23 1956

## 2023-02-17 NOTE — PROGRESS NOTES
Physician Progress Note      PATIENT:               Maddie Ferrell  CSN #:                  [id number]  :                       1955  ADMIT DATE:       2023 6:36 PM  [address] [place] DATE:  RESPONDING  PROVIDER #:        Kirsty Zhou          QUERY TEXT:    Dr Blackwell, Dr Nickolas García,    Patient admitted with EtOH abuse/withdrawal, noted to have taken all 4 doses   of 1 mg Ativan at one time with 2 alcoholic drinks & Sleeping pills. If   possible, please document in progress notes and discharge summary if you are   evaluating and/or treating any of the following: The medical record reflects the following:  Risk Factors: COPD, Seizures, HTN  Clinical Indicators: noted to have taken all 4 doses of 1 mg Ativan at one   time with 2 alcoholic drinks & Sleeping pills. Treatment: phenobarb protocol. Folic acid, multivitamin and thiamine. Options provided:  -- Accidental overdose of Ativan, not taken as prescribed-POA. -- Adverse effect of Ativan, properly administered  -- Other - I will add my own diagnosis  -- Disagree - Not applicable / Not valid  -- Disagree - Clinically unable to determine / Unknown  -- Refer to Clinical Documentation Reviewer    PROVIDER RESPONSE TEXT:    This patient had an accidental overdose of Ativan, not taken as   prescribed-POA. Query created by: Kayleigh Rapp on 2023 7:21 AM      QUERY TEXT:    Dr Blackwell, Dr Nickolas García,    Patient admitted with EtOH abuse/withdrawal, noted to have taken all 4 doses   of 1 mg Ativan at one time with 2 alcoholic drinks & Sleeping pills and has   metabolic encephalopathy in setting of polypharmacy and EtOH intake   documented.  If possible, please document in progress notes and discharge   summary further specificity regarding the type of encephalopathy:    The medical record reflects the following:  Risk Factors:  EtOH abuse/withdrawal, noted to have taken all 4 doses of 1 mg   Ativan at one time with 2 alcoholic drinks & Sleeping pills  Clinical Indicators: Per HP: Acute metabolic encephalopathy, improving: Likely   multifactorial in setting of polypharmacy and EtOH intake. Treatment: phenobarb protocol. Folic acid, multivitamin and thiamine. Options provided:  -- Toxic encephalopathy  -- Drug-induced encephalopathy due to polypharmacy and EtOH intake. -- Metabolic encephalopathy  -- Toxic metabolic encephalopathy  -- Other - I will add my own diagnosis  -- Disagree - Not applicable / Not valid  -- Disagree - Clinically unable to determine / Unknown  -- Refer to Clinical Documentation Reviewer    PROVIDER RESPONSE TEXT:    This patient has drug-induced encephalopathy due to polypharmacy and EtOH   intake.     Query created by: Jean-Pierre Zuniga on 2/17/2023 7:25 AM      Electronically signed by:  Corina Hopkins 2/17/2023 2:34 PM

## 2023-02-17 NOTE — ED PROVIDER NOTES
[facility] [address] [address] EMERGENCY DEPT    EMERGENCY MEDICINE      Pt Name: Irma Rivera  MRN: [id number]  [place] 1955  Date of evaluation: 2/16/2023  Treating Resident Physician: Hollie Borrero MD  Supervising Physician: Simona Wong MD    [address] [place] [address]       Chief Complaint   Patient presents with    Fall    Aphasia    Fatigue     History obtained from chart review and the patient and the patient's . HISTORY OF PRESENT ILLNESS    HPI    Irma Rivera is a 74 y.o. female with PMH of benzodiazepine dependence, bipolar disorder, seizures presents to the emergency department for evaluation of altered mental status. Patient's  stated that when he got home from work this morning the patient was normal.  He then slept and when he woke up at 1300 the patient was confused as to the day. He stated that then patient was less confused and when they went out for dinner after the patient came back from the bathroom she had had multiple falls and was disoriented to time and place. Stated that she fell forward while walking to the car and due to her disorientation he brought her to the ED. He admitted that the patient has a history of taking all of her daily Ativan at one time. He stated that she takes four 1 mg doses throughout the day but he gives them all to her at 1 time and she takes all of them at once. He admits that she has history of benzodiazepine dependence and she has had seizures in the past when she would withdraw. Patient is disoriented but answering all questions appropriately, no focal deficits were noted but patient does have slurred speech and poor balance. Patient denying any chest pain, shortness of breath, nausea, vomiting, fever. The patient has no other acute complaints at this time.     PAST MEDICAL AND SURGICAL HISTORY     Past Medical History:   Diagnosis Date    Anxiety     COPD (chronic obstructive pulmonary disease) (HCC)     GERD (gastroesophageal reflux disease) Hyperlipidemia     Hypertension     Psychiatric problem     Seizures ([facility] [address]     Thyroid disease        Past Surgical History:   Procedure Laterality Date    ABDOMEN SURGERY      csection x 2    COLONOSCOPY      EYE SURGERY  2010 approx    cataract removal    INOCENCIA STEROTACTIC LOC BREAST BIOPSY LEFT Left 2012    neg    INOCENCIA STEROTACTIC LOC BREAST BIOPSY RIGHT Right 2019    neg       CURRENT MEDICATIONS     Previous Medications    ALENDRONATE (FOSAMAX) 70 MG TABLET    Take 70 mg by mouth every 7 days    AMITRIPTYLINE (ELAVIL) 150 MG TABLET    Take 150 mg by mouth nightly    AMLODIPINE (NORVASC) 5 MG TABLET    Take 1 tablet by mouth daily Hold for SBP less than 100 mmHg    ATORVASTATIN (LIPITOR) 40 MG TABLET    Take 1 tablet by mouth every evening    CETIRIZINE (ZYRTEC) 10 MG TABLET    Take 10 mg by mouth daily    FLUTICASONE (FLONASE) 50 MCG/ACT NASAL SPRAY    2 sprays by Nasal route daily Indications: Use two sprays in each nostril once daily    FOLIC ACID (FOLVITE) 1 MG TABLET    Take 1 tablet by mouth daily    LEVOTHYROXINE (SYNTHROID) 50 MCG TABLET    Take 1 tablet by mouth Daily    LISINOPRIL (PRINIVIL;ZESTRIL) 10 MG TABLET    Take 10 mg by mouth daily     LORAZEPAM (ATIVAN) 1 MG TABLET    Take 1 mg by mouth 3 times daily. METFORMIN (GLUCOPHAGE-XR) 750 MG EXTENDED RELEASE TABLET    Take 750 mg by mouth Daily with supper    MULTIPLE VITAMIN (MULTIVITAMIN) TABS TABLET    Take 1 tablet by mouth daily    OMEPRAZOLE (PRILOSEC) 20 MG CAPSULE    Take 20 mg by mouth 2 times daily as needed     VENLAFAXINE (EFFEXOR XR) 150 MG EXTENDED RELEASE CAPSULE    Take 150 mg by mouth daily    VITAMIN B-1 (THIAMINE) 100 MG TABLET    Take 1 tablet by mouth daily    ZOLPIDEM (AMBIEN) 10 MG TABLET    Take 10 mg by mouth nightly.        ALLERGIES     Allergies   Allergen Reactions    Penicillins Rash       FAMILY HISTORY     Family History   Problem Relation Age of Onset    Heart Disease Mother     High Blood Pressure Mother     High Cholesterol Mother     Esophageal Cancer Mother 47        mets to the breast    Kidney Disease Father     Breast Cancer Neg Hx     Ovarian Cancer Neg Hx        SOCIAL HISTORY     Social History     Tobacco Use    Smoking status: Former     Packs/day: 0.50     Years: 3.00     Pack years: 1.50     Types: Cigarettes    Smokeless tobacco: Never   Vaping Use    Vaping Use: Never used   Substance Use Topics    Alcohol use: Yes     Alcohol/week: 2.0 standard drinks     Types: 2 Cans of beer per week     Comment: social drinker    Drug use: Yes     Types: Benzodiazepines (Downers/Zannies)       PHYSICAL EXAM     ED Triage Vitals [02/16/23 1848]   BP Temp Temp Source Heart Rate Resp SpO2 Height Weight   138/71 97.5 °F (36.4 °C) Oral (!) 115 20 97 % -- 139 lb (63 kg)     Body mass index is 23.13 kg/m². Initial vital signs and nursing assessment reviewed and abnormal from tachycardia . Physical Exam  Vitals and nursing note reviewed. Constitutional:       General: She is not in acute distress. Appearance: Normal appearance. She is not ill-appearing, toxic-appearing or diaphoretic. HENT:      Head: Normocephalic and atraumatic. Right Ear: External ear normal.      Left Ear: External ear normal.      Nose: Nose normal. No congestion or rhinorrhea. Mouth/Throat:      Mouth: Mucous membranes are moist.      Pharynx: Oropharynx is clear. No oropharyngeal exudate or posterior oropharyngeal erythema. Eyes:      General: No scleral icterus. Right eye: No discharge. Left eye: No discharge. Extraocular Movements: Extraocular movements intact. Conjunctiva/sclera: Conjunctivae normal.      Pupils: Pupils are equal, round, and reactive to light. Cardiovascular:      Rate and Rhythm: Regular rhythm. Tachycardia present. Pulses: Normal pulses. Heart sounds: Normal heart sounds. No murmur heard. No gallop.    Pulmonary:      Effort: Pulmonary effort is normal. No respiratory distress. Breath sounds: Normal breath sounds. No stridor. No wheezing, rhonchi or rales. Abdominal:      General: Bowel sounds are normal. There is no distension. Palpations: Abdomen is soft. Tenderness: There is no abdominal tenderness. There is no right CVA tenderness, left CVA tenderness, guarding or rebound. Musculoskeletal:         General: No swelling, tenderness, deformity or signs of injury. Cervical back: Neck supple. No rigidity or tenderness. Right lower leg: No edema. Left lower leg: No edema. Lymphadenopathy:      Cervical: No cervical adenopathy. Skin:     General: Skin is warm and dry. Coloration: Skin is not jaundiced or pale. Findings: No bruising, erythema, lesion or rash. Neurological:      General: No focal deficit present. Mental Status: She is alert. She is disoriented. Coordination: Coordination abnormal.   Psychiatric:         Mood and Affect: Mood normal.         Behavior: Behavior normal.         Thought Content: Thought content normal.         Judgment: Judgment normal.        FORMAL DIAGNOSTIC RESULTS     RADIOLOGY: Interpretation per the Radiologist below, if available at the time of this note (none if blank):    CT HEAD WO CONTRAST   Final Result   1. No acute intracranial findings      This document has been electronically signed by: Jennifer Meza DO    on 02/16/2023 08:48 PM      All CTs at this facility use dose modulation techniques and iterative    reconstructions, and/or weight-based dosing   when appropriate to reduce radiation to a low as reasonably achievable. CTA HEAD W WO CONTRAST   Final Result   No high-grade intracranial artery stenosis.       This document has been electronically signed by: Jennifer Meza DO    on 02/16/2023 08:51 PM      All CTs at this facility use dose modulation techniques and iterative    reconstructions, and/or weight-based dosing   when appropriate to reduce radiation to a low as reasonably achievable. 3D Post-processing was performed on this study. CTA NECK W WO CONTRAST   Final Result   No high-grade cervical ICA stenosis. This document has been electronically signed by: Rajni Solorzano DO    on 02/16/2023 09:03 PM      All CTs at this facility use dose modulation techniques and iterative    reconstructions, and/or weight-based dosing   when appropriate to reduce radiation to a low as reasonably achievable. Carotid stenosis and measurements are in accordance with NASCET criteria. 3D Post-processing was performed on this study.           LABS: (none if blank)  Labs Reviewed   COMPREHENSIVE METABOLIC PANEL - Abnormal; Notable for the following components:       Result Value    Glucose 240 (*)     Potassium 3.4 (*)     CO2 20 (*)     Total Bilirubin 0.2 (*)     All other components within normal limits   ANION GAP - Abnormal; Notable for the following components:    Anion Gap 17.0 (*)     All other components within normal limits   POCT GLUCOSE - Abnormal; Notable for the following components:    POC Glucose 266 (*)     All other components within normal limits   POCT GLUCOSE - Normal   ETHANOL   URINE DRUG SCREEN   CBC WITH AUTO DIFFERENTIAL   PROTIME-INR   TROPONIN   OSMOLALITY   GLOMERULAR FILTRATION RATE, ESTIMATED       (Any cultures that may have been sent were not resulted at the time of this patient visit)    MEDICAL DECISION MAKING     1) Number and Complexity of Problems            Problem List This Visit:         Chief Complaint   Patient presents with    Fall    Aphasia    Fatigue          Differential Diagnosis includes (but not limited to):  Alcohol intoxication, benzodiazepine intoxication, polypharmacy, CVA, seizure        Diagnoses Considered with low index of suspicion:   Aortic dissection             Pertinent Comorbid Conditions:    Benzodiazepine abuse    2)  Data Reviewed (none if blank or additional interpretation can be found in ED Course)          My Independent interpretations:     EKG:      Sinus tach with QTc prolongation normal axis    Imaging: CT head negative for intracranial hemorrhages, masses, shift. CTA head and neck ordered also showing no high-grade arterial stenosis. Labs:      Labs showing hyperglycemia with very mild hypokalemia                 Decision Rules/Clinical Scores utilized:              External Documentation Reviewed:   Previous patient encounter documents & history available on EMR was reviewed as available. 3)  Treatment and Disposition         ED Reassessment: Patient did show some improvement in mental status throughout her stay but did not return to baseline mentation. Case discussed with consulting clinician: Discussed with hospitalist for admission         Shared Decision-Making was performed and disposition discussed with the        patient/caregiver at bedside with all questions answered. Shared decision making with patient and her  for admission and observation. Social determinants of health impacting treatment or disposition:           Code Status: Full code      Summary of Patient Presentation:      MDM  Number of Diagnoses or Management Options  Altered mental status, unspecified altered mental status type: new, needed workup  Diagnosis management comments: 72-year-old female coming to the ED for evaluation of altered mental status. Initially had high suspicion for polypharmacy or medication side effect. Due to patient taking large amounts of benzos at 1 time and drinking alcohol suspected possible cause of patient's symptoms. Urine tox showed negative for benzodiazepines but was other drugs and alcohol was negative despite patient admitting to drinking at least 2 beers, witnessed by her . Patient also admitted to taking her benzodiazepines this morning but stated she had only taken 2 mg so far today.   Patient denied taking any other medications or drugs.  Due to patient's history of abuse, altered mental status, history of seizures with withdrawals, and patient not returning to baseline mentation decision for admission was discussed with patient, her , and hospitalist.  All parties agreed to have patient admitted for observation. Patient was then admitted for continued evaluation and treatment. Amount and/or Complexity of Data Reviewed  Clinical lab tests: ordered and reviewed  Tests in the radiology section of CPT®: ordered and reviewed  Tests in the medicine section of CPT®: ordered and reviewed  Obtain history from someone other than the patient: yes  Discuss the patient with other providers: yes  Independent visualization of images, tracings, or specimens: yes    Patient Progress  Patient progress: improved  /   Vitals Reviewed:    Vitals:    02/16/23 1946 02/16/23 2048 02/16/23 2153 02/16/23 2340   BP: (!) 146/87  (!) 142/78 129/71   Pulse: (!) 101 98 99 (!) 101   Resp: 16 20 20 25   Temp:       TempSrc:       SpO2: 93% 97% 98% 99%   Weight:           The patient was seen and examined. Appropriate diagnostic testing was performed and results reviewed with the patient and/or caregivers. The results of pertinent diagnostic studies and exam findings were discussed. The patients provisional diagnosis and plan of care were discussed with the patient and present caregivers who expressed understanding. Any medications were reviewed and indications and risks of medications were discussed. Strict verbal and written return precautions, instructions and appropriate follow-up were provided to the patient.      ED Medications administered this visit:  (None if blank)  Medications   magnesium sulfate 2000 mg in water 50 mL IVPB (has no administration in time range)   0.9 % sodium chloride infusion (has no administration in time range)   iopamidol (ISOVUE-370) 76 % injection 80 mL (80 mLs IntraVENous Given 2/16/23 2014)       PROCEDURES: (None if blank)  Procedures:     CRITICAL CARE: (None if blank)    DISCHARGE PRESCRIPTIONS: (None if blank)  New Prescriptions    No medications on file         FINAL IMPRESSION     Final diagnoses: Altered mental status, unspecified altered mental status type     1. Altered mental status, unspecified altered mental status type        DISPOSITION / [id number] Admitted 02/16/2023 11:38:20 PM      OUTPATIENT FOLLOW UP THE PATIENT:  No follow-up provider specified. This transcription was electronically signed. Parts of this transcriptions may have been dictated by use of voice recognition software and electronically transcribed, and parts may have been transcribed with the assistance of an ED scribe. The transcription may contain errors not detected in proofreading. Please refer to my supervising physician's documentation if my documentation differs.     Electronically Signed: Jos Napier MD, 02/17/23, 12:28 AM        Jos Napier MD  Resident  02/17/23 0021

## 2023-02-17 NOTE — FLOWSHEET NOTE
02/17/23 1155   Safe Environment   Safety Measures Other (comment)  (Virtual Safety Check)   Patient semi fowlers in  bed with visitor at bedside. Denies needs/questions at this time. Call light within reach. No safety concerns at this time.

## 2023-02-17 NOTE — PROGRESS NOTES
Pt was in bed and alone at the time of the visit. She was dealing with alcohol dependence with withdrawal. He was very excited and wanted prayer to cope and heal. She was anointed.    02/17/23 1527   Encounter Summary   Encounter Overview/Reason  Initial Encounter   Service Provided For: Patient   Referral/Consult From: [facility] Family members   Last Encounter  02/17/23  (Anointed.)   Complexity of Encounter Moderate   Begin Time 0945   End Time  0956   Total Time Calculated 11 min   Spiritual/Emotional needs   Type Spiritual Support   Rituals, Rites and Sacraments   Type Anointing   Assessment/Intervention/Outcome   Assessment Calm   Intervention Empowerment

## 2023-02-17 NOTE — PLAN OF CARE
Problem: Discharge Planning  Goal: Discharge to home or other facility with appropriate resources  Outcome: Progressing  Flowsheets (Taken 2/17/2023 0845)  Discharge to home or other facility with appropriate resources: Identify barriers to discharge with patient and caregiver     Problem: Safety - Adult  Goal: Free from fall injury  Outcome: Progressing     Problem: ABCDS Injury Assessment  Goal: Absence of physical injury  Outcome: Progressing     Problem: Neurosensory - Adult  Goal: Achieves stable or improved neurological status  Outcome: Progressing  Flowsheets (Taken 2/17/2023 0845)  Achieves stable or improved neurological status: Assess for and report changes in neurological status

## 2023-02-17 NOTE — CARE COORDINATION
Case Management Assessment  Initial Evaluation    Date/Time of Evaluation: 2/17/2023 11:57 AM  Assessment Completed by: Chaney Olszewski, RN    If patient is discharged prior to next notation, then this note serves as note for discharge by case management. Patient Name: Jagdeep Gaming                   YOB: 1955  Diagnosis: Alcohol dependence with withdrawal ([facility] [address] [F10.239]  Altered mental status, unspecified altered mental status type [R41.82]                   Date / Time: 2/16/2023  6:36 PM  Location: [address] [place] [address]     Patient Admission Status: Inpatient   Readmission Risk Low 0-14, Mod 15-19), High > 20: Readmission Risk Score: 10.9  Current PCP: Eulogio Pemberton  PCP verified by CM? Yes    Chart Reviewed: Yes      History Provided by: Patient  Patient Orientation: Alert and Oriented    Patient Cognition: Alert    Hospitalization in the last 30 days (Readmission):  No    If yes, Readmission Assessment in CM Navigator will be completed. Advance Directives:      Code Status: Full Code   Patient's Primary Decision Maker is: Legal Next of Kin      Discharge Planning:    Patient lives with: Spouse/Significant Other Type of Home: House  Primary Care Giver: Self  Patient Support Systems include: Spouse/Significant Other   Current Financial resources: Medicare  Current community resources: None  Current services prior to admission: None            Current DME:              Type of Home Care services:  None    ADLS  Prior functional level: Independent in ADLs/IADLs  Current functional level: Independent in ADLs/IADLs    Family can provide assistance at DC: Yes  Would you like Case Management to discuss the discharge plan with any other family members/significant others, and if so, who?     Plans to Return to Present Housing: Yes  Other Identified Issues/Barriers to RETURNING to current housing: medical complications  Potential Assistance needed at discharge: N/A            Potential DME: Patient expects to discharge to: House  Plan for transportation at discharge: Self    Financial    Payor: Noemí Hunter / Plan: Celeste Mckinney / Product Type: *No Product type* /     Does insurance require precert for SNF: Yes    Potential assistance Purchasing Medications: No  Meds-to-Beds request: Yes      [address]  [address]  [address] [facility]  Phone: [phone] Fax: [phone]      Notes:    Factors facilitating achievement of predicted outcomes: Family support    Barriers to discharge: Medical complications    Additional Case Management Notes: From ED, Addiction Service consult, diabetes management, telemetry, IV fluids, Lovenox, Phenobarbital protocol, Lipitor. Procedure:   2/16 CT Head WO Contrast 1. No acute intracranial findings     2/16 CTA Head Neck W WO Contrast No high-grade cervical ICA stenosis. The Plan for Transition of Care is related to the following treatment goals of Alcohol dependence with withdrawal ([facility] [address] [F10.239]  Altered mental status, unspecified altered mental status type [R41.82]    Patient Goals/Plan/Treatment Preferences: Met with Monique. She currently lives at home with her sig other. She is independent. Plan is to return at discharge. She denies need for DME and declines HH. Will follow. Transportation/Food Security/Housekeeping Addressed: No issues identified.      Triny Smart RN  Case Management Department

## 2023-02-17 NOTE — ED NOTES
Patient resting in bed with eyes closed. Respirations easy and unlabored. No distress noted. Call light within reach. Bed alarm and posie alarm activated. Boyfriend remains at the bedside.       Antoinette Franco RN  02/16/23 0789

## 2023-02-17 NOTE — PROGRESS NOTES
VN completed admission questions at this time. Pt resting in bed with call light in reach. No voiced questions or concerns. Pt wasn't sure of the medications she takes at home and stated she would try to have someone bring in a list for her.

## 2023-02-18 VITALS
HEIGHT: 65 IN | WEIGHT: 147.71 LBS | DIASTOLIC BLOOD PRESSURE: 99 MMHG | TEMPERATURE: 98.1 F | RESPIRATION RATE: 16 BRPM | HEART RATE: 89 BPM | BODY MASS INDEX: 24.61 KG/M2 | SYSTOLIC BLOOD PRESSURE: 157 MMHG | OXYGEN SATURATION: 99 %

## 2023-02-18 LAB
ANION GAP SERPL CALC-SCNC: 10 MEQ/L (ref 8–16)
BACTERIA: NORMAL
BILIRUB UR QL STRIP: NEGATIVE
BUN SERPL-MCNC: 9 MG/DL (ref 7–22)
CALCIUM SERPL-MCNC: 8.6 MG/DL (ref 8.5–10.5)
CASTS #/AREA URNS LPF: NORMAL /LPF
CASTS #/AREA URNS LPF: NORMAL /LPF
CHARACTER UR: CLEAR
CHARCOAL URNS QL MICRO: NORMAL
CHLORIDE SERPL-SCNC: 109 MEQ/L (ref 98–111)
CO2 SERPL-SCNC: 24 MEQ/L (ref 23–33)
COLOR UR: YELLOW
CREAT SERPL-MCNC: 0.7 MG/DL (ref 0.4–1.2)
CRYSTALS URNS QL MICRO: NORMAL
DEPRECATED RDW RBC AUTO: 45.8 FL (ref 35–45)
EPITHELIAL CELLS, UA: NORMAL /HPF
ERYTHROCYTE [DISTWIDTH] IN BLOOD BY AUTOMATED COUNT: 13.4 % (ref 11.5–14.5)
GFR SERPL CREATININE-BSD FRML MDRD: > 60 ML/MIN/1.73M2
GLUCOSE BLD STRIP.AUTO-MCNC: 130 MG/DL (ref 70–108)
GLUCOSE BLD STRIP.AUTO-MCNC: 150 MG/DL (ref 70–108)
GLUCOSE BLD STRIP.AUTO-MCNC: 161 MG/DL (ref 70–108)
GLUCOSE SERPL-MCNC: 159 MG/DL (ref 70–108)
GLUCOSE UR QL STRIP.AUTO: NEGATIVE MG/DL
HCT VFR BLD AUTO: 36.4 % (ref 37–47)
HGB BLD-MCNC: 11.7 GM/DL (ref 12–16)
HGB UR QL STRIP.AUTO: NEGATIVE
KETONES UR QL STRIP.AUTO: NEGATIVE
LEUKOCYTE ESTERASE UR QL STRIP.AUTO: NEGATIVE
MAGNESIUM SERPL-MCNC: 1.5 MG/DL (ref 1.6–2.4)
MAGNESIUM SERPL-MCNC: 2.4 MG/DL (ref 1.6–2.4)
MCH RBC QN AUTO: 29.8 PG (ref 26–33)
MCHC RBC AUTO-ENTMCNC: 32.1 GM/DL (ref 32.2–35.5)
MCV RBC AUTO: 92.9 FL (ref 81–99)
NITRITE UR QL STRIP.AUTO: NEGATIVE
PH UR STRIP.AUTO: 7.5 [PH] (ref 5–9)
PLATELET # BLD AUTO: 198 THOU/MM3 (ref 130–400)
PMV BLD AUTO: 9.7 FL (ref 9.4–12.4)
POTASSIUM SERPL-SCNC: 4.4 MEQ/L (ref 3.5–5.2)
PROT UR STRIP.AUTO-MCNC: NEGATIVE MG/DL
RBC # BLD AUTO: 3.92 MILL/MM3 (ref 4.2–5.4)
RBC #/AREA URNS HPF: NORMAL /HPF
RENAL EPI CELLS #/AREA URNS HPF: NORMAL /[HPF]
SODIUM SERPL-SCNC: 143 MEQ/L (ref 135–145)
SPECIFIC GRAVITY UA: 1.01 (ref 1–1.03)
UROBILINOGEN, URINE: 0.2 EU/DL (ref 0–1)
WBC # BLD AUTO: 7.3 THOU/MM3 (ref 4.8–10.8)
WBC #/AREA URNS HPF: NORMAL /HPF
YEAST LIKE FUNGI URNS QL MICRO: NORMAL

## 2023-02-18 PROCEDURE — 36415 COLL VENOUS BLD VENIPUNCTURE: CPT

## 2023-02-18 PROCEDURE — 80048 BASIC METABOLIC PNL TOTAL CA: CPT

## 2023-02-18 PROCEDURE — 81001 URINALYSIS AUTO W/SCOPE: CPT

## 2023-02-18 PROCEDURE — 93005 ELECTROCARDIOGRAM TRACING: CPT | Performed by: INTERNAL MEDICINE

## 2023-02-18 PROCEDURE — 6370000000 HC RX 637 (ALT 250 FOR IP): Performed by: STUDENT IN AN ORGANIZED HEALTH CARE EDUCATION/TRAINING PROGRAM

## 2023-02-18 PROCEDURE — 82948 REAGENT STRIP/BLOOD GLUCOSE: CPT

## 2023-02-18 PROCEDURE — 6370000000 HC RX 637 (ALT 250 FOR IP): Performed by: INTERNAL MEDICINE

## 2023-02-18 PROCEDURE — 2580000003 HC RX 258: Performed by: STUDENT IN AN ORGANIZED HEALTH CARE EDUCATION/TRAINING PROGRAM

## 2023-02-18 PROCEDURE — 83735 ASSAY OF MAGNESIUM: CPT

## 2023-02-18 PROCEDURE — 6360000002 HC RX W HCPCS: Performed by: INTERNAL MEDICINE

## 2023-02-18 PROCEDURE — 6360000002 HC RX W HCPCS: Performed by: STUDENT IN AN ORGANIZED HEALTH CARE EDUCATION/TRAINING PROGRAM

## 2023-02-18 PROCEDURE — 85027 COMPLETE CBC AUTOMATED: CPT

## 2023-02-18 PROCEDURE — 99239 HOSP IP/OBS DSCHRG MGMT >30: CPT | Performed by: INTERNAL MEDICINE

## 2023-02-18 RX ORDER — MAGNESIUM SULFATE IN WATER 40 MG/ML
4000 INJECTION, SOLUTION INTRAVENOUS ONCE
Status: COMPLETED | OUTPATIENT
Start: 2023-02-18 | End: 2023-02-18

## 2023-02-18 RX ORDER — ADHESIVE TAPE 3"X 2.3 YD
1 TAPE, NON-MEDICATED TOPICAL 2 TIMES DAILY
Qty: 60 TABLET | Refills: 0 | Status: SHIPPED | OUTPATIENT
Start: 2023-02-18

## 2023-02-18 RX ORDER — LEVOTHYROXINE SODIUM 0.1 MG/1
100 TABLET ORAL DAILY
Qty: 30 TABLET | Refills: 3 | Status: SHIPPED | OUTPATIENT
Start: 2023-02-19

## 2023-02-18 RX ORDER — ASPIRIN 81 MG/1
81 TABLET, CHEWABLE ORAL DAILY
Status: DISCONTINUED | OUTPATIENT
Start: 2023-02-18 | End: 2023-02-18 | Stop reason: HOSPADM

## 2023-02-18 RX ORDER — LORAZEPAM 1 MG/1
0.5 TABLET ORAL EVERY 6 HOURS PRN
Qty: 30 TABLET | Refills: 0
Start: 2023-02-18 | End: 2023-03-20

## 2023-02-18 RX ORDER — BUSPIRONE HYDROCHLORIDE 10 MG/1
10 TABLET ORAL 2 TIMES DAILY
Qty: 60 TABLET | Refills: 3 | Status: SHIPPED | OUTPATIENT
Start: 2023-02-18 | End: 2023-06-18

## 2023-02-18 RX ORDER — LISINOPRIL 20 MG/1
20 TABLET ORAL DAILY
Qty: 30 TABLET | Refills: 3 | Status: SHIPPED | OUTPATIENT
Start: 2023-02-19

## 2023-02-18 RX ORDER — LORAZEPAM 1 MG/1
0.5 TABLET ORAL EVERY 6 HOURS PRN
Qty: 20 TABLET | Refills: 0 | Status: SHIPPED | OUTPATIENT
Start: 2023-02-18 | End: 2023-02-18 | Stop reason: HOSPADM

## 2023-02-18 RX ORDER — LORAZEPAM 1 MG/1
1 TABLET ORAL 4 TIMES DAILY
COMMUNITY
End: 2023-02-18 | Stop reason: SDUPTHER

## 2023-02-18 RX ORDER — ASPIRIN 81 MG/1
81 TABLET, CHEWABLE ORAL DAILY
Qty: 30 TABLET | Refills: 3 | Status: SHIPPED | OUTPATIENT
Start: 2023-02-19

## 2023-02-18 RX ORDER — PROPRANOLOL HYDROCHLORIDE 10 MG/1
10 TABLET ORAL 3 TIMES DAILY
Qty: 90 TABLET | Refills: 3 | Status: SHIPPED | OUTPATIENT
Start: 2023-02-18

## 2023-02-18 RX ADMIN — FOLIC ACID 1 MG: 1 TABLET ORAL at 08:18

## 2023-02-18 RX ADMIN — PROPRANOLOL HYDROCHLORIDE 10 MG: 10 TABLET ORAL at 08:18

## 2023-02-18 RX ADMIN — PANTOPRAZOLE SODIUM 40 MG: 40 TABLET, DELAYED RELEASE ORAL at 03:53

## 2023-02-18 RX ADMIN — LORAZEPAM 1 MG: 1 TABLET ORAL at 08:18

## 2023-02-18 RX ADMIN — ASPIRIN 81 MG 81 MG: 81 TABLET ORAL at 14:05

## 2023-02-18 RX ADMIN — MAGNESIUM SULFATE HEPTAHYDRATE 4000 MG: 40 INJECTION, SOLUTION INTRAVENOUS at 08:16

## 2023-02-18 RX ADMIN — BUSPIRONE HYDROCHLORIDE 10 MG: 10 TABLET ORAL at 08:18

## 2023-02-18 RX ADMIN — LISINOPRIL 20 MG: 20 TABLET ORAL at 08:18

## 2023-02-18 RX ADMIN — LEVOTHYROXINE SODIUM 100 MCG: 100 TABLET ORAL at 03:53

## 2023-02-18 RX ADMIN — LORAZEPAM 1 MG: 1 TABLET ORAL at 14:06

## 2023-02-18 RX ADMIN — Medication 1 TABLET: at 08:17

## 2023-02-18 RX ADMIN — PROPRANOLOL HYDROCHLORIDE 10 MG: 10 TABLET ORAL at 15:41

## 2023-02-18 RX ADMIN — SODIUM CHLORIDE: 9 INJECTION, SOLUTION INTRAVENOUS at 03:08

## 2023-02-18 RX ADMIN — Medication 100 MG: at 08:17

## 2023-02-18 RX ADMIN — ENOXAPARIN SODIUM 40 MG: 100 INJECTION SUBCUTANEOUS at 08:17

## 2023-02-18 ASSESSMENT — PAIN SCALES - GENERAL
PAINLEVEL_OUTOF10: 0
PAINLEVEL_OUTOF10: 0

## 2023-02-18 NOTE — FLOWSHEET NOTE
02/18/23 1017   Safe Environment   Safety Measures Other (comment)  (Virtual safety round complete)   Virtual nurse introduced via audio. Patient permits camera. Patient in bed, awake. Visitor at bedside. Patient states she is needing to go to the restroom but has already reached out to the bedside nurse and she is on her way. No other needs or concerns at this time. Call light is within reach.

## 2023-02-18 NOTE — PROGRESS NOTES
Discharge teaching and instructions completed with patient using teachback method. AVS reviewed. Prescriptions sent to specified pharmacy. Patient voiced understanding regarding prescriptions, follow up appointments, and care of self at home. Discharged home with all belongings.

## 2023-02-18 NOTE — PLAN OF CARE
Problem: Discharge Planning  Goal: Discharge to home or other facility with appropriate resources  2/18/2023 1000 by Marcio Medina RN  Outcome: Progressing  Flowsheets (Taken 2/18/2023 9005)  Discharge to home or other facility with appropriate resources: Identify barriers to discharge with patient and caregiver  Note: Discharge planning in process and discussed with patient/family. Social work consulted for any additional needs. Care manager aware of discharge needs. Problem: Safety - Adult  Goal: Free from fall injury  2/18/2023 1000 by Marcio Medina RN  Outcome: Progressing  Free From Fall Injury:   Instruct family/caregiver on patient safety   Based on caregiver fall risk screen, instruct family/caregiver to ask for assistance with transferring infant if caregiver noted to have fall risk factors  Note: Pt free from fall this shift. Problem: ABCDS Injury Assessment  Goal: Absence of physical injury  2/18/2023 1000 by Marcio Medina RN  Outcome: Progressing  Absence of Physical Injury: Implement safety measures based on patient assessment  Note: Pt free from physical injury this shift. Problem: Neurosensory - Adult  Goal: Achieves stable or improved neurological status  2/18/2023 1000 by Marcio Medina RN  Outcome: Progressing  Flowsheets (Taken 2/18/2023 0959)  Achieves stable or improved neurological status: Assess for and report changes in neurological status  Note: Pt alert and oriented x4 with some intermittent confusion. Able to appropriately follow commands and answer questions. Problem: Neurosensory - Adult  Goal: Absence of seizures  2/18/2023 1000 by Marcio Medina RN  Outcome: Progressing  Flowsheets (Taken 2/18/2023 0959)  Absence of seizures: Monitor for seizure activity.   If seizure occurs, document type and location of movements and any associated apnea     Problem: Metabolic/Fluid and Electrolytes - Adult  Description: Withdrawal  Goal: Glucose maintained within prescribed range  2/18/2023 1000 by Elisa Rainey RN  Outcome: Progressing  Flowsheets (Taken 2/18/2023 0959)  Glucose maintained within prescribed range: Monitor blood glucose as ordered     Problem: Skin/Tissue Integrity - Adult  Goal: Skin integrity remains intact  2/18/2023 1000 by Elisa Rainey RN  Outcome: Progressing  Flowsheets (Taken 2/18/2023 0959)  Skin Integrity Remains Intact: Monitor for areas of redness and/or skin breakdown     Problem: Musculoskeletal - Adult  Goal: Return mobility to safest level of function  2/18/2023 1000 by Elisa Rainey RN  Outcome: Progressing  Flowsheets (Taken 2/18/2023 0959)  Return Mobility to Safest Level of Function: Assess patient stability and activity tolerance for standing, transferring and ambulating with or without assistive devices     Problem: Pain  Goal: Verbalizes/displays adequate comfort level or baseline comfort level  2/18/2023 1000 by Elisa Rainey RN  Outcome: Progressing  Verbalizes/displays adequate comfort level or baseline comfort level:   Encourage patient to monitor pain and request assistance   Assess pain using appropriate pain scale   Administer analgesics based on type and severity of pain and evaluate response   Implement non-pharmacological measures as appropriate and evaluate response   Notify Licensed Independent Practitioner if interventions unsuccessful or patient reports new pain   Consider cultural and social influences on pain and pain management  Note: Patient able to use 0-10 pain scale, pain goal of no pain. Denies pain at this time. Agreeable to take PRN pain medications. Care plan reviewed with patient and family. Patient verbalizes understanding of the plan of care and contributed to goal setting.

## 2023-02-18 NOTE — PLAN OF CARE
Problem: Discharge Planning  Goal: Discharge to home or other facility with appropriate resources  2/17/2023 2056 by Sandy Hernandez RN  Outcome: Progressing  Flowsheets (Taken 2/17/2023 2056)  Discharge to home or other facility with appropriate resources:   Identify barriers to discharge with patient and caregiver   Arrange for needed discharge resources and transportation as appropriate   Identify discharge learning needs (meds, wound care, etc)   Refer to discharge planning if patient needs post-hospital services based on physician order or complex needs related to functional status, cognitive ability or social support system     Problem: Safety - Adult  Goal: Free from fall injury  2/17/2023 2056 by Sandy Hernandez RN  Outcome: Progressing  Flowsheets (Taken 2/17/2023 2056)  Free From Fall Injury:   Instruct family/caregiver on patient safety   Based on caregiver fall risk screen, instruct family/caregiver to ask for assistance with transferring infant if caregiver noted to have fall risk factors     Problem: ABCDS Injury Assessment  Goal: Absence of physical injury  2/17/2023 2056 by Sandy Hernandez RN  Outcome: Progressing  [address] (Taken 2/17/2023 2056)  Absence of Physical Injury: Implement safety measures based on patient assessment     Problem: Neurosensory - Adult  Goal: Achieves stable or improved neurological status  2/17/2023 2056 by Sandy Hernandez RN  Outcome: Progressing  Flowsheets (Taken 2/17/2023 2056)  Achieves stable or improved neurological status:   Assess for and report changes in neurological status   Initiate measures to prevent increased intracranial pressure   Monitor temperature, glucose, and sodium.  Initiate appropriate interventions as ordered   Maintain blood pressure and fluid volume within ordered parameters to optimize cerebral perfusion and minimize risk of hemorrhage     Problem: Neurosensory - Adult  Goal: Absence of seizures  Outcome: Progressing  Flowsheets (Taken 2/17/2023 2056)  Absence of seizures:   Monitor for seizure activity.   If seizure occurs, document type and location of movements and any associated apnea   If seizure occurs, turn head to side and suction secretions as needed   Support airway/breathing, administer oxygen as needed   Administer anticonvulsants as ordered   Diagnostic studies as ordered     Problem: Metabolic/Fluid and Electrolytes - Adult  Goal: Glucose maintained within prescribed range  Outcome: Progressing  Flowsheets (Taken 2/17/2023 2056)  Glucose maintained within prescribed range:   Monitor blood glucose as ordered   Assess for signs and symptoms of hyperglycemia and hypoglycemia   Administer ordered medications to maintain glucose within target range   Assess barriers to adequate nutritional intake and initiate nutrition consult as needed   Instruct patient on self management of diabetes and initiate consult as needed     Problem: Pain  Goal: Verbalizes/displays adequate comfort level or baseline comfort level  2/17/2023 2056 by Milo Keenan RN  Outcome: Progressing  Flowsheets (Taken 2/17/2023 2056)  Verbalizes/displays adequate comfort level or baseline comfort level:   Encourage patient to monitor pain and request assistance   Assess pain using appropriate pain scale   Administer analgesics based on type and severity of pain and evaluate response   Implement non-pharmacological measures as appropriate and evaluate response   Notify Licensed Independent Practitioner if interventions unsuccessful or patient reports new pain   Consider cultural and social influences on pain and pain management     Problem: Skin/Tissue Integrity - Adult  Goal: Skin integrity remains intact  Outcome: Progressing  Flowsheets  Taken 2/17/2023 2056 by Milo Keenan RN  Skin Integrity Remains Intact: Monitor for areas of redness and/or skin breakdown    Problem: Musculoskeletal - Adult  Goal: Return mobility to safest level of function  Outcome: Progressing  Flowsheets (Taken 2/17/2023 2056)  Return Mobility to Safest Level of Function:   Assess patient stability and activity tolerance for standing, transferring and ambulating with or without assistive devices   Assist with transfers and ambulation using safe patient handling equipment as needed   Obtain physical therapy/occupational therapy consults as needed   Instruct patient/family in ordered activity level     Problem: Metabolic/Fluid and Electrolytes - Adult  Goal: Electrolytes maintained within normal limits  2/17/2023 2056 by Paige Cardenas RN  Outcome: Completed  Flowsheets (Taken 2/17/2023 2056)  Electrolytes maintained within normal limits: Monitor response to electrolyte replacements, including repeat lab results as appropriate     Care plan reviewed with patient and family. Patient and family verbalize understanding of the plan of care and contribute to goal setting.

## 2023-02-18 NOTE — DISCHARGE SUMMARY
Discharge Summary     Patient Identification:  Lisa Veliz  : 1955  MRN: [id number]   Account: [de-identified]     Admit date: 2023  Discharge date: 2023   Attending provider: Dr. Assunta Cheadle      Primary care provider: Radha Henry     Discharge Diagnoses:     1) Drug induced encephalopathy, Benzodiazepine Withdrawal: Likely due to polypharmacy and EtOH intake. Patient reports taking Ativan 1 mg four times daily for anxiety (should be taking 1 mg TID). Imaging including CT head and CTA head/neck showed no acute intracranial findings or stenosis. Troponin WNL and EKG without ischemic changes. UDS negative. Serum EtOH <0.01. Patient is alert and oriented however she is anxious/jittery. Started Ativan 1 mg TID. Pscyhiatry consulted and recommended decreasing the dose outpatient to 0.5 mg Ativan Q6H PRN with the intention to eventually wean off the medication. Patient is to follow-up with the Psychiatrist outpatient. She is being discharged on Propranolol 10 mg TID and Buspar 10 mg BID that will be titrate outpatient as well. 2) EtOH abuse: Per significant other and chart. Patient drinks about 2 tall beers daily or more. Last drink was 23. Serum EtOH <0.01. Alcohol was combined with multiple anti anxiolytics. Patient had history of withdrawal seizures, no Dts. She also underwent prior rehab for EtOH and benzo use. PAWS score 5. Patient denies having any visual or auditory hallucinations. She does has some tachycardia and HTN. NO ACTIVE WITHDRAWAL. Patient had Benzo withdrawal, Phenobarbital stopped. Continue Thiamine, Folic Acid, and Multivitamin. 3) Prolonged Qtc: Noted on initial EKG, Qtc 504. Given 2g magnesium, mag level 2. Repeat EKG shows Qtc 500. Discontinue Zofran. Magnesium replaced, discharged with replacement medication. 4) Hypertension: Patient reports not taking Norvasc at home, discontinued at discharge.   Lisinopril increased from 10 mg to 20 mg, started Propranolol 10 mg TID. 5) Hyperlipidemia: Continue Lipitor 40 mg oral daily. 6) DMII: Hgb A1c 5.9. On Metformin at home, will hold while inpatient. Restart home Metformin. 7) Hypothyroidism: TSH 6.110. T4 Free 0.95. Discharged with Levothyroxine 100 mcg as per patient record. 8) GERD: Continue Protonix 40 mg oral daily. 9) Anxiety: Patient reports taking Venlafaxine 150 mg oral daily, Ambien 10 mg oral nightly, Ativan 1 mg four times daily (Prescribed TID), and Amitriptyline 150 mg oral nightly. Patient is to be discharged home with Buspar, Propranolol, and a modified dose of Ativan (0.5 mg Q6H PRN). Which will be titrated outpatient with Psychiatry. 10) Polysubstance abuse: Reported by significant other. Patient has been using increased dosage of Ativan, sleeping pills, and alcohol use. Follow-up outpatient with Psychiatry for Anxiety medication titration. 11.) Chronic Systolic CHF (EF 09% 6/9581): Echo noted to have a moderately low EF on 45%. No active chest pain, EKG changes, or active signs of heart failure. Suspecting secondary to Anxiety and array of medications taken for it. Patient to see Cardiology outpatient for further work-up. We are aware Propranolol is not recommended in CHF, however we are concentrating on control of anxiety at this time. Continue with Lisinopril, Lipitor, and ASA outpatient. 12.) Hypomagnesemia: Noted, potentially due to medications, replaced inpatient and discharged with replacement orally. Noted prolonged Qtc which is due to medications and Hypomagnesemia. From prior note:     From admitting HPI \"81 y.o. female with PMH HTN, HLD, COPD, Hypothyroidism, GERD, seizures, anxiety and polysubstance abuse who presented to [address] [place] [address] for altered mental status. The patient is brought by significant other. He reports that the patient displayed confusion prior to evening dinner.   During dinner she had 2 drinks and demonstrated slurred/garbled speech followed by falling x4. He states he had lost coordination and was weak. At this point EMS was called. He endorses that the patient has been taking more Ativan, pain pills and sleeping pills. Additionally she drinks on average of 2 tall beer cans, if not more per day. Patient has a history of alcohol withdrawal, rehab admission and seizures. No DTs. The ED, vitals Tmax 97.5, RR 20, , /71, SPO2 97% on RA. Labs significant for K3.4, CO2 20, AG 17, glucose 240. Troponin WNL. EKG sinus tachycardia with , QTc 504. UDS negative. Serum EtOH < 0.01.  CT head with no acute intracranial findings. CTA head/neck with no high-grade cervical ICA or intracranial artery stenosis. \"     During her hospital stay, she was noticed to be anxious and jittery which was caused by Ativan withdrawal/baseline Anxiety. She takes 1 mg Ativan 4x daily outpatient along with Ambien to sleep. Psychiatry was consulted for recommendations on medication management. Discussed with the patient the need for long acting Anti-anxiolytics and to eventually stop Ativan all together. The patient is agreeable to start Buspar and Propranolol in patient. Due to tachycardia, an Echo was preformed and showed a decrease EF to 45% with a section of the anterior septal wall that was hypokinetic. She had no active chest pain or EKG changes during admission. The decision was made to follow-up out patient with Cardiology for continued work-up outpatient. She is being discharged on Propranolol to help with anxiety, we are aware that Metoprolol, Coreg, and Bisoprolol are preferred in CHF, however her heart will likely benefit more from anxiety control. She is to continue Lisinopril, Statin, and ASA at this time. She is to take Buspar outpatient, and her Ativan is recommended to be decreased to 0.5 mg Q6H PRN. Follow-up visits are placed to Psychiatry, Cardiology, and her PCP.     Discharge Medications: Medication List        START taking these medications      aspirin 81 MG chewable tablet  Take 1 tablet by mouth daily  Start taking on: February 19, 2023     busPIRone 10 MG tablet  Commonly known as: BUSPAR  Take 1 tablet by mouth 2 times daily     Magnesium Oxide 200 MG Tabs  Commonly known as: Mag-Oxide  Take 1 tablet by mouth in the morning and at bedtime     propranolol 10 MG tablet  Commonly known as: INDERAL  Take 1 tablet by mouth 3 times daily     vitamin B-1 100 MG tablet  Commonly known as: THIAMINE  Take 1 tablet by mouth daily            CHANGE how you take these medications      levothyroxine 100 MCG tablet  Commonly known as: SYNTHROID  Take 1 tablet by mouth Daily  Start taking on: February 19, 2023  What changed:   medication strength  how much to take     lisinopril 20 MG tablet  Commonly known as: PRINIVIL;ZESTRIL  Take 1 tablet by mouth daily  Start taking on: February 19, 2023  What changed:   medication strength  how much to take     LORazepam 1 MG tablet  Commonly known as: ATIVAN  Take 0.5 tablets by mouth every 6 hours as needed for Anxiety for up to 30 days. Take total of 0.5mg every 6 hours as needed, continue taper per outpatient provider recommendation Max Daily Amount: 2 mg  What changed:   how much to take  when to take this  reasons to take this  additional instructions  Another medication with the same name was removed. Continue taking this medication, and follow the directions you see here.             CONTINUE taking these medications      alendronate 70 MG tablet  Commonly known as: FOSAMAX     atorvastatin 40 MG tablet  Commonly known as: LIPITOR  Take 1 tablet by mouth every evening     cetirizine 10 MG tablet  Commonly known as: ZYRTEC     fluticasone 50 MCG/ACT nasal spray  Commonly known as: FLONASE     multivitamin Tabs tablet  Take 1 tablet by mouth daily     omeprazole 20 MG delayed release capsule  Commonly known as: PRILOSEC            STOP taking these medications amitriptyline 150 MG tablet  Commonly known as: ELAVIL     amLODIPine 5 MG tablet  Commonly known as: NORVASC     folic acid 1 MG tablet  Commonly known as: FOLVITE     metFORMIN 750 MG extended release tablet  Commonly known as: GLUCOPHAGE-XR     venlafaxine 150 MG extended release capsule  Commonly known as: EFFEXOR XR     zolpidem 10 MG tablet  Commonly known as: AMBIEN               Where to Get Your Medications        These medications were sent to [facility]  [address], [address] [facility]      Phone: [phone]   aspirin 81 MG chewable tablet  busPIRone 10 MG tablet  levothyroxine 100 MCG tablet  lisinopril 20 MG tablet  Magnesium Oxide 200 MG Tabs  propranolol 10 MG tablet       Information about where to get these medications is not yet available    Ask your nurse or doctor about these medications  LORazepam 1 MG tablet         Patient Instructions:    Discharge lab work: None. Activity: activity as tolerated  Diet: ADULT DIET; Regular    Code Status: Full Code    Follow-up visits:   [place] [id number]  [phone]    Schedule an appointment as soon as possible for a visit in 1 week(s)  Hospital Follow-up, refill medications as needed, note begining to wean Ativan. [address] [facility]  [address]  Schedule an appointment as soon as possible for a visit in 1 week(s)  Patient Transitioning off Benzodiazepines to longer acting Anti-anxiolytics. Will need follow-up during this. [facility]  [address] [address]  [phone]  Schedule an appointment as soon as possible for a visit in 2 week(s)  New low EF(45%), recomend medication titration and further outpatient work-up. Procedures:     None.     Consults:   IP CONSULT TO ADDICTION SERVICES  IP CONSULT TO PSYCHIATRY    Examination:  Vitals:  Vitals:    02/18/23 0805 02/18/23 1046 02/18/23 1103 02/18/23 1519   BP: (!) 161/88 (!) 149/86 137/87 (!) 157/99   Pulse: 90 80 79 89   Resp: 18 16 16   Temp: 98.1 °F (36.7 °C)  97.9 °F (36.6 °C) 98.1 °F (36.7 °C)   TempSrc: Oral  Oral Oral   SpO2: 98%  97% 99%   Weight:       Height:         Weight: Weight: 147 lb 11.3 oz (67 kg)     24 hour intake/output:  Intake/Output Summary (Last 24 hours) at 2/18/2023 1851  Last data filed at 2/18/2023 1507  Gross per 24 hour   Intake 250 ml   Output 300 ml   Net -50 ml       General appearance: No apparent distress, appears stated age and cooperative. HEENT: Pupils equal, round, and reactive to light. Conjunctivae/corneas clear. Neck: Supple, with full range of motion. No jugular venous distention. Trachea midline. Respiratory:  Normal respiratory effort. Clear to auscultation, bilaterally without Rales/Wheezes/Rhonchi. Cardiovascular: Regular rate and rhythm with normal S1/S2 without murmurs, rubs or gallops. Abdomen: Soft, non-tender, non-distended with normal bowel sounds. Musculoskeletal: passive and active ROM x 4 extremities. Skin: Skin color, texture, turgor normal.  No rashes or lesions. Neurologic:  Neurovascularly intact without any focal sensory/motor deficits. Cranial nerves: II-XII intact, grossly non-focal.  Psychiatric: Alert and oriented, minor-ly anxious  Capillary Refill: Brisk,< 3 seconds   Peripheral Pulses: +2 palpable, equal bilaterally        Significant Diagnostics:   Radiology: CTA HEAD W WO CONTRAST    Result Date: 2/16/2023  CT angiography head with contrast. 3D Postprocessing. Comparison: CT/SR - CT HEAD WO CONTRAST - 02/16/2023 08:16 PM EST Findings: Intracranial arteries are patent. No aneurysm, dissection, or occlusion. No abnormal intracranial enhancement. No intracranial mass, midline shift, hydrocephalus, abnormal contrast enhancement, or acute hemorrhage.  No skull fracture. No high-grade intracranial artery stenosis. This document has been electronically signed by: Marnie Truong DO on 02/16/2023 08:51 PM All CTs at this facility use dose modulation techniques and iterative reconstructions, and/or weight-based dosing when appropriate to reduce radiation to a low as reasonably achievable. 3D Post-processing was performed on this study. CT HEAD WO CONTRAST    Result Date: 2/16/2023  CT head without contrast Comparison: CT - CT HEAD WO CONTR - 08/22/2016 06:39 PM EDT Findings: No intra-axial mass, midline shift, hydrocephalus, or acute hemorrhage. Mild cerebral atrophy. Left posterior septal deviation with a left septal spur. The visualized paranasal sinuses and mastoid air cells are normal. The orbits are unremarkable. There is no acute fracture. 1. No acute intracranial findings This document has been electronically signed by: Marnie Truong DO on 02/16/2023 08:48 PM All CTs at this facility use dose modulation techniques and iterative reconstructions, and/or weight-based dosing when appropriate to reduce radiation to a low as reasonably achievable. CTA NECK W WO CONTRAST    Result Date: 2/16/2023  CT angiography neck with contrast. 3D Postprocessing. Comparison: CT/SR - CTA HEAD W WO CONTRAST - 02/16/2023 08:16 PM EST Findings: Aortic arch and cervical great vessels are patent. Visualized intracranial arteries are patent. No aneurysm, dissection, or occlusion. Thyroid gland unremarkable. No cervical mass or fluid collection. Normal carotid bifurcation. No significant carotid atherosclerosis. Patent bilateral cervical internal carotid arteries. Mild bilateral cavernous ICA vascular calcifications. Mild bilateral upper lobe peripherally interstitial changes. No acute fracture. No high-grade cervical ICA stenosis.  This document has been electronically signed by: Marnie Truong DO on 02/16/2023 09:03 PM All CTs at this facility use dose modulation techniques and iterative reconstructions, and/or weight-based dosing when appropriate to reduce radiation to a low as reasonably achievable. Carotid stenosis and measurements are in accordance with NASCET criteria. 3D Post-processing was performed on this study.       Labs:   Recent Results (from the past 72 hour(s))   EKG 12 Lead    Collection Time: 02/16/23  6:35 PM   Result Value Ref Range    Ventricular Rate 114 BPM    Atrial Rate 114 BPM    P-R Interval 178 ms    QRS Duration 116 ms    Q-T Interval 366 ms    QTc Calculation (Bazett) 504 ms    P Axis 34 degrees    R Axis 4 degrees    T Axis 39 degrees   POCT Glucose    Collection Time: 02/16/23  6:44 PM   Result Value Ref Range    POC Glucose 266 (H) 70 - 108 mg/dl   POCT Glucose    Collection Time: 02/16/23  6:58 PM   Result Value Ref Range    Glucose 266 mg/dL   Ethanol    Collection Time: 02/16/23  7:00 PM   Result Value Ref Range    ETHYL ALCOHOL, SERUM < 0.01 0.00 %   CBC with Auto Differential    Collection Time: 02/16/23  7:00 PM   Result Value Ref Range    WBC 6.9 4.8 - 10.8 thou/mm3    RBC 4.47 4.20 - 5.40 mill/mm3    Hemoglobin 13.2 12.0 - 16.0 gm/dl    Hematocrit 39.5 37.0 - 47.0 %    MCV 88.4 81.0 - 99.0 fL    MCH 29.5 26.0 - 33.0 pg    MCHC 33.4 32.2 - 35.5 gm/dl    RDW-CV 13.1 11.5 - 14.5 %    RDW-SD 42.5 35.0 - 45.0 fL    Platelets 708 683 - 079 thou/mm3    MPV 9.4 9.4 - 12.4 fL    Seg Neutrophils 62.3 %    Lymphocytes 28.5 %    Monocytes 5.7 %    Eosinophils 2.2 %    Basophils 0.7 %    Immature Granulocytes 0.6 %    Segs Absolute 4.3 1.8 - 7.7 thou/mm3    Lymphocytes Absolute 2.0 1.0 - 4.8 thou/mm3    Monocytes Absolute 0.4 0.4 - 1.3 thou/mm3    Eosinophils Absolute 0.2 0.0 - 0.4 thou/mm3    Basophils Absolute 0.0 0.0 - 0.1 thou/mm3    Immature Grans (Abs) 0.04 0.00 - 0.07 thou/mm3    nRBC 0 /100 wbc   CMP    Collection Time: 02/16/23  7:00 PM   Result Value Ref Range    Glucose 240 (H) 70 - 108 mg/dL    Creatinine 0.7 0.4 - 1.2 mg/dL BUN 12 7 - 22 mg/dL    Sodium 138 135 - 145 meq/L    Potassium 3.4 (L) 3.5 - 5.2 meq/L    Chloride 101 98 - 111 meq/L    CO2 20 (L) 23 - 33 meq/L    Calcium 9.2 8.5 - 10.5 mg/dL    AST 29 5 - 40 U/L    Alkaline Phosphatase 118 38 - 126 U/L    Total Protein 6.9 6.1 - 8.0 g/dL    Albumin 3.9 3.5 - 5.1 g/dL    Total Bilirubin 0.2 (L) 0.3 - 1.2 mg/dL    ALT 43 11 - 66 U/L   Troponin    Collection Time: 02/16/23  7:00 PM   Result Value Ref Range    Troponin T < 0.010 ng/ml   Anion Gap    Collection Time: 02/16/23  7:00 PM   Result Value Ref Range    Anion Gap 17.0 (H) 8.0 - 16.0 meq/L   Osmolality    Collection Time: 02/16/23  7:00 PM   Result Value Ref Range    Osmolality Calc 283.3 275.0 - 300.0 mOsmol/kg   Glomerular Filtration Rate, Estimated    Collection Time: 02/16/23  7:00 PM   Result Value Ref Range    Est, Glom Filt Rate >60 >60 ml/min/1.73m2   Protime-INR    Collection Time: 02/16/23  7:14 PM   Result Value Ref Range    INR 0.93 0.85 - 1.13   Urine Drug Screen    Collection Time: 02/16/23  8:00 PM   Result Value Ref Range    Amphetamine+Methamphetamine Urine Screen Negative NEGATIVE    Barbiturate Quant, Ur Negative NEGATIVE    Benzodiazepine Quant, Ur Negative NEGATIVE    Cannabinoid Quant, Ur Negative NEGATIVE    Cocaine Metab Quant, Ur Negative NEGATIVE    Opiates, Urine Negative NEGATIVE    Oxycodone Negative NEGATIVE    PCP Quant, Ur Negative NEGATIVE    Fentanyl Negative NEGATIVE   Basic Metabolic Panel w/ Reflex to MG    Collection Time: 02/17/23  3:53 AM   Result Value Ref Range    Sodium 140 135 - 145 meq/L    Potassium reflex Magnesium 3.9 3.5 - 5.2 meq/L    Chloride 105 98 - 111 meq/L    CO2 24 23 - 33 meq/L    Glucose 113 (H) 70 - 108 mg/dL    BUN 12 7 - 22 mg/dL    Creatinine 0.6 0.4 - 1.2 mg/dL    Calcium 8.7 8.5 - 10.5 mg/dL   Calcium, Ionized    Collection Time: 02/17/23  3:53 AM   Result Value Ref Range    Calcium, Ionized 1.16 1.12 - 1.32 mmol/L   Hemoglobin A1C    Collection Time: 02/17/23  3:53 AM   Result Value Ref Range    Hemoglobin A1C 5.9 4.4 - 6.4 %    AVERAGE GLUCOSE 117 70 - 126 mg/dL   CBC    Collection Time: 02/17/23  3:53 AM   Result Value Ref Range    WBC 7.2 4.8 - 10.8 thou/mm3    RBC 4.15 (L) 4.20 - 5.40 mill/mm3    Hemoglobin 11.9 (L) 12.0 - 16.0 gm/dl    Hematocrit 37.9 37.0 - 47.0 %    MCV 91.3 81.0 - 99.0 fL    MCH 28.7 26.0 - 33.0 pg    MCHC 31.4 (L) 32.2 - 35.5 gm/dl    RDW-CV 13.2 11.5 - 14.5 %    RDW-SD 44.0 35.0 - 45.0 fL    Platelets 861 393 - 104 thou/mm3    MPV 9.3 (L) 9.4 - 12.4 fL   Magnesium    Collection Time: 02/17/23  3:53 AM   Result Value Ref Range    Magnesium 2.0 1.6 - 2.4 mg/dL   Phosphorus    Collection Time: 02/17/23  3:53 AM   Result Value Ref Range    Phosphorus 3.5 2.4 - 4.7 mg/dL   TSH with Reflex    Collection Time: 02/17/23  3:53 AM   Result Value Ref Range    TSH 6.110 (H) 0.400 - 4.200 uIU/mL   Anion Gap    Collection Time: 02/17/23  3:53 AM   Result Value Ref Range    Anion Gap 11.0 8.0 - 16.0 meq/L   Glomerular Filtration Rate, Estimated    Collection Time: 02/17/23  3:53 AM   Result Value Ref Range    Est, Glom Filt Rate >60 >60 ml/min/1.73m2   T4, Free    Collection Time: 02/17/23  3:53 AM   Result Value Ref Range    T4 Free 0.95 0.93 - 1.76 ng/dL   POCT Glucose    Collection Time: 02/17/23  6:16 AM   Result Value Ref Range    POC Glucose 124 (H) 70 - 108 mg/dl   EKG 12 Lead    Collection Time: 02/17/23  6:56 AM   Result Value Ref Range    Ventricular Rate 95 BPM    Atrial Rate 95 BPM    P-R Interval 192 ms    QRS Duration 112 ms    Q-T Interval 398 ms    QTc Calculation (Bazett) 500 ms    P Axis 40 degrees    R Axis -3 degrees    T Axis 44 degrees   POCT Glucose    Collection Time: 02/17/23 10:26 AM   Result Value Ref Range    POC Glucose 118 (H) 70 - 108 mg/dl   POCT Glucose    Collection Time: 02/17/23  3:42 PM   Result Value Ref Range    POC Glucose 163 (H) 70 - 108 mg/dl   ECHO Complete 2D W Doppler W Color    Collection Time: 02/17/23 3:46 PM   Result Value Ref Range    Left Ventricular Ejection Fraction 45     LVEF MODALITY ECHO    Urinalysis with Reflex to Culture    Collection Time: 02/17/23  6:34 PM    Specimen: Urine   Result Value Ref Range    Glucose, Ur NEGATIVE NEGATIVE mg/dl    Bilirubin Urine NEGATIVE NEGATIVE    Ketones, Urine NEGATIVE NEGATIVE    Specific Gravity, Urine 1.012 1.002 - 1.030    Blood, Urine NEGATIVE NEGATIVE    pH, UA 5.5 5.0 - 9.0    Protein, UA NEGATIVE NEGATIVE    Urobilinogen, Urine 0.2 0.0 - 1.0 eu/dl    Nitrite, Urine NEGATIVE NEGATIVE    Leukocyte Esterase, Urine NEGATIVE NEGATIVE    Color, UA YELLOW STRAW-YELLOW    Character, Urine CLEAR CLEAR-SL CLOUD   Urine Drug Screen    Collection Time: 02/17/23  6:34 PM   Result Value Ref Range    Amphetamine+Methamphetamine Urine Screen Negative NEGATIVE    Barbiturate Quant, Ur Negative NEGATIVE    Benzodiazepine Quant, Ur Negative NEGATIVE    Cannabinoid Quant, Ur Negative NEGATIVE    Cocaine Metab Quant, Ur Negative NEGATIVE    Opiates, Urine Negative NEGATIVE    Oxycodone Negative NEGATIVE    PCP Quant, Ur Negative NEGATIVE    Fentanyl Negative NEGATIVE   POCT Glucose    Collection Time: 02/17/23  8:01 PM   Result Value Ref Range    POC Glucose 173 (H) 70 - 108 mg/dl   Basic Metabolic Panel w/ Reflex to MG    Collection Time: 02/18/23  3:47 AM   Result Value Ref Range    Sodium 143 135 - 145 meq/L    Potassium reflex Magnesium 4.4 3.5 - 5.2 meq/L    Chloride 109 98 - 111 meq/L    CO2 24 23 - 33 meq/L    Glucose 159 (H) 70 - 108 mg/dL    BUN 9 7 - 22 mg/dL    Creatinine 0.7 0.4 - 1.2 mg/dL    Calcium 8.6 8.5 - 10.5 mg/dL   CBC    Collection Time: 02/18/23  3:47 AM   Result Value Ref Range    WBC 7.3 4.8 - 10.8 thou/mm3    RBC 3.92 (L) 4.20 - 5.40 mill/mm3    Hemoglobin 11.7 (L) 12.0 - 16.0 gm/dl    Hematocrit 36.4 (L) 37.0 - 47.0 %    MCV 92.9 81.0 - 99.0 fL    MCH 29.8 26.0 - 33.0 pg    MCHC 32.1 (L) 32.2 - 35.5 gm/dl    RDW-CV 13.4 11.5 - 14.5 %    RDW-SD 45.8 (H) 35.0 - 45.0 fL    Platelets 439 908 - 268 thou/mm3    MPV 9.7 9.4 - 12.4 fL   Magnesium    Collection Time: 02/18/23  3:47 AM   Result Value Ref Range    Magnesium 1.5 (L) 1.6 - 2.4 mg/dL   Anion Gap    Collection Time: 02/18/23  3:47 AM   Result Value Ref Range    Anion Gap 10.0 8.0 - 16.0 meq/L   Glomerular Filtration Rate, Estimated    Collection Time: 02/18/23  3:47 AM   Result Value Ref Range    Est, Glom Filt Rate >60 >60 ml/min/1.73m2   POCT Glucose    Collection Time: 02/18/23  6:57 AM   Result Value Ref Range    POC Glucose 161 (H) 70 - 108 mg/dl   POCT Glucose    Collection Time: 02/18/23 10:19 AM   Result Value Ref Range    POC Glucose 130 (H) 70 - 108 mg/dl   Magnesium    Collection Time: 02/18/23  1:31 PM   Result Value Ref Range    Magnesium 2.4 1.6 - 2.4 mg/dL   Urinalysis with Microscopic    Collection Time: 02/18/23  2:11 PM   Result Value Ref Range    Glucose, Urine NEGATIVE NEGATIVE mg/dl    Bilirubin Urine NEGATIVE NEGATIVE    Ketones, Urine NEGATIVE NEGATIVE    Specific Gravity, UA 1.007 1.002 - 1.030    Blood, Urine NEGATIVE NEGATIVE    pH, UA 7.5 5.0 - 9.0    Protein, UA NEGATIVE NEGATIVE mg/dl    Urobilinogen, Urine 0.2 0.0 - 1.0 eu/dl    Nitrite, Urine NEGATIVE NEGATIVE    Leukocyte Esterase, Urine NEGATIVE NEGATIVE    Color, UA YELLOW YELLOW-STRAW    Character, Urine CLEAR [id number].[email or web address]    RBC, UA NONE SEEN 0-2/hpf /hpf    WBC, UA NONE SEEN 0-4/hpf /hpf    Epithelial Cells, UA NONE SEEN 3-5/hpf /hpf    Bacteria, UA NONE SEEN FEW/NONE SEEN    Casts NONE SEEN NONE SEEN /lpf    Crystals NONE SEEN NONE SEEN    Renal Epithelial, UA NONE SEEN NONE SEEN    Yeast, UA NONE SEEN NONE SEEN    Casts NONE SEEN /lpf    Miscellaneous Lab Test Result NONE SEEN    POCT Glucose    Collection Time: 02/18/23  3:38 PM   Result Value Ref Range    POC Glucose 150 (H) 70 - 108 mg/dl   EKG 12 Lead    Collection Time: 02/18/23  4:06 PM   Result Value Ref Range    Ventricular Rate 94 BPM    Atrial Rate 94 BPM    P-R Interval 174 ms    QRS Duration 100 ms    Q-T Interval 366 ms    QTc Calculation (Bazett) 457 ms    P Axis 43 degrees    R Axis 14 degrees    T Axis 39 degrees       Discharge condition: good  Disposition: Home  Time spent on discharge: 45 minutes    Electronically signed by Joanna Humphreys DO on 2/18/2023 at 6:51 PM

## 2023-02-18 NOTE — CONSULTS
Department of Psychiatry   Psychiatric Assessment      Thank you very much for allowing us to participate in the care of this patient. Reason for Consult:  drug induced encephalopathy     HISTORY OF PRESENT ILLNESS:    Patient is 22-year-old female with history of bipolar disorder and anxiety along with benzodiazepine abuse admitted for altered mentation. Psychiatry was consulted after concerns from daughter about her abusing her benzodiazepines. Patient does report that she has been drinking 2-3 times a week when she is going out and socializing. Reports that she does take Ativan 4 times daily along with zolpidem at nighttime. Reviewed [facility] report and she did had a recent fill on zolpidem and Ativan by 2 different providers. Patient does report that in the past she went to a rehab after she was abusing Xanax. Reports that after she got out from the rehab facility she was put back on Ativan by Dr Oscar Matthews. Mentions that he never sees her and she only communicates with him over the phone call. Patient is minimizing her alcohol use and has been going back and forth about the amount of alcohol that she has been drinking. Reports that she never abuses her Ativan and has been taking as prescribed-4 times daily. Reports her mood has been relatively well controlled. Denies any overt feelings of sad down or depression. Currently denies any significant withdrawal symptoms. Reviewed medications and her Ativan was restarted as scheduled. Discussed with her about the risk of mixing benzodiazepines, sedative hypnotics and alcohol and the risk of confusion and possible death. Patient is very hesitant about going off any benzodiazepines even though she clearly mentioned that it was a problem and she was abusing them in the past.  Reports that she gets into constant conflicts with her daughter about these medications. Denies any suicidal or homicidal ideation plan or intent today.     PSYCHIATRIC HISTORY: Outpatient psychiatric provider:  Dr Paola Jacobs attempts: denies any  Inpatient psychiatric admissions: one admission to  in 1997    [facility] Psychiatric Review of Systems         Obsessions and Compulsions: Denies       Velma or Hypomania: denies      Hallucinations: Denies     Panic Attacks:  Denies     Delusions:  Denies     Phobias:  Denies     Trauma: Denies    Prior to Admission medications    Medication Sig Start Date End Date Taking? Authorizing Provider   amLODIPine (NORVASC) 5 MG tablet Take 1 tablet by mouth daily Hold for SBP less than 100 mmHg  Patient not taking: Reported on 2/17/2023 6/28/21   Ephraim Pretty, PA-C   Multiple Vitamin (MULTIVITAMIN) TABS tablet Take 1 tablet by mouth daily 6/28/21 7/28/21  Ephraim Pretty, PA-C   vitamin B-1 (THIAMINE) 100 MG tablet Take 1 tablet by mouth daily  Patient not taking: Reported on 2/17/2023 6/27/21   Ephraim Alyce, PA-C   folic acid (FOLVITE) 1 MG tablet Take 1 tablet by mouth daily  Patient not taking: Reported on 2/17/2023 6/27/21   Ephraim Mead PA-C   metFORMIN (GLUCOPHAGE-XR) 750 MG extended release tablet Take 750 mg by mouth Daily with supper  Patient not taking: Reported on 2/17/2023    Historical Provider, MD   amitriptyline (ELAVIL) 150 MG tablet Take 150 mg by mouth nightly  Patient not taking: Reported on 2/17/2023    Historical Provider, MD   venlafaxine (EFFEXOR XR) 150 MG extended release capsule Take 150 mg by mouth daily    Historical Provider, MD   cetirizine (ZYRTEC) 10 MG tablet Take 10 mg by mouth daily    Historical Provider, MD   zolpidem (AMBIEN) 10 MG tablet Take 10 mg by mouth nightly. Historical Provider, MD   alendronate (FOSAMAX) 70 MG tablet Take 70 mg by mouth every 7 days    Historical Provider, MD   LORazepam (ATIVAN) 1 MG tablet Take 1 mg by mouth 3 times daily.     Historical Provider, MD   atorvastatin (LIPITOR) 40 MG tablet Take 1 tablet by mouth every evening 8/31/16   Jaylin Knight MD   lisinopril (PRINIVIL;ZESTRIL) 10 MG tablet Take 10 mg by mouth daily     Historical Provider, MD   levothyroxine (SYNTHROID) 50 MCG tablet Take 1 tablet by mouth Daily  Patient taking differently: Take 100 mcg by mouth Daily 5/10/16   Leo Jauregui MD   omeprazole (PRILOSEC) 20 MG capsule Take 20 mg by mouth 2 times daily as needed     Historical Provider, MD   fluticasone (FLONASE) 50 MCG/ACT nasal spray 2 sprays by Nasal route daily Indications: Use two sprays in each nostril once daily    Historical Provider, MD        Medications:    Current Facility-Administered Medications: aspirin chewable tablet 81 mg, 81 mg, Oral, Daily  atorvastatin (LIPITOR) tablet 40 mg, 40 mg, Oral, QPM  folic acid (FOLVITE) tablet 1 mg, 1 mg, Oral, Daily  multivitamin 1 tablet, 1 tablet, Oral, Daily  pantoprazole (PROTONIX) tablet 40 mg, 40 mg, Oral, QAM AC  thiamine tablet 100 mg, 100 mg, Oral, Daily  glucose chewable tablet 16 g, 4 tablet, Oral, PRN  dextrose bolus 10% 125 mL, 125 mL, IntraVENous, PRN **OR** dextrose bolus 10% 250 mL, 250 mL, IntraVENous, PRN  glucagon (rDNA) injection 1 mg, 1 mg, SubCUTAneous, PRN  dextrose 10 % infusion, , IntraVENous, Continuous PRN  insulin lispro (HUMALOG) injection vial 0-8 Units, 0-8 Units, SubCUTAneous, TID WC  insulin lispro (HUMALOG) injection vial 0-4 Units, 0-4 Units, SubCUTAneous, Nightly  sodium chloride flush 0.9 % injection 5-40 mL, 5-40 mL, IntraVENous, 2 times per day  sodium chloride flush 0.9 % injection 10 mL, 10 mL, IntraVENous, PRN  0.9 % sodium chloride infusion, , IntraVENous, PRN  enoxaparin (LOVENOX) injection 40 mg, 40 mg, SubCUTAneous, Daily  [Held by provider] ondansetron (ZOFRAN-ODT) disintegrating tablet 4 mg, 4 mg, Oral, Q8H PRN **OR** [Held by provider] ondansetron (ZOFRAN) injection 4 mg, 4 mg, IntraVENous, Q6H PRN  polyethylene glycol (GLYCOLAX) packet 17 g, 17 g, Oral, Daily PRN  acetaminophen (TYLENOL) tablet 650 mg, 650 mg, Oral, Q6H PRN **OR** acetaminophen (TYLENOL) suppository 650 mg, 650 mg, Rectal, Q6H PRN  levothyroxine (SYNTHROID) tablet 100 mcg, 100 mcg, Oral, Daily  LORazepam (ATIVAN) tablet 1 mg, 1 mg, Oral, TID  lisinopril (PRINIVIL;ZESTRIL) tablet 20 mg, 20 mg, Oral, Daily  LORazepam (ATIVAN) injection 1 mg, 1 mg, IntraVENous, Q6H PRN  busPIRone (BUSPAR) tablet 10 mg, 10 mg, Oral, BID  propranolol (INDERAL) tablet 10 mg, 10 mg, Oral, TID     Past Medical History:        Diagnosis Date    Anxiety     COPD (chronic obstructive pulmonary disease) (HCC)     GERD (gastroesophageal reflux disease)     Hyperlipidemia     Hypertension     Psychiatric problem     Seizures ([address]     Thyroid disease        Past Surgical History:        Procedure Laterality Date    ABDOMEN SURGERY      csection x 2    COLONOSCOPY      EYE SURGERY  2010 approx    cataract removal    INOCENCIA STEROTACTIC LOC BREAST BIOPSY LEFT Left 2012    neg    INOCENCIA STEROTACTIC LOC BREAST BIOPSY RIGHT Right 2019    neg       Allergies: Penicillins      Social History:    Born in: [facility]  Family: 2 living kids  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  Willingness to ask for help    SUBSTANCE USE HISTORY: benzodiazepines     Family Medical and Psychiatric History:         Problem Relation Age of Onset    Heart Disease Mother     High Blood Pressure Mother     High Cholesterol Mother     Esophageal Cancer Mother 47        mets to the breast    Kidney Disease Father     Breast Cancer Neg Hx     Ovarian Cancer Neg Hx        Physical  /87   Pulse 79   Temp 97.9 °F (36.6 °C) (Oral)   Resp 16   Ht 5' 5\" (1.651 m)   Wt 147 lb 11.3 oz (67 kg)   LMP  (LMP Unknown)   SpO2 97%   BMI 24.58 kg/m²     Mental Status Examination:  Level of consciousness:  Within normal limits  Appearance: hospital attire, lying in bed, fair grooming  Behavior/Motor:  no abnormalities noted  Attitude toward examiner:  cooperative, attentive and good eye contact  Speech:  Spontaneous, normal rate and volume  Mood:  anxious  Affect: mood congruent  Thought processes:  Linear, goal directed, coherent  Thought content: denies suicidal ideations   Denies homicidal ideations    Denies hallucinations   Denies delusions  Cognition:  Oriented to self, situation, location, date  Concentration clinically adequate  Memory age appropriate  Insight & Judgment:  fair    DSM-5 DIAGNOSIS:  Mood disorder unspecified  Benzodiazepine dependence    Stressors     Severity of stressors is moderate  Source of stressors include: Other psychosocial and environmental stressors    PLAN:    If patient is agreeable to switch care to a different provider recommend connecting her with another outpatient psychiatrist as Dr. Donna Olvera is known to prescribe high doses of benzodiazepines. Does not require inpatient psychiatric hospitalization. Patient does have a history of seizures. Offered to wean her off of the Ativan by using Librium however she is not interested in this plan at this time. Can cut down on Ativan from 1 mg 4 times daily to 0.5 mg 4 times daily and educate her on a plan to come off of the medication in the next 1 to 3 months. We will continue to follow as needed. Thank you very much for allowing us to participate in the care of this patient.       Electronically signed by Román Ricketts MD on 2/18/23 at 1:34 PM EST

## 2023-02-19 LAB
EKG ATRIAL RATE: 94 BPM
EKG P AXIS: 43 DEGREES
EKG P-R INTERVAL: 174 MS
EKG Q-T INTERVAL: 366 MS
EKG QRS DURATION: 100 MS
EKG QTC CALCULATION (BAZETT): 457 MS
EKG R AXIS: 14 DEGREES
EKG T AXIS: 39 DEGREES
EKG VENTRICULAR RATE: 94 BPM

## 2023-02-19 PROCEDURE — 93010 ELECTROCARDIOGRAM REPORT: CPT | Performed by: INTERNAL MEDICINE

## 2023-03-06 ENCOUNTER — APPOINTMENT (OUTPATIENT)
Dept: CT IMAGING | Age: 68
DRG: 177 | End: 2023-03-06
Payer: MEDICARE

## 2023-03-06 ENCOUNTER — HOSPITAL ENCOUNTER (INPATIENT)
Age: 68
LOS: 3 days | Discharge: HOME | DRG: 177 | End: 2023-03-09
Attending: EMERGENCY MEDICINE | Admitting: INTERNAL MEDICINE
Payer: MEDICARE

## 2023-03-06 ENCOUNTER — APPOINTMENT (OUTPATIENT)
Dept: GENERAL RADIOLOGY | Age: 68
DRG: 177 | End: 2023-03-06
Payer: MEDICARE

## 2023-03-06 DIAGNOSIS — R09.02 HYPOXIA: ICD-10-CM

## 2023-03-06 DIAGNOSIS — J18.9 PNEUMONIA DUE TO INFECTIOUS ORGANISM, UNSPECIFIED LATERALITY, UNSPECIFIED PART OF LUNG: Primary | ICD-10-CM

## 2023-03-06 DIAGNOSIS — F13.20 BENZODIAZEPINE DEPENDENCE (HCC): ICD-10-CM

## 2023-03-06 PROBLEM — J96.01 ACUTE RESPIRATORY FAILURE WITH HYPOXIA (HCC): Status: ACTIVE | Noted: 2023-03-06

## 2023-03-06 LAB
AMPHETAMINES UR QL SCN: NEGATIVE
ANION GAP SERPL CALC-SCNC: 14 MEQ/L (ref 8–16)
BARBITURATES UR QL SCN: NEGATIVE
BASE EXCESS BLDA CALC-SCNC: 4 MMOL/L (ref -2–3)
BASOPHILS ABSOLUTE: 0 THOU/MM3 (ref 0–0.1)
BASOPHILS NFR BLD AUTO: 0.4 %
BENZODIAZ UR QL SCN: NEGATIVE
BUN SERPL-MCNC: 13 MG/DL (ref 7–22)
BZE UR QL SCN: NEGATIVE
CALCIUM SERPL-MCNC: 8.9 MG/DL (ref 8.5–10.5)
CANNABINOIDS UR QL SCN: NEGATIVE
CHLORIDE SERPL-SCNC: 104 MEQ/L (ref 98–111)
CO2 SERPL-SCNC: 22 MEQ/L (ref 23–33)
COLLECTED BY:: ABNORMAL
CREAT SERPL-MCNC: 0.8 MG/DL (ref 0.4–1.2)
DEPRECATED RDW RBC AUTO: 42.7 FL (ref 35–45)
DEVICE: ABNORMAL
EOSINOPHIL NFR BLD AUTO: 1.9 %
EOSINOPHILS ABSOLUTE: 0.2 THOU/MM3 (ref 0–0.4)
ERYTHROCYTE [DISTWIDTH] IN BLOOD BY AUTOMATED COUNT: 13 % (ref 11.5–14.5)
ETHANOL SERPL-MCNC: < 0.01 %
FENTANYL: NEGATIVE
FIO2 ON VENT O2 ANALYZER: 3 %
FLUAV RNA RESP QL NAA+PROBE: NOT DETECTED
FLUBV RNA RESP QL NAA+PROBE: NOT DETECTED
GFR SERPL CREATININE-BSD FRML MDRD: > 60 ML/MIN/1.73M2
GLUCOSE SERPL-MCNC: 175 MG/DL (ref 70–108)
HCO3 BLDA-SCNC: 30 MMOL/L (ref 23–28)
HCT VFR BLD AUTO: 38.3 % (ref 37–47)
HGB BLD-MCNC: 12.4 GM/DL (ref 12–16)
IMM GRANULOCYTES # BLD AUTO: 0.04 THOU/MM3 (ref 0–0.07)
IMM GRANULOCYTES NFR BLD AUTO: 0.4 %
LYMPHOCYTES ABSOLUTE: 1.6 THOU/MM3 (ref 1–4.8)
LYMPHOCYTES NFR BLD AUTO: 16.6 %
MAGNESIUM SERPL-MCNC: 1.6 MG/DL (ref 1.6–2.4)
MCH RBC QN AUTO: 29.5 PG (ref 26–33)
MCHC RBC AUTO-ENTMCNC: 32.4 GM/DL (ref 32.2–35.5)
MCV RBC AUTO: 91 FL (ref 81–99)
MONOCYTES ABSOLUTE: 0.6 THOU/MM3 (ref 0.4–1.3)
MONOCYTES NFR BLD AUTO: 6.5 %
NEUTROPHILS NFR BLD AUTO: 74.2 %
NRBC BLD AUTO-RTO: 0 /100 WBC
OPIATES UR QL SCN: NEGATIVE
OSMOLALITY SERPL CALC.SUM OF ELEC: 283.8 MOSMOL/KG (ref 275–300)
OXYCODONE: NEGATIVE
PCO2 TEMP ADJ BLDMV: 48 MMHG (ref 41–51)
PCP UR QL SCN: NEGATIVE
PH BLDMV: 7.4 [PH] (ref 7.31–7.41)
PLATELET # BLD AUTO: 232 THOU/MM3 (ref 130–400)
PMV BLD AUTO: 9.7 FL (ref 9.4–12.4)
PO2 BLDMV: 22 MMHG (ref 25–40)
POTASSIUM SERPL-SCNC: 3.8 MEQ/L (ref 3.5–5.2)
PROCALCITONIN SERPL IA-MCNC: 0.03 NG/ML (ref 0.01–0.09)
RBC # BLD AUTO: 4.21 MILL/MM3 (ref 4.2–5.4)
SAO2 % BLDMV: 36 %
SARS-COV-2 RNA RESP QL NAA+PROBE: NOT DETECTED
SEGMENTED NEUTROPHILS ABSOLUTE COUNT: 7 THOU/MM3 (ref 1.8–7.7)
SODIUM SERPL-SCNC: 140 MEQ/L (ref 135–145)
T4 FREE SERPL-MCNC: 1.57 NG/DL (ref 0.93–1.76)
TROPONIN T: < 0.01 NG/ML
TSH SERPL DL<=0.005 MIU/L-ACNC: 1.89 UIU/ML (ref 0.4–4.2)
WBC # BLD AUTO: 9.4 THOU/MM3 (ref 4.8–10.8)

## 2023-03-06 PROCEDURE — 36415 COLL VENOUS BLD VENIPUNCTURE: CPT

## 2023-03-06 PROCEDURE — 87636 SARSCOV2 & INF A&B AMP PRB: CPT

## 2023-03-06 PROCEDURE — 80048 BASIC METABOLIC PNL TOTAL CA: CPT

## 2023-03-06 PROCEDURE — 71275 CT ANGIOGRAPHY CHEST: CPT

## 2023-03-06 PROCEDURE — 93005 ELECTROCARDIOGRAM TRACING: CPT | Performed by: EMERGENCY MEDICINE

## 2023-03-06 PROCEDURE — 85025 COMPLETE CBC W/AUTO DIFF WBC: CPT

## 2023-03-06 PROCEDURE — 70450 CT HEAD/BRAIN W/O DYE: CPT

## 2023-03-06 PROCEDURE — 6360000004 HC RX CONTRAST MEDICATION: Performed by: EMERGENCY MEDICINE

## 2023-03-06 PROCEDURE — 84443 ASSAY THYROID STIM HORMONE: CPT

## 2023-03-06 PROCEDURE — 84145 PROCALCITONIN (PCT): CPT

## 2023-03-06 PROCEDURE — 82803 BLOOD GASES ANY COMBINATION: CPT

## 2023-03-06 PROCEDURE — 83735 ASSAY OF MAGNESIUM: CPT

## 2023-03-06 PROCEDURE — 84439 ASSAY OF FREE THYROXINE: CPT

## 2023-03-06 PROCEDURE — 6360000002 HC RX W HCPCS: Performed by: EMERGENCY MEDICINE

## 2023-03-06 PROCEDURE — 2580000003 HC RX 258: Performed by: EMERGENCY MEDICINE

## 2023-03-06 PROCEDURE — 80307 DRUG TEST PRSMV CHEM ANLYZR: CPT

## 2023-03-06 PROCEDURE — 99285 EMERGENCY DEPT VISIT HI MDM: CPT

## 2023-03-06 PROCEDURE — 87040 BLOOD CULTURE FOR BACTERIA: CPT

## 2023-03-06 PROCEDURE — 1200000003 HC TELEMETRY R&B

## 2023-03-06 PROCEDURE — 84484 ASSAY OF TROPONIN QUANT: CPT

## 2023-03-06 PROCEDURE — 71045 X-RAY EXAM CHEST 1 VIEW: CPT

## 2023-03-06 PROCEDURE — 82077 ASSAY SPEC XCP UR&BREATH IA: CPT

## 2023-03-06 RX ORDER — LEVOTHYROXINE SODIUM 0.1 MG/1
100 TABLET ORAL DAILY
Status: DISCONTINUED | OUTPATIENT
Start: 2023-03-07 | End: 2023-03-09 | Stop reason: HOSPADM

## 2023-03-06 RX ORDER — METRONIDAZOLE 500 MG/100ML
500 INJECTION, SOLUTION INTRAVENOUS ONCE
Status: COMPLETED | OUTPATIENT
Start: 2023-03-06 | End: 2023-03-07

## 2023-03-06 RX ORDER — ONDANSETRON 4 MG/1
4 TABLET, ORALLY DISINTEGRATING ORAL EVERY 8 HOURS PRN
Status: DISCONTINUED | OUTPATIENT
Start: 2023-03-06 | End: 2023-03-09 | Stop reason: HOSPADM

## 2023-03-06 RX ORDER — SODIUM CHLORIDE 9 MG/ML
INJECTION, SOLUTION INTRAVENOUS PRN
Status: DISCONTINUED | OUTPATIENT
Start: 2023-03-06 | End: 2023-03-09 | Stop reason: HOSPADM

## 2023-03-06 RX ORDER — ACETAMINOPHEN 325 MG/1
650 TABLET ORAL EVERY 6 HOURS PRN
Status: DISCONTINUED | OUTPATIENT
Start: 2023-03-06 | End: 2023-03-07

## 2023-03-06 RX ORDER — PHENOBARBITAL SODIUM 65 MG/ML
260 INJECTION INTRAMUSCULAR
Status: DISCONTINUED | OUTPATIENT
Start: 2023-03-06 | End: 2023-03-09 | Stop reason: HOSPADM

## 2023-03-06 RX ORDER — ACETAMINOPHEN 650 MG/1
650 SUPPOSITORY RECTAL EVERY 6 HOURS PRN
Status: DISCONTINUED | OUTPATIENT
Start: 2023-03-06 | End: 2023-03-07

## 2023-03-06 RX ORDER — PHENOBARBITAL SODIUM 65 MG/ML
130 INJECTION INTRAMUSCULAR
Status: DISCONTINUED | OUTPATIENT
Start: 2023-03-06 | End: 2023-03-09 | Stop reason: HOSPADM

## 2023-03-06 RX ORDER — ENOXAPARIN SODIUM 100 MG/ML
40 INJECTION SUBCUTANEOUS DAILY
Status: DISCONTINUED | OUTPATIENT
Start: 2023-03-07 | End: 2023-03-09 | Stop reason: HOSPADM

## 2023-03-06 RX ORDER — ASPIRIN 81 MG/1
81 TABLET, CHEWABLE ORAL DAILY
Status: DISCONTINUED | OUTPATIENT
Start: 2023-03-07 | End: 2023-03-09 | Stop reason: HOSPADM

## 2023-03-06 RX ORDER — FLUTICASONE PROPIONATE 50 MCG
2 SPRAY, SUSPENSION (ML) NASAL DAILY
Status: DISCONTINUED | OUTPATIENT
Start: 2023-03-07 | End: 2023-03-09 | Stop reason: HOSPADM

## 2023-03-06 RX ORDER — SODIUM CHLORIDE 0.9 % (FLUSH) 0.9 %
5-40 SYRINGE (ML) INJECTION PRN
Status: DISCONTINUED | OUTPATIENT
Start: 2023-03-06 | End: 2023-03-09 | Stop reason: HOSPADM

## 2023-03-06 RX ORDER — ATORVASTATIN CALCIUM 40 MG/1
40 TABLET, FILM COATED ORAL EVERY EVENING
Status: DISCONTINUED | OUTPATIENT
Start: 2023-03-07 | End: 2023-03-09 | Stop reason: HOSPADM

## 2023-03-06 RX ORDER — LISINOPRIL 20 MG/1
20 TABLET ORAL DAILY
Status: DISCONTINUED | OUTPATIENT
Start: 2023-03-07 | End: 2023-03-08

## 2023-03-06 RX ORDER — LANOLIN ALCOHOL/MO/W.PET/CERES
100 CREAM (GRAM) TOPICAL DAILY
Status: DISCONTINUED | OUTPATIENT
Start: 2023-03-07 | End: 2023-03-09 | Stop reason: HOSPADM

## 2023-03-06 RX ORDER — BUSPIRONE HYDROCHLORIDE 10 MG/1
10 TABLET ORAL 2 TIMES DAILY
Status: DISCONTINUED | OUTPATIENT
Start: 2023-03-07 | End: 2023-03-09 | Stop reason: HOSPADM

## 2023-03-06 RX ORDER — ALENDRONATE SODIUM 70 MG/1
70 TABLET ORAL
Status: DISCONTINUED | OUTPATIENT
Start: 2023-03-06 | End: 2023-03-07 | Stop reason: RX

## 2023-03-06 RX ORDER — SODIUM CHLORIDE 0.9 % (FLUSH) 0.9 %
5-40 SYRINGE (ML) INJECTION EVERY 12 HOURS SCHEDULED
Status: DISCONTINUED | OUTPATIENT
Start: 2023-03-06 | End: 2023-03-09 | Stop reason: HOSPADM

## 2023-03-06 RX ORDER — ONDANSETRON 2 MG/ML
4 INJECTION INTRAMUSCULAR; INTRAVENOUS EVERY 6 HOURS PRN
Status: DISCONTINUED | OUTPATIENT
Start: 2023-03-06 | End: 2023-03-09 | Stop reason: HOSPADM

## 2023-03-06 RX ORDER — POLYETHYLENE GLYCOL 3350 17 G/17G
17 POWDER, FOR SOLUTION ORAL DAILY PRN
Status: DISCONTINUED | OUTPATIENT
Start: 2023-03-06 | End: 2023-03-09 | Stop reason: HOSPADM

## 2023-03-06 RX ADMIN — IOPAMIDOL 80 ML: 755 INJECTION, SOLUTION INTRAVENOUS at 21:01

## 2023-03-06 RX ADMIN — CEFTRIAXONE SODIUM 1000 MG: 1 INJECTION, POWDER, FOR SOLUTION INTRAMUSCULAR; INTRAVENOUS at 21:53

## 2023-03-06 ASSESSMENT — PAIN - FUNCTIONAL ASSESSMENT
PAIN_FUNCTIONAL_ASSESSMENT: NONE - DENIES PAIN
PAIN_FUNCTIONAL_ASSESSMENT: NONE - DENIES PAIN

## 2023-03-06 ASSESSMENT — PAIN SCALES - GENERAL: PAINLEVEL_OUTOF10: 5

## 2023-03-06 NOTE — ED NOTES
Pt placed on 2 L NC at this time due to O2 saturations around 86 percent on room air.      Giovanna Hernandez RN  03/06/23 8633

## 2023-03-06 NOTE — ED TRIAGE NOTES
Pt to the ED via lobby with complaint of altered mental status. EMS stated they brought pt to the ED 2 weeks ago for the same symptoms and EMS stated she was detoxing from alcohol. Pt stated she tripped over a coffee table and could not get up. Pt a/o x2 person and place.

## 2023-03-07 LAB
ALBUMIN SERPL BCG-MCNC: 3.8 G/DL (ref 3.5–5.1)
ALP SERPL-CCNC: 126 U/L (ref 38–126)
ALT SERPL W/O P-5'-P-CCNC: 33 U/L (ref 11–66)
ANION GAP SERPL CALC-SCNC: 13 MEQ/L (ref 8–16)
AST SERPL-CCNC: 33 U/L (ref 5–40)
BASOPHILS ABSOLUTE: 0 THOU/MM3 (ref 0–0.1)
BASOPHILS NFR BLD AUTO: 0.4 %
BILIRUB SERPL-MCNC: 0.3 MG/DL (ref 0.3–1.2)
BUN SERPL-MCNC: 9 MG/DL (ref 7–22)
CALCIUM SERPL-MCNC: 8.8 MG/DL (ref 8.5–10.5)
CHLORIDE SERPL-SCNC: 105 MEQ/L (ref 98–111)
CO2 SERPL-SCNC: 24 MEQ/L (ref 23–33)
CORTIS SERPL-MCNC: 9.74 UG/DL
CORTISOL COLLECTION INFO: NORMAL
CREAT SERPL-MCNC: 0.7 MG/DL (ref 0.4–1.2)
DEPRECATED RDW RBC AUTO: 42.4 FL (ref 35–45)
EKG ATRIAL RATE: 83 BPM
EKG P AXIS: 28 DEGREES
EKG P-R INTERVAL: 196 MS
EKG Q-T INTERVAL: 394 MS
EKG QRS DURATION: 114 MS
EKG QTC CALCULATION (BAZETT): 462 MS
EKG R AXIS: -21 DEGREES
EKG T AXIS: 41 DEGREES
EKG VENTRICULAR RATE: 83 BPM
EOSINOPHIL NFR BLD AUTO: 3 %
EOSINOPHILS ABSOLUTE: 0.2 THOU/MM3 (ref 0–0.4)
ERYTHROCYTE [DISTWIDTH] IN BLOOD BY AUTOMATED COUNT: 12.8 % (ref 11.5–14.5)
GFR SERPL CREATININE-BSD FRML MDRD: > 60 ML/MIN/1.73M2
GLUCOSE BLD STRIP.AUTO-MCNC: 117 MG/DL (ref 70–108)
GLUCOSE SERPL-MCNC: 118 MG/DL (ref 70–108)
HCT VFR BLD AUTO: 39.5 % (ref 37–47)
HGB BLD-MCNC: 12.9 GM/DL (ref 12–16)
IMM GRANULOCYTES # BLD AUTO: 0.03 THOU/MM3 (ref 0–0.07)
IMM GRANULOCYTES NFR BLD AUTO: 0.4 %
LYMPHOCYTES ABSOLUTE: 1.1 THOU/MM3 (ref 1–4.8)
LYMPHOCYTES NFR BLD AUTO: 14.9 %
MCH RBC QN AUTO: 29.5 PG (ref 26–33)
MCHC RBC AUTO-ENTMCNC: 32.7 GM/DL (ref 32.2–35.5)
MCV RBC AUTO: 90.4 FL (ref 81–99)
MONOCYTES ABSOLUTE: 0.5 THOU/MM3 (ref 0.4–1.3)
MONOCYTES NFR BLD AUTO: 6.7 %
NEUTROPHILS NFR BLD AUTO: 74.6 %
NRBC BLD AUTO-RTO: 0 /100 WBC
OSMOLALITY SERPL CALC.SUM OF ELEC: 282.9 MOSMOL/KG (ref 275–300)
PLATELET # BLD AUTO: 220 THOU/MM3 (ref 130–400)
PMV BLD AUTO: 9.6 FL (ref 9.4–12.4)
POTASSIUM SERPL-SCNC: 3.8 MEQ/L (ref 3.5–5.2)
PROT SERPL-MCNC: 6.4 G/DL (ref 6.1–8)
RBC # BLD AUTO: 4.37 MILL/MM3 (ref 4.2–5.4)
SEGMENTED NEUTROPHILS ABSOLUTE COUNT: 5.3 THOU/MM3 (ref 1.8–7.7)
SODIUM SERPL-SCNC: 142 MEQ/L (ref 135–145)
WBC # BLD AUTO: 7.1 THOU/MM3 (ref 4.8–10.8)

## 2023-03-07 PROCEDURE — 6370000000 HC RX 637 (ALT 250 FOR IP)

## 2023-03-07 PROCEDURE — 1200000003 HC TELEMETRY R&B

## 2023-03-07 PROCEDURE — 2500000003 HC RX 250 WO HCPCS: Performed by: EMERGENCY MEDICINE

## 2023-03-07 PROCEDURE — 6360000002 HC RX W HCPCS

## 2023-03-07 PROCEDURE — 97162 PT EVAL MOD COMPLEX 30 MIN: CPT

## 2023-03-07 PROCEDURE — 82948 REAGENT STRIP/BLOOD GLUCOSE: CPT

## 2023-03-07 PROCEDURE — 82533 TOTAL CORTISOL: CPT

## 2023-03-07 PROCEDURE — 97535 SELF CARE MNGMENT TRAINING: CPT

## 2023-03-07 PROCEDURE — 97530 THERAPEUTIC ACTIVITIES: CPT

## 2023-03-07 PROCEDURE — 2580000003 HC RX 258: Performed by: EMERGENCY MEDICINE

## 2023-03-07 PROCEDURE — 92610 EVALUATE SWALLOWING FUNCTION: CPT

## 2023-03-07 PROCEDURE — 6360000002 HC RX W HCPCS: Performed by: EMERGENCY MEDICINE

## 2023-03-07 PROCEDURE — 97165 OT EVAL LOW COMPLEX 30 MIN: CPT

## 2023-03-07 PROCEDURE — 99232 SBSQ HOSP IP/OBS MODERATE 35: CPT | Performed by: INTERNAL MEDICINE

## 2023-03-07 PROCEDURE — 99222 1ST HOSP IP/OBS MODERATE 55: CPT | Performed by: INTERNAL MEDICINE

## 2023-03-07 PROCEDURE — 93010 ELECTROCARDIOGRAM REPORT: CPT | Performed by: INTERNAL MEDICINE

## 2023-03-07 PROCEDURE — 36415 COLL VENOUS BLD VENIPUNCTURE: CPT

## 2023-03-07 PROCEDURE — 2580000003 HC RX 258

## 2023-03-07 PROCEDURE — 82530 CORTISOL FREE: CPT

## 2023-03-07 PROCEDURE — 94669 MECHANICAL CHEST WALL OSCILL: CPT

## 2023-03-07 PROCEDURE — 6370000000 HC RX 637 (ALT 250 FOR IP): Performed by: INTERNAL MEDICINE

## 2023-03-07 PROCEDURE — 80053 COMPREHEN METABOLIC PANEL: CPT

## 2023-03-07 PROCEDURE — 85025 COMPLETE CBC W/AUTO DIFF WBC: CPT

## 2023-03-07 RX ORDER — AMITRIPTYLINE HYDROCHLORIDE 150 MG/1
1 TABLET, FILM COATED ORAL NIGHTLY
Status: ON HOLD | COMMUNITY
Start: 2023-03-01 | End: 2023-03-09 | Stop reason: HOSPADM

## 2023-03-07 RX ORDER — LORAZEPAM 0.5 MG/1
0.5 TABLET ORAL EVERY 6 HOURS PRN
Status: DISCONTINUED | OUTPATIENT
Start: 2023-03-07 | End: 2023-03-09 | Stop reason: HOSPADM

## 2023-03-07 RX ORDER — DEXTROSE MONOHYDRATE 100 MG/ML
INJECTION, SOLUTION INTRAVENOUS CONTINUOUS PRN
Status: DISCONTINUED | OUTPATIENT
Start: 2023-03-07 | End: 2023-03-09 | Stop reason: HOSPADM

## 2023-03-07 RX ORDER — ACETAMINOPHEN 650 MG/1
650 SUPPOSITORY RECTAL EVERY 6 HOURS PRN
Status: DISCONTINUED | OUTPATIENT
Start: 2023-03-07 | End: 2023-03-09 | Stop reason: HOSPADM

## 2023-03-07 RX ORDER — MULTIVITAMIN WITH IRON
1 TABLET ORAL DAILY
Status: DISCONTINUED | OUTPATIENT
Start: 2023-03-07 | End: 2023-03-09 | Stop reason: HOSPADM

## 2023-03-07 RX ORDER — MAGNESIUM SULFATE IN WATER 40 MG/ML
2000 INJECTION, SOLUTION INTRAVENOUS ONCE
Status: COMPLETED | OUTPATIENT
Start: 2023-03-07 | End: 2023-03-07

## 2023-03-07 RX ORDER — ACETAMINOPHEN 325 MG/1
650 TABLET ORAL EVERY 6 HOURS PRN
Status: DISCONTINUED | OUTPATIENT
Start: 2023-03-07 | End: 2023-03-09 | Stop reason: HOSPADM

## 2023-03-07 RX ORDER — FOLIC ACID 1 MG/1
1 TABLET ORAL DAILY
Status: DISCONTINUED | OUTPATIENT
Start: 2023-03-07 | End: 2023-03-09 | Stop reason: HOSPADM

## 2023-03-07 RX ORDER — INSULIN LISPRO 100 [IU]/ML
0-4 INJECTION, SOLUTION INTRAVENOUS; SUBCUTANEOUS
Status: DISCONTINUED | OUTPATIENT
Start: 2023-03-07 | End: 2023-03-07

## 2023-03-07 RX ORDER — VENLAFAXINE HYDROCHLORIDE 75 MG/1
1 CAPSULE, EXTENDED RELEASE ORAL DAILY
COMMUNITY
Start: 2023-02-02

## 2023-03-07 RX ORDER — INSULIN LISPRO 100 [IU]/ML
0-4 INJECTION, SOLUTION INTRAVENOUS; SUBCUTANEOUS NIGHTLY
Status: DISCONTINUED | OUTPATIENT
Start: 2023-03-07 | End: 2023-03-07

## 2023-03-07 RX ADMIN — SODIUM CHLORIDE, PRESERVATIVE FREE 10 ML: 5 INJECTION INTRAVENOUS at 09:01

## 2023-03-07 RX ADMIN — ATORVASTATIN CALCIUM 40 MG: 40 TABLET, FILM COATED ORAL at 01:59

## 2023-03-07 RX ADMIN — AZITHROMYCIN MONOHYDRATE 500 MG: 500 INJECTION, POWDER, LYOPHILIZED, FOR SOLUTION INTRAVENOUS at 02:26

## 2023-03-07 RX ADMIN — Medication 100 MG: at 08:55

## 2023-03-07 RX ADMIN — Medication 1 TABLET: at 08:55

## 2023-03-07 RX ADMIN — FOLIC ACID 1 MG: 1 TABLET ORAL at 08:55

## 2023-03-07 RX ADMIN — SODIUM CHLORIDE, PRESERVATIVE FREE 10 ML: 5 INJECTION INTRAVENOUS at 21:30

## 2023-03-07 RX ADMIN — BUSPIRONE HYDROCHLORIDE 10 MG: 10 TABLET ORAL at 08:55

## 2023-03-07 RX ADMIN — LISINOPRIL 20 MG: 20 TABLET ORAL at 08:55

## 2023-03-07 RX ADMIN — LEVOTHYROXINE SODIUM 100 MCG: 0.1 TABLET ORAL at 06:26

## 2023-03-07 RX ADMIN — ACETAMINOPHEN 650 MG: 325 TABLET ORAL at 21:48

## 2023-03-07 RX ADMIN — ENOXAPARIN SODIUM 40 MG: 100 INJECTION SUBCUTANEOUS at 09:00

## 2023-03-07 RX ADMIN — CEFTRIAXONE SODIUM 1000 MG: 1 INJECTION, POWDER, FOR SOLUTION INTRAMUSCULAR; INTRAVENOUS at 21:55

## 2023-03-07 RX ADMIN — ATORVASTATIN CALCIUM 40 MG: 40 TABLET, FILM COATED ORAL at 21:48

## 2023-03-07 RX ADMIN — ASPIRIN 81 MG 81 MG: 81 TABLET ORAL at 09:00

## 2023-03-07 RX ADMIN — BUSPIRONE HYDROCHLORIDE 10 MG: 10 TABLET ORAL at 01:59

## 2023-03-07 RX ADMIN — ACETAMINOPHEN 650 MG: 325 TABLET ORAL at 09:10

## 2023-03-07 RX ADMIN — MAGNESIUM SULFATE HEPTAHYDRATE 2000 MG: 40 INJECTION, SOLUTION INTRAVENOUS at 02:06

## 2023-03-07 RX ADMIN — SODIUM CHLORIDE: 9 INJECTION, SOLUTION INTRAVENOUS at 02:05

## 2023-03-07 RX ADMIN — METRONIDAZOLE 500 MG: 500 INJECTION, SOLUTION INTRAVENOUS at 04:27

## 2023-03-07 RX ADMIN — BUSPIRONE HYDROCHLORIDE 10 MG: 10 TABLET ORAL at 21:48

## 2023-03-07 RX ADMIN — FLUTICASONE PROPIONATE 2 SPRAY: 50 SPRAY, METERED NASAL at 08:55

## 2023-03-07 ASSESSMENT — PAIN DESCRIPTION - ONSET
ONSET: ON-GOING

## 2023-03-07 ASSESSMENT — PAIN SCALES - GENERAL
PAINLEVEL_OUTOF10: 2
PAINLEVEL_OUTOF10: 2
PAINLEVEL_OUTOF10: 3
PAINLEVEL_OUTOF10: 2
PAINLEVEL_OUTOF10: 6

## 2023-03-07 ASSESSMENT — PAIN DESCRIPTION - FREQUENCY
FREQUENCY: INTERMITTENT
FREQUENCY: CONTINUOUS
FREQUENCY: CONTINUOUS

## 2023-03-07 ASSESSMENT — PAIN DESCRIPTION - LOCATION
LOCATION: SHOULDER

## 2023-03-07 ASSESSMENT — PAIN DESCRIPTION - ORIENTATION
ORIENTATION: MID
ORIENTATION: RIGHT
ORIENTATION: RIGHT

## 2023-03-07 ASSESSMENT — PAIN DESCRIPTION - DESCRIPTORS
DESCRIPTORS: ACHING
DESCRIPTORS: DISCOMFORT;OTHER (COMMENT)
DESCRIPTORS: DISCOMFORT;OTHER (COMMENT)

## 2023-03-07 ASSESSMENT — PAIN DESCRIPTION - PAIN TYPE
TYPE: ACUTE PAIN

## 2023-03-07 ASSESSMENT — PAIN - FUNCTIONAL ASSESSMENT
PAIN_FUNCTIONAL_ASSESSMENT: ACTIVITIES ARE NOT PREVENTED

## 2023-03-07 ASSESSMENT — PAIN DESCRIPTION - DIRECTION
RADIATING_TOWARDS: RIGHT NECK
RADIATING_TOWARDS: RIGHT NECK

## 2023-03-07 NOTE — H&P
Internal Medicine Resident History and Physical          Patient: Juana Gomes  : 1955  MRN: [id number]     Acct: [de-identified]    PCP: Polo Martin  Date of Admission: 3/6/2023  Date of Service: Pt seen/examined on 23  and Admitted to Inpatient with expected LOS greater than two midnights due to medical therapy. Hospital Problems             Last Modified POA    * (Principal) Acute respiratory failure with hypoxia ([address] 3/6/2023 Yes       Assessment and Plan:    Near Syncope: Likely due to polypharmacy. Patient states that she feels lightheaded and falls to the floor occasionally. She denies LOC. But states that she crawls to the living room couch. Her partner found her on the floor in the kitchen a day prior to presentation, and she was conscious. CT head on 3/6/2023 demonstrated no acute intracranial process. EKG on 3/6/2023, demonstrated normal sinus rhythm with incomplete right bundle branch block. CTA neck on 2023 showed no high-grade cervical ICA stenosis. Hx of etOh abuse, as she was admitted 3 weeks ago for etOh withdrawal. EtOh level on admission <0.01%. She cannot remember when she had her last drink. Last echo on 2023 demonstrated EF of 45% with hypokinetic motion of mid anteroseptal wall of the left ventricle. Hold propranolol, Zyrtec, Ativan, and propranolol. Perform orthostatic vital signs every shift  Ativan can be resumed after 24 hours to prevent withdrawal during admission. Continuous telemetry monitoring  Metabolic encephalopathy: Likely due to polypharmacy vs CAP . Did not Present with confusion and altered mental status . Patient was AAO x4, and was able to answer questions which some amnesia. Patient takes Ativan 4 times daily. CT of the head without contrast demonstrated no acute intracranial process. TSH and T4 were WNL, urine tox screen was negative. CTA of the chest w/wo contrast demonstrated right middle lobe segmental pneumonia.   Urine cortisol test pending  Continue to hold propranolol, Ativan and propranolol  See treatment of Cap in #3  CAP: likely Due to pneumonitis from possible aspiration 2/2 reduced consciousness from polypharmacy. CXR demonstrated multifocal bilateral opacities, while CTA chest w/wo contrast demonstrated abnormal airspace opacity in the right  middle lobe. WBC WNL, temp 97.7, RR 14. Pro laci 0.03 on 3/6  Continue rocephin daily, as she has a penicillin allergy and may not be a good candidate for zosyn   Continue flagyl x 1 dose   Blood cultures, respiratory cultures and pneumonia panel pending   Reevaluate antibiotics when culture results are out.      Alcohol used disorder: last etOH withdrawal incident was on the 2/16/2023.  Serum EtOH at POA was < 0.01. PAWS score 6. Pt states she cant remember when she had her last drink.   Continue folic acid, multivitamin and thiamine daily  Start phenobarbital prophylaxis  Addiction services consulted   Primary Hypertension noted. Continue home medications lisinopril   Hyperlipidemia: continue  Lipitor   NIDDM 2: controlled on metformin at home. Last A1c on 2/17/2023 5.9  POCT checks 4 hourly    Low dose SSI   Hypoglycemic protocol in place   Acquired hypothyroidism: TSH 1.89 and T4 1.57 on 3/6/2023. Continue home synthroid.   GERD: start Protonix 40 mg daily    Anxiety Disorder: continue Buspar. Hold ativan and propranolol. revaluate mental status in the am. Continue ativan to prevent withdrawal if patient is oriented in time, place and person in the AM.  Follow up with outpatient psychiatry to wean off ativan   Hx of Polysubstance abuse: Pt is on polypharmacy with Ativan, follow-up with psychiatry outpatient to wean off Ativan, and alcohol use disorder.  Chronic systolic CHF/HFmrEF: not in exacerbation. Echo on 2/23/2023 demonstrates EF of 45%.  Patient denies chest pain, leg swellings, and shortness of breath.  Troponin is WNL.  Follow-up with cardiology outpatient  discharge for GDMT.        =======================================================================      Chief Complaint: Altered mental status with lightheadedness. History Of Present Illness:  Dianna Andersen is a 74 y.o. female with PMHx of alcohol use disorder, bipolar affective disorder, essential hypertension, hyperlipidemia, chronic obstructive pulmonary disease, GERD who presents to [facility] with altered mental status and near syncope. She was admitted 3 weeks ago for alcohol withdrawal.  Patient's history is reported by herself boyfriend who was in the room at that time. He states that on the morning of admission at 6 AM when he got home from the night shift. He had to look very aggressively for hard to wake up. He states that when she finally answered the door she seemed confused and did not make sense. He also states that he went to the bedroom and when he woke up she was still confused in the morning. He states that he went to store in the morning and when he got back home he found her laying on the floor. She did not lose consciousness. Patient states that she fell lightheaded and fell to the floor without losing consciousness. Her boyfriend states she was incoherent and laying on the kitchen floor. She denied shortness of breath, chest pain, fever, chills, nausea, vomiting, abdominal pains headaches, changes in vision. ED course: EKG demonstrated normal sinus rhythm without any ST changes. She also has some incomplete right bundle branch block. Chest x-ray was consistent with multifocal bilateral secondary to infection. CT head without contrast was unremarkable for acute intracranial process. CT chest with and without contrast demonstrated abnormal airspace opacity in the right middle lobe.   Procalcitonin was WNL, troponin was WNL, TSH and T4 were WNL,    Past Medical History:        Diagnosis Date    Anxiety     COPD (chronic obstructive pulmonary disease) ([facility] GERD (gastroesophageal reflux disease)     Hyperlipidemia     Hypertension     Psychiatric problem     Seizures [address] [address]     Thyroid disease        Past Surgical History:        Procedure Laterality Date    ABDOMEN SURGERY      csection x 2    COLONOSCOPY      EYE SURGERY  2010 approx    cataract removal    INOCENCIA STEROTACTIC LOC BREAST BIOPSY LEFT Left 2012    neg    INOCENCIA STEROTACTIC LOC BREAST BIOPSY RIGHT Right 2019    neg       Medications Prior to Admission:   Prior to Admission medications    Medication Sig Start Date End Date Taking? Authorizing Provider   LORazepam (ATIVAN) 1 MG tablet Take 0.5 tablets by mouth every 6 hours as needed for Anxiety for up to 30 days.  Take total of 0.5mg every 6 hours as needed, continue taper per outpatient provider recommendation Max Daily Amount: 2 mg 2/18/23 3/20/23  Ethan Talley DO   amitriptyline (ELAVIL) 150 MG tablet Take 1 tablet by mouth nightly 3/1/23   Historical Provider, MD   venlafaxine (EFFEXOR XR) 75 MG extended release capsule Take 1 capsule by mouth daily 2/2/23   Historical Provider, MD   aspirin 81 MG chewable tablet Take 1 tablet by mouth daily 2/19/23   Lorene Barbosa DO   busPIRone (BUSPAR) 10 MG tablet Take 1 tablet by mouth 2 times daily 2/18/23 6/18/23  Lorene Liao DO   lisinopril (PRINIVIL;ZESTRIL) 20 MG tablet Take 1 tablet by mouth daily 2/19/23   Lorene Barbosa DO   propranolol (INDERAL) 10 MG tablet Take 1 tablet by mouth 3 times daily 2/18/23   Lorene Barbosa DO   levothyroxine (SYNTHROID) 100 MCG tablet Take 1 tablet by mouth Daily 2/19/23   Lorene Barbosa DO   Magnesium Oxide (MAG-OXIDE) 200 MG TABS Take 1 tablet by mouth in the morning and at bedtime 2/18/23   Ethan Talley DO   Multiple Vitamin (MULTIVITAMIN) TABS tablet Take 1 tablet by mouth daily 6/28/21 7/28/21  Shelton Al PA-C   vitamin B-1 (THIAMINE) 100 MG tablet Take 1 tablet by mouth daily 6/27/21   Shelton Al PA-C   cetirizine (ZYRTEC) 10 MG tablet Take 10 mg by mouth daily Historical Provider, MD   alendronate (FOSAMAX) 70 MG tablet Take 70 mg by mouth every 7 days    Historical Provider, MD   atorvastatin (LIPITOR) 40 MG tablet Take 1 tablet by mouth every evening 8/31/16   Lorene Owens MD   omeprazole (PRILOSEC) 20 MG capsule Take 20 mg by mouth 2 times daily as needed     Historical Provider, MD   fluticasone (FLONASE) 50 MCG/ACT nasal spray 2 sprays by Nasal route daily Indications: Use two sprays in each nostril once daily    Historical Provider, MD       Allergies:  Penicillins    Social History:    The patient currently lives her boyfriend. Tobacco use:   reports that she has quit smoking. Her smoking use included cigarettes. She has a 1.50 pack-year smoking history. She has never used smokeless tobacco.  Alcohol use:   reports that she does not currently use alcohol after a past usage of about 2.0 standard drinks per week. Drug use:  reports current drug use. Drug: Benzodiazepines (Downers/Zannies). Family History:   as follows:      Problem Relation Age of Onset    Heart Disease Mother     High Blood Pressure Mother     High Cholesterol Mother     Esophageal Cancer Mother 47        mets to the breast    Kidney Disease Father     Breast Cancer Neg Hx     Ovarian Cancer Neg Hx        Review of Systems:   Pertinent positives and negatives as noted in the HPI. Otherwise complete ROS negative. Physical Exam:    BP (!) 159/96   Pulse 80   Temp 98 °F (36.7 °C) (Oral)   Resp 18   Ht 5' 5\" (1.651 m)   Wt 149 lb 4 oz (67.7 kg)   LMP  (LMP Unknown)   SpO2 98%   BMI 24.84 kg/m²       General appearance: She seemed calm and cooperative. She was in no apparent distress, appears stated age. She is seemed a little confused occasionally. Eyes:  Pupils equal, round, and reactive to light. Conjunctivae/corneas clear. HENT: Head normal in appearance. External nares normal.  Oral mucosa moist without lesions. Hearing grossly intact. Neck: Supple, with full range of motion. Trachea midline. No gross JVD appreciated. Respiratory:  Normal respiratory effort. Clear to auscultation, bilaterally without rales or wheezes or rhonchi. Cardiovascular: Normal rate, regular rhythm with normal S1/S2 without murmurs. No lower extremity edema. Abdomen: Soft, non-tender, non-distended with normal bowel sounds. Musculoskeletal: No joint swelling or tenderness. Normal tone. No abnormal movements. Skin: Warm and dry. No rashes or lesions. Neurologic:  No focal sensory/motor deficits in the upper and lower extremities. Cranial nerves:  grossly non-focal 2-12. Psychiatric: Alert and oriented, normal insight and thought content. Memory is mildly impaired. Capillary Refill: Brisk,< 3 seconds. Peripheral Pulses: +2 palpable, equal bilaterally. Labs:     Recent Labs     03/06/23  1810   WBC 9.4   HGB 12.4   HCT 38.3        Recent Labs     03/06/23  1810      K 3.8      CO2 22*   BUN 13   CREATININE 0.8   CALCIUM 8.9     No results for input(s): AST, ALT, BILIDIR, BILITOT, ALKPHOS in the last 72 hours. No results for input(s): INR in the last 72 hours. Recent Labs     03/06/23  1810   TROPONINT < 0.010     Recent Labs     03/06/23  2150   PROCAL 0.03      Lab Results   Component Value Date/Time    NITRU NEGATIVE 02/18/2023 02:11 PM    WBCUA NONE SEEN 02/18/2023 02:11 PM    BACTERIA NONE SEEN 02/18/2023 02:11 PM    RBCUA NONE SEEN 02/18/2023 02:11 PM    BLOODU NEGATIVE 02/18/2023 02:11 PM    SPECGRAV 1.007 02/18/2023 02:11 PM    GLUCOSEU NEGATIVE 02/17/2023 06:34 PM         Radiology:     CT Head W/O Contrast   Final Result   No acute intracranial process. This document has been electronically signed by: Tai Lopez. DO Agatha on    03/06/2023 09:29 PM      All CTs at this facility use dose modulation techniques and iterative    reconstructions, and/or weight-based dosing   when appropriate to reduce radiation to a low as reasonably achievable.       CTA CHEST W WO CONTRAST   Final Result   1. No pulmonary embolus. 2. Abnormal airspace opacity in the right middle lobe suggesting segmental    pneumonia in the appropriate clinical setting. This document has been electronically signed by: Jade Snider DO on    03/06/2023 09:27 PM      All CTs at this facility use dose modulation techniques and iterative    reconstructions, and/or weight-based dosing   when appropriate to reduce radiation to a low as reasonably achievable. 3D Post-processing was performed on this study. XR CHEST PORTABLE   Final Result   Multifocal bilateral opacities secondary to infection or edema. This document has been electronically signed by: Jade Snider DO on    03/06/2023 09:37 PM             EKG:  I have reviewed the EKG with the following interpretation: Normal sinus rhythm with incomplete right bundle branch block. PT/OT Eval Status: TBD will be assessed  Diet: Diet NPO  DVT prophylaxis: Lovenox  Code Status: Full Code  Disposition: admit to [facility]    Thank you Alban Zuniga for the opportunity to be involved in this patient's care. Electronically signed by Domenic Vasques DO on 3/7/2023 at 12:04 AM.     Case discussed with Attending, Dr. Dr. Chirag Mcclain

## 2023-03-07 NOTE — ED NOTES
ED to inpatient nurses report    Chief Complaint   Patient presents with    Altered Mental Status      Present to ED from home  LOC: alert and orientated to name, place, date  Vital signs   Vitals:    03/06/23 1743 03/06/23 1805 03/06/23 1922 03/06/23 2033   BP: (!) 154/83 (!) 144/76 138/88    Pulse: 88 86 83 82   Resp: 14 17 17 17   Temp: 97.7 °F (36.5 °C)      TempSrc: Oral      SpO2: 93% (!) 86% 98% 99%   Weight: 155 lb (70.3 kg)      Height: 5' 5\" (1.651 m)         Oxygen Baseline room air    Current needs required 3 L NC Bipap/Cpap No  LDAs:   Peripheral IV 03/06/23 Right Antecubital (Active)   Site Assessment Clean, dry & intact 03/06/23 1951     Mobility: Requires assistance * 2  Pending ED orders: antibiotics  Present condition: stable    C-SSRS Risk of Suicide: No Risk  Swallow Screening    Preferred Language: [place]     Electronically signed by Bernadine Norris RN on 3/6/2023 at 10:01 PM       Bernadine Norris RN  03/06/23 1088

## 2023-03-07 NOTE — ED PROVIDER NOTES
[address] [facility]      EMERGENCY MEDICINE     Pt Name: Monique Miramontes  MRN: [id number]  [place] 1955  Date of evaluation: 3/6/2023  Provider: Vinay Frost MD    CHIEF COMPLAINT       Chief Complaint   Patient presents with    Altered Mental Status     HISTORY OF PRESENT ILLNESS   Monique Miramontes is a pleasant 74 y.o. female who presents to the emergency department from from home, by private vehicle for evaluation of altered mental status. Patient's history is provided by her boyfriend. He states that this morning at 6 AM when he came home from his night shift he had to knock very aggressively in order to wake her up. He states that when she finally answered the door she seemed confused and \"did not make sense \". He states that he went to bed and when he woke up she was still kind of confused. He had run to the store and the patient's sister was unable to get a hold of her. So then he went back home and found her laying on the floor. Patient states that she is unsure how she got on the floor. Patient's boyfriend states that she was awake but incoherent laying on the kitchen floor. He did not notice any blood or injury. He states that she was here approximately 3 weeks ago and was told that she had alcohol withdrawal.  He states she was admitted for a few days and given medication for alcohol withdrawal.  He states that the time they discontinued her Ambien and started her on some other medications. He is unsure if secondary to these medications as the reason that she continues to have altered mental status and falls. He denies any recent fever, vomiting, diarrhea, or cough.     PASTMEDICAL HISTORY     Past Medical History:   Diagnosis Date    Anxiety     COPD (chronic obstructive pulmonary disease) (HCC)     GERD (gastroesophageal reflux disease)     Hyperlipidemia     Hypertension     Psychiatric problem     Seizures ([facility]     Thyroid disease        Patient Active Problem List Diagnosis Code    Seizure ([facility] R56.9    Back pain M54.9    Chronic pain syndrome G89.4    Hyperventilation R06.4    Hypokalemia E87.6    Convulsions ([facility]) R56.9    Bipolar affective disorder ([facility]) F31.9    Essential hypertension I10    Respiratory alkalosis E87.3    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    Benzodiazepine dependence ([facility])  and withdrawal F13.20    Hallucinations R44.3    Restlessness R45.1    Benzodiazepine withdrawal ([facility]) F13.939    Diarrhea R19.7    Sinus tachycardia O45.9    Metabolic acidosis L29.52    Elevated CK R74.8    Hyperlipidemia E78.5    Chronic obstructive pulmonary disease ([facility]) J44.9    Gastroesophageal reflux disease K21.9    Benzodiazepine withdrawal with perceptual disturbance ([facility]) J82.109    Alcohol dependence with withdrawal ([facility] F10.239    Altered mental status R41.82    Alcohol withdrawal syndrome with complication ([facility] L60.885    Acute respiratory failure with hypoxia ([facility] J96.01     SURGICAL HISTORY       Past Surgical History:   Procedure Laterality Date    ABDOMEN SURGERY      csection x 2    COLONOSCOPY      EYE SURGERY  2010 approx    cataract removal    INOCENCIA STEROTACTIC LOC BREAST BIOPSY LEFT Left 2012    neg    INOCENCIA STEROTACTIC LOC BREAST BIOPSY RIGHT Right 2019    neg       CURRENT MEDICATIONS       Current Discharge Medication List        CONTINUE these medications which have NOT CHANGED    Details   LORazepam (ATIVAN) 1 MG tablet Take 0.5 tablets by mouth every 6 hours as needed for Anxiety for up to 30 days.  Take total of 0.5mg every 6 hours as needed, continue taper per outpatient provider recommendation Max Daily Amount: 2 mg  Qty: 30 tablet, Refills: 0    Comments: Adjustment of historical medication  Associated Diagnoses: Anxiety      amitriptyline (ELAVIL) 150 MG tablet Take 1 tablet by mouth nightly      venlafaxine (EFFEXOR XR) 75 MG extended release capsule Take 1 capsule by mouth daily      aspirin 81 MG chewable tablet Take 1 tablet by mouth daily  Qty: 30 tablet, Refills: 3      busPIRone (BUSPAR) 10 MG tablet Take 1 tablet by mouth 2 times daily  Qty: 60 tablet, Refills: 3      lisinopril (PRINIVIL;ZESTRIL) 20 MG tablet Take 1 tablet by mouth daily  Qty: 30 tablet, Refills: 3      propranolol (INDERAL) 10 MG tablet Take 1 tablet by mouth 3 times daily  Qty: 90 tablet, Refills: 3      levothyroxine (SYNTHROID) 100 MCG tablet Take 1 tablet by mouth Daily  Qty: 30 tablet, Refills: 3      Magnesium Oxide (MAG-OXIDE) 200 MG TABS Take 1 tablet by mouth in the morning and at bedtime  Qty: 60 tablet, Refills: 0      Multiple Vitamin (MULTIVITAMIN) TABS tablet Take 1 tablet by mouth daily  Qty: 30 tablet, Refills: 0      vitamin B-1 (THIAMINE) 100 MG tablet Take 1 tablet by mouth daily  Qty: 30 tablet, Refills: 3      cetirizine (ZYRTEC) 10 MG tablet Take 10 mg by mouth daily      alendronate (FOSAMAX) 70 MG tablet Take 70 mg by mouth every 7 days      atorvastatin (LIPITOR) 40 MG tablet Take 1 tablet by mouth every evening  Qty: 30 tablet, Refills: 0      omeprazole (PRILOSEC) 20 MG capsule Take 20 mg by mouth 2 times daily as needed       fluticasone (FLONASE) 50 MCG/ACT nasal spray 2 sprays by Nasal route daily Indications: Use two sprays in each nostril once daily             ALLERGIES     is allergic to penicillins. FAMILY HISTORY     She indicated that her mother is . She indicated that her father is . She indicated that the status of her neg hx is unknown.        SOCIAL HISTORY       Social History     Tobacco Use    Smoking status: Former     Packs/day: 0.50     Years: 3.00     Pack years: 1.50     Types: Cigarettes    Smokeless tobacco: Never   Vaping Use    Vaping Use: Never used   Substance Use Topics    Alcohol use: Not Currently     Alcohol/week: 2.0 standard drinks     Types: 2 Cans of beer per week     Comment: social drinker    Drug use: Yes     Types: Benzodiazepines (Downers/Zannies)       PHYSICAL EXAM       ED Triage Vitals [03/06/23 1743]   BP Temp Temp Source Heart Rate Resp SpO2 Height Weight   (!) 154/83 97.7 °F (36.5 °C) Oral 88 14 93 % 5' 5\" (1.651 m) 155 lb (70.3 kg)       Additional Vital Signs:  Vitals:    03/06/23 2345   BP: (!) 159/96   Pulse: 80   Resp: 18   Temp: 98 °F (36.7 °C)   SpO2: 98%     Physical Exam  Vitals and nursing note reviewed. Exam conducted with a chaperone present. Constitutional:       General: She is not in acute distress. Appearance: She is normal weight. She is not ill-appearing. Comments: Appears older than stated age and is disheveled   HENT:      Head: Normocephalic and atraumatic. Nose: Nose normal. No congestion. Mouth/Throat:      Mouth: Mucous membranes are moist.      Pharynx: Oropharynx is clear. No oropharyngeal exudate or posterior oropharyngeal erythema. Eyes:      Extraocular Movements: Extraocular movements intact. Pupils: Pupils are equal, round, and reactive to light. Cardiovascular:      Rate and Rhythm: Normal rate and regular rhythm. Pulses: Normal pulses. Heart sounds: No murmur heard. No friction rub. Pulmonary:      Effort: Pulmonary effort is normal. No respiratory distress. Breath sounds: Normal breath sounds. No wheezing. Abdominal:      General: Abdomen is flat. There is no distension. Palpations: Abdomen is soft. Tenderness: There is no abdominal tenderness. There is no guarding or rebound. Musculoskeletal:         General: Normal range of motion. Skin:     General: Skin is warm and dry. Capillary Refill: Capillary refill takes less than 2 seconds. Neurological:      General: No focal deficit present. Mental Status: She is alert. She is confused. GCS: GCS eye subscore is 4. GCS verbal subscore is 5. GCS motor subscore is 6. Cranial Nerves: No dysarthria or facial asymmetry. Motor: Motor function is intact. Psychiatric:         Attention and Perception: She is inattentive. Mood and Affect: Mood normal. Affect is labile. Speech: Speech is slurred. Behavior: Behavior normal. Behavior is cooperative. Cognition and Memory: Memory is impaired. FORMAL DIAGNOSTIC RESULTS     RADIOLOGY: Interpretation per the Radiologist below, if available at the time of this note (none if blank):    CT Head W/O Contrast   Final Result   No acute intracranial process. This document has been electronically signed by: Cristel Snider DO on    03/06/2023 09:29 PM      All CTs at this facility use dose modulation techniques and iterative    reconstructions, and/or weight-based dosing   when appropriate to reduce radiation to a low as reasonably achievable. CTA CHEST W WO CONTRAST   Final Result   1. No pulmonary embolus. 2. Abnormal airspace opacity in the right middle lobe suggesting segmental    pneumonia in the appropriate clinical setting. This document has been electronically signed by: Cristel Snider DO on    03/06/2023 09:27 PM      All CTs at this facility use dose modulation techniques and iterative    reconstructions, and/or weight-based dosing   when appropriate to reduce radiation to a low as reasonably achievable. 3D Post-processing was performed on this study. XR CHEST PORTABLE   Final Result   Multifocal bilateral opacities secondary to infection or edema. This document has been electronically signed by: Cristel Snider DO on    03/06/2023 09:37 PM          LABS: (none if blank)  Labs Reviewed   BASIC METABOLIC PANEL W/ REFLEX TO MG FOR LOW K - Abnormal; Notable for the following components:       Result Value    CO2 22 (*)     Glucose 175 (*)     All other components within normal limits   BLOOD GAS, VENOUS - Abnormal; Notable for the following components:    PO2, Mixed 22 (*)     HCO3, Mixed 30 (*)     Base Exc, Mixed 4.0 (*)     All other components within normal limits   COVID-19 & INFLUENZA COMBO   CULTURE, BLOOD 1 CULTURE, BLOOD 1   PNEUMONIA PANEL, MOLECULAR   CULTURE, RESPIRATORY   CBC WITH AUTO DIFFERENTIAL   ANION GAP   OSMOLALITY   GLOMERULAR FILTRATION RATE, ESTIMATED   MAGNESIUM   URINE DRUG SCREEN   ETHANOL   TSH   T4, FREE   TROPONIN   PROCALCITONIN   COMPREHENSIVE METABOLIC PANEL W/ REFLEX TO MG FOR LOW K   CBC WITH AUTO DIFFERENTIAL   CORTISOL TOTAL   CORTISOL, URINE, FREE       (Any cultures that may have been sent were not resulted at the time of this patient visit)    [address] [facility] / ED COURSE:     1) Number and Complexity of Problems            Problem List This Visit:         Chief Complaint   Patient presents with    Altered Mental Status            Differential Diagnosis includes (but not limited to):  Pneumonia, polypharmacy, intracranial bleed, electrolyte abnormality, ACS, infection, intoxication, hypercapnia        Diagnoses Considered but I have low suspicion of:   Meningitis, encephalitis, intra-abdominal infection, UTI             Pertinent Comorbid Conditions:    COPD, alcohol dependence, benzo dependence    2)  Data Reviewed (none if left blank)          My Independent interpretations:     EKG:      Normal sinus rhythm without any ST changes. She has an incomplete right bundle branch block which is unchanged    Imaging: X-ray consistent with either pulmonary edema or multifocal pneumonia. CT head is negative for any intracranial process. CTA does not show any evidence of PE however it is confirming pneumonia. Labs:      Electrolytes are within normal limits. No evidence of leukocytosis. Thyroid levels are normal.  Alcohol level is negative. Drug screen is negative. External Documentation Reviewed:         Previous patient encounter documents & history available on EMR was reviewed              See Formal Diagnostic Results above for the lab and radiology tests and orders.     3)  Treatment and Disposition         ED Reassessment: See ED course Case discussed with consulting clinician: Hospitalist         Shared Decision-Making was performed and disposition discussed with the        Patient/Family and questions answered          Social determinants of health impacting treatment or disposition: None         Code Status: Full      Summary of Patient Presentation:      MDM  Number of Diagnoses or Management Options  Hypoxia  Pneumonia due to infectious organism, unspecified laterality, unspecified part of lung  Diagnosis management comments: 72-year-old female presents emergency room for altered mental status. She had a recent admission that was concerning for benzodiazepine withdrawal and alcohol withdrawal.  There was also significant toxic polypharmacy. Based on the boyfriend's history provided it is concerning that she might be having polypharmacy again however she did have an episode of hypoxia here prior to my evaluation. She is not in any respiratory distress but she does seem mildly obtunded. Will evaluate with CBC, CMP, troponin, TSH, COVID/influenza swab, chest x-ray, alcohol level, urine drug screen. Is tolerating 3 L nasal cannula here without any difficulty. No wheezing heard on exam so I do not feel that she would benefit from a breathing treatment. Boyfriend also indicated that she fell. Will obtain a CT head to rule out any intracranial bleed    /  ED Course as of 03/07/23 0120   Mon Mar 06, 2023   2130 Patient's CT confirms pneumonia. We will give a dose of Rocephin, azithromycin, and Flagyl. I am giving the Flagyl given that she has a history of polypharmacy and I am worried that she may have aspiration pneumonia. Boyfriend does add at this time that she has had some choking episodes and so he is worried about her ability to swallow. Patient still tolerating nasal cannula.   Will admit to the hospitalist. [DD]      ED Course User Index  [DD] Harrison Diaz MD     Vitals Reviewed:    Vitals:    03/06/23 1805 03/06/23 1922 03/06/23 2033 03/06/23 2345   BP: (!) 144/76 138/88  (!) 159/96   Pulse: 86 83 82 80   Resp: 17 17 17 18   Temp:    98 °F (36.7 °C)   TempSrc:    Oral   SpO2: (!) 86% 98% 99% 98%   Weight:    149 lb 4 oz (67.7 kg)   Height:    5' 5\" (1.651 m)       The patient was seen and examined. Appropriate diagnostic testing was performed and results reviewed with the patient. The results of pertinent diagnostic studies and exam findings were discussed. The patients provisional diagnosis and plan of care were discussed with the patient and present family who expressed understanding. Any medications were reviewed and indications and risks of medications were discussed with the patient /family present. Strict verbal and written return precautions, instructions and appropriate follow-up provided to  the patient .      ED Medications administered this visit:  (None if blank)  Medications   azithromycin (ZITHROMAX) 500 mg in sodium chloride 0.9 % 250 mL IVPB ([id number]) (has no administration in time range)   metronidazole (FLAGYL) 500 mg in 0.9% NaCl 100 mL IVPB premix (has no administration in time range)   sodium chloride flush 0.9 % injection 5-40 mL (has no administration in time range)   sodium chloride flush 0.9 % injection 5-40 mL (has no administration in time range)   0.9 % sodium chloride infusion (has no administration in time range)   enoxaparin (LOVENOX) injection 40 mg (has no administration in time range)   ondansetron (ZOFRAN-ODT) disintegrating tablet 4 mg (has no administration in time range)     Or   ondansetron (ZOFRAN) injection 4 mg (has no administration in time range)   polyethylene glycol (GLYCOLAX) packet 17 g (has no administration in time range)   acetaminophen (TYLENOL) tablet 650 mg (has no administration in time range)     Or   acetaminophen (TYLENOL) suppository 650 mg (has no administration in time range)   aspirin chewable tablet 81 mg (has no administration in time range)   atorvastatin (LIPITOR) tablet 40 mg (has no administration in time range)   busPIRone (BUSPAR) tablet 10 mg (has no administration in time range)   fluticasone (FLONASE) 50 MCG/ACT nasal spray 2 spray (has no administration in time range)   levothyroxine (SYNTHROID) tablet 100 mcg (has no administration in time range)   lisinopril (PRINIVIL;ZESTRIL) tablet 20 mg (has no administration in time range)   thiamine tablet 100 mg (has no administration in time range)   PHENobarbital (LUMINAL) injection 130 mg (has no administration in time range)     Or   PHENobarbital (LUMINAL) injection 260 mg (has no administration in time range)     Or   PHENobarbital (LUMINAL) 703.3 mg in sodium chloride 0.9 % 100 mL IVPB (has no administration in time range)   magnesium sulfate 2000 mg in 50 mL IVPB premix (has no administration in time range)   iopamidol (ISOVUE-370) 76 % injection 80 mL (80 mLs IntraVENous Given 3/6/23 2101)   cefTRIAXone (ROCEPHIN) 1,000 mg in sodium chloride 0.9 % 50 mL IVPB (mini-bag) (0 mg IntraVENous Stopped 3/6/23 2223)         PROCEDURES: (None if blank)  Procedures:     CRITICAL CARE: (None if blank)      DISCHARGE PRESCRIPTIONS: (None if blank)  Current Discharge Medication List          FINAL IMPRESSION      1. Pneumonia due to infectious organism, unspecified laterality, unspecified part of lung    2. Hypoxia          DISPOSITION/PLAN   DISPOSITION Admitted 03/06/2023 09:51:36 PM      OUTPATIENT FOLLOW UP THE PATIENT:  No follow-up provider specified.     MD Ally Vallecillo MD  03/07/23 1911

## 2023-03-07 NOTE — CARE COORDINATION
03/07/23 1321   Readmission Assessment   Number of Days since last admission? 8-30 days   Previous Disposition Home with Family   Who is being Interviewed Patient   What was the patient's/caregiver's perception as to why they think they needed to return back to the hospital?   (boyfriend sent in due to [address])   Did you visit your Primary Care Physician after you left the hospital, before you returned this time? No   Why weren't you able to visit your PCP? Did not have an appointment   Did you see a specialist, such as Cardiac, Pulmonary, Orthopedic Physician, etc. after you left the hospital? No   Does the patient report anything that got in the way of taking their medications?  No

## 2023-03-07 NOTE — PROGRESS NOTES
[address] [place] [address]  SPEECH THERAPY  [facility] 5K  Clinical Swallow Evaluation      SLP Individual Minutes  Time In: 6621  Time Out: 3912  Minutes: 18  Timed Code Treatment Minutes: 0 Minutes       Date: 3/7/2023  Patient Name: Ray Weinstein      CSN: [id number]   : 1955  (74 y.o.)  Gender: female   Referring Physician:  Myriam Pham, DO    Diagnosis: Acute respiratory failure with hypoxia ([address]    History of Present Illness/Injury: Patient admitted to [facility] for above dx. Per chart review, \"patient is a 74 y.o. female with PMHx of alcohol use disorder, bipolar affective disorder, essential hypertension, hyperlipidemia, chronic obstructive pulmonary disease, GERD who presents to [address] [place] [address] with altered mental status and near syncope. She was admitted 3 weeks ago for alcohol withdrawal.  Patient's history is reported by herself boyfriend who was in the room at that time. He states that on the morning of admission at 6 AM when he got home from the night shift. He had to look very aggressively for hard to wake up. He states that when she finally answered the door she seemed confused and did not make sense. He also states that he went to the bedroom and when he woke up she was still confused in the morning. He states that he went to store in the morning and when he got back home he found her laying on the floor. She did not lose consciousness. Patient states that she fell lightheaded and fell to the floor without losing consciousness. Her boyfriend states she was incoherent and laying on the kitchen floor. She denied shortness of breath, chest pain, fever, chills, nausea, vomiting, abdominal pains headaches, changes in vision. \"    ST consulted for clinical swallow evaluation for dysphagia management POC.      Past Medical History:   Diagnosis Date    Anxiety     COPD (chronic obstructive pulmonary disease) (HCC)     GERD (gastroesophageal reflux disease)     Hyperlipidemia Hypertension     Psychiatric problem     Seizures ([facility] [address]     Thyroid disease        SUBJECTIVE:  Spoke with RN Ashanti for approval of PO intake for CSE. Upon arrival, patient was sitting in recliner with meal tray in front of her. She reports regular diet, thin liquids at baseline. OBJECTIVE:    Pain:  No pain reported. Current Diet: Regular, thin     Respiratory Status:  Room Air    Behavioral Observation:  Alert    CRANIAL NERVE ASSESSMENT   CN V (Trigeminal) Closes and Opens Mandible WFL    Rotary Jaw Movement WFL      CN VII (Facial) Cheeks Hold Food out of Sulci WFL    Opens, Closes/Seals, Protrudes, Retracts Lips Impaired: Bilateral    General Appearance WFL    Sensation Not Tested      CN X (Vagus - Pharyngeal) Raises Back of Tongue WFL      CN XI (Accessory) Lifts Soft Palate WFL      CN XII (Hypoglossal) Elevates Tongue Up and Back WFL    Protrusion   WFL    Lateralizes Tongue WFL    Sensation Not Tested      Other Observations Dentition Full dentures     Vocal Quality WFL    Cough WFL     Patient Evaluated Using: Thin Liquids, Puree, Soft Solids, Coarse Solids, and Mixed Consistency    Oral Phase:  WFL    Pharyngeal Phase: WFL:  Pharyngeal phase appears WFL but cannot rule out pharyngeal phase deficits from a bedside swallowing evaluation alone. Signs and Symptoms of Laryngeal Penetration/Aspiration: No signs/symptoms of aspiration evident in this evaluation, but cannot rule out silent aspiration. Impressions: Patient presents [facility] with inability to fully discern potential presence of pharyngeal phase deficits without formal instrumentation. Patient demonstrated adequate bolus control, mastication, and manipulation across all trials. No overt s/s or aspiration were noted, but certainly cannot rule out pharyngeal dysfunction at bedside alone. Recommend continuation of regular diet, thin liquids.  No further skilled ST serviced are recommended for dysphagia management. Please re-consult if anything changes. RECOMMENDATIONS/ASSESSMENT:  Instrumental Evaluation: Instrumental evaluation not indicated at this time. Diet Recommendations:  Regular diet, thin liquids   Strategies:  Full Upright Position, Small Bite/Sip, and Alternate Solids and Liquids   Rehabilitation Potential: excellent  Discharge Recommendations:  Home    EDUCATION:  Learner: Patient  Education:  Reviewed results and recommendations of this evaluation, Reviewed diet and strategies, and Reviewed signs, symptoms and risks of aspiration  Evaluation of Education: Verbalizes understanding    PLAN:  No further speech therapy services indicated. PATIENT GOAL:    Return to prior level of function.     [id number] [facility] Student Speech Intern

## 2023-03-07 NOTE — CARE COORDINATION
Case Management Assessment  Initial Evaluation    Date/Time of Evaluation: 3/7/2023 1:22 PM  Assessment Completed by: Nayla Lentz RN    If patient is discharged prior to next notation, then this note serves as note for discharge by case management. Patient Name: Juana Gomes                   YOB: 1955  Diagnosis: Hypoxia [R09.02]  Acute respiratory failure with hypoxia ([facility] [address] [J96.01]  Pneumonia due to infectious organism, unspecified laterality, unspecified part of lung [J18.9]                   Date / Time: 3/6/2023  5:39 PM  Location: [facility]14/014-A     Patient Admission Status: Inpatient   Readmission Risk Low 0-14, Mod 15-19), High > 20: Readmission Risk Score: 12.6    Current PCP: Polo Martin  PCP verified by CM? Yes    Chart Reviewed: Yes      History Provided by: Patient  Patient Orientation: Alert and Oriented, Person, Place, Situation, Self    Patient Cognition: Alert    Hospitalization in the last 30 days (Readmission):  Yes    If yes, Readmission Assessment in CM Navigator will be completed. Advance Directives:      Code Status: Full Code   Patient's Primary Decision Maker is: Legal Next of Kin      Discharge Planning:    Patient lives with: Spouse/Significant Other Type of Home: House  Primary Care Giver: Self  Patient Support Systems include: Children, Spouse/Significant Other   Current Financial resources: Medicaid, Medicare  Current community resources: None  Current services prior to admission: None            Current DME:              Type of Home Care services:  None    ADLS  Prior functional level: Independent in ADLs/IADLs  Current functional level: Independent in ADLs/IADLs    Family can provide assistance at DC: Yes  Would you like Case Management to discuss the discharge plan with any other family members/significant others, and if so, who?  No  Plans to Return to Present Housing: Yes  Other Identified Issues/Barriers to RETURNING to current housing: None  Potential Assistance needed at discharge: N/A            Potential DME:    Patient expects to discharge to: [facility] for transportation at discharge: Family    Financial    Payor: Elliot Rucker / Plan: Yaquelin Copas / Product Type: *No Product type* /     Does insurance require precert for SNF: Yes    Potential assistance Purchasing Medications: No  Meds-to-Beds request:        [address]  [address]  [address] [facility]  Phone: [phone] Fax: [phone]      Notes:    Factors facilitating achievement of predicted outcomes: Family support, Motivated, Cooperative, and Pleasant    Barriers to discharge: medical stability    Additional Case Management Notes: Patient presents with altered mental status. She was here recently for the same problem. Addiction Services consult, IV Zithromax  and IV Flagyl x 1 dose, Rocephin, Lovenox, DM management, folic acid, multivitamin, and thiamine scheduled, prn Tylenol and Zofran, detox protocol with phenobarbital, NPO, oxygen, acapella, incentive spirometry, orthostatic pressure and pulse, PT/OT/ST, telemetry, up as tolerated. Procedure: N/A    The Plan for Transition of Care is related to the following treatment goals of Hypoxia [R09.02]  Acute respiratory failure with hypoxia ([facility] [address] [J96.01]  Pneumonia due to infectious organism, unspecified laterality, unspecified part of lung [J18.9]    Patient Goals/Plan/Treatment Preferences: Met with Monique. She resides with her boyfriend. Insurance and PCP verified. She is able to afford her medications and denies a need for DME or services at discharge. Nicolas Raza states with her recent discharge she did not have a follow-up with Dr. Payton Rice because her check-up is on 4/12/2023 at 1:30 added to discharge instructions. Transportation/Food Security/Housekeeping Addressed: No issues identified.      Antonio Coe Judith Garber RN  Case Management Department

## 2023-03-07 NOTE — PLAN OF CARE
Problem: Pain  Goal: Verbalizes/displays adequate comfort level or baseline comfort level  3/7/2023 1006 by Rafia Rosario RN  Outcome: Progressing  Flowsheets (Taken 3/7/2023 1006)  Verbalizes/displays adequate comfort level or baseline comfort level:   Encourage patient to monitor pain and request assistance   Administer analgesics based on type and severity of pain and evaluate response   Assess pain using appropriate pain scale   Implement non-pharmacological measures as appropriate and evaluate response     Problem: Safety - Adult  Goal: Free from fall injury  3/7/2023 1006 by Rafia Rosario RN  Outcome: Progressing  Flowsheets (Taken 3/7/2023 1006)  Free From Fall Injury: Instruct family/caregiver on patient safety     Problem: ABCDS Injury Assessment  Goal: Absence of physical injury  3/7/2023 1006 by Rafia Rosario RN  Outcome: Progressing  Flowsheets (Taken 3/7/2023 1006)  Absence of Physical Injury: Implement safety measures based on patient assessment     Problem: Respiratory - Adult  Goal: Achieves optimal ventilation and oxygenation  Outcome: Progressing  Flowsheets (Taken 3/7/2023 1006)  Achieves optimal ventilation and oxygenation:   Assess for changes in respiratory status   Position to facilitate oxygenation and minimize respiratory effort   Assess for changes in mentation and behavior     Problem: Musculoskeletal - Adult  Goal: Return ADL status to a safe level of function  Outcome: Progressing  Flowsheets (Taken 3/7/2023 1006)  Return ADL Status to a Safe Level of Function:   Administer medication as ordered   Assess activities of daily living deficits and provide assistive devices as needed   Obtain physical therapy/occupational therapy consults as needed     Problem: Discharge Planning  Goal: Discharge to home or other facility with appropriate resources  Outcome: Progressing  Flowsheets (Taken 3/7/2023 1006)  Discharge to home or other facility with appropriate resources: Identify barriers to discharge with patient and caregiver    Care plan reviewed with patient and family. Patient and family verbalize understanding of the plan of care and contribute to goal setting.

## 2023-03-07 NOTE — PLAN OF CARE
Problem: Pain  Goal: Verbalizes/displays adequate comfort level or baseline comfort level  Outcome: Progressing  Flowsheets (Taken 3/7/2023 0304)  Verbalizes/displays adequate comfort level or baseline comfort level:   Encourage patient to monitor pain and request assistance   Administer analgesics based on type and severity of pain and evaluate response   Assess pain using appropriate pain scale   Implement non-pharmacological measures as appropriate and evaluate response     Problem: Safety - Adult  Goal: Free from fall injury  Outcome: Progressing  Flowsheets (Taken 3/7/2023 0304)  Free From Fall Injury: Instruct family/caregiver on patient safety     Problem: ABCDS Injury Assessment  Goal: Absence of physical injury  Outcome: Progressing  Flowsheets  Taken 3/7/2023 0304  Absence of Physical Injury: Implement safety measures based on patient assessment  Taken 3/7/2023 0002  Absence of Physical Injury: Implement safety measures based on patient assessment

## 2023-03-07 NOTE — PROGRESS NOTES
[address]  INPATIENT OCCUPATIONAL THERAPY  [facility] ONC MED 5K  EVALUATION    Time:   Time In: 08  Time Out: 8640  Timed Code Treatment Minutes: 23 Minutes  Minutes: 32          Date: 3/7/2023  Patient Name: Jagdeep Gaming,   Gender: female      MRN: [id number]  : 1955  (74 y.o.)  Referring Practitioner: Kevin Guerrero DO  Diagnosis: acute respiratory failure with hypoxia  Additional Pertinent Hx: per chart review; Jagdeep Gaming is a pleasant 74 y.o. female who presents to the emergency department from from home, by private vehicle for evaluation of altered mental status. Patient's history is provided by her boyfriend. He states that this morning at 6 AM when he came home from his night shift he had to knock very aggressively in order to wake her up. He states that when she finally answered the door she seemed confused and \"did not make sense \". He states that he went to bed and when he woke up she was still kind of confused. He had run to the store and the patient's sister was unable to get a hold of her. So then he went back home and found her laying on the floor. Patient states that she is unsure how she got on the floor. Patient's boyfriend states that she was awake but incoherent laying on the kitchen floor. He did not notice any blood or injury. He states that she was here approximately 3 weeks ago and was told that she had alcohol withdrawal.  He states she was admitted for a few days and given medication for alcohol withdrawal.  He states that the time they discontinued her Ambien and started her on some other medications. He is unsure if secondary to these medications as the reason that she continues to have altered mental status and falls. He denies any recent fever, vomiting, diarrhea, or cough.     Restrictions/Precautions:  Restrictions/Precautions: Fall Risk, General Precautions    Subjective  Chart Reviewed: Yes, Orders, Progress Notes, History and Physical  Patient assessed for rehabilitation services?: Yes  Family / Caregiver Present: No    Subjective: RN approved session, patient side lying in bed upon OT arrival and agreeable to eval. patient A & O x 3, however appeared confused. difficulty with recall. Pain: reported pain in R lateral cervical region; did not rate or affect function t/o eval    Vitals: Vitals not assessed per clinical judgement, see nursing flowsheet  Nurse checked vitals prior to session    Social/Functional History:  Lives With: Significant other  Type of Home: House  Home Layout: One level  Home Access: Level entry   Bathroom Shower/Tub: Tub/Shower unit  Bathroom Toilet: Standard  Bathroom Accessibility: Accessible    Receives Help From: Family  ADL Assistance: Independent  Homemaking Assistance: Independent  Ambulation Assistance: Independent  Transfer Assistance: Independent    Active : Yes  Occupation: On disability  Additional Comments: patient used no AD prior to admit. patient's significant other works outside the home on swing shift. VISION:WFL    HEARING:  WFL    COGNITION: Decreased Insight, Decreased Problem Solving, Impaired Attention, and Difficulty Following Commands    RANGE OF MOTION:  Bilateral Upper Extremity:  WFL    STRENGTH:  Bilateral Upper Extremity:  shldr 4/5 elbow flex 4/5 ext 4-/5  (F)    SENSATION:   WFL    ADL:   Grooming: Stand By Assistance. To stand at sink with on AD to complete oral care with mouth wash and wash hands  Footwear Management: Stand By Assistance. To doff/don slipper socks  Toilet Transfer: Stand By Assistance and Contact Guard Assistance. Cues for sequencing . BALANCE:  Sitting Balance:  Supervision. Standing Balance: Stand By Assistance, [employer]. With no AD ; appeared unsteady at times    BED MOBILITY:  Supine to Sit: Stand By Assistance cues for sequencing.  Increased time to complete due to decreased attention    TRANSFERS:  Sit to Stand:  Stand By Assistance. FUNCTIONAL MOBILITY:  Assistive Device: None  Assist Level:  Contact Guard Assistance. Distance: To and from bathroom  Appeared unsteady and would benefit from an AD. Activity Tolerance:  Patient tolerance of  treatment: Good treatment tolerance      Assessment:  Assessment: patient demo overall de-conditioning and weakness secondary to acute respiratory failure with hypoxia with patient having falls at home. patient would benefit from continued, skilled OT to progress toward PLOF, decrease caregiver burden and increase occupational performance. Performance deficits / Impairments: Decreased functional mobility , Decreased safe awareness, Decreased ADL status, Decreased endurance, Decreased strength, Decreased balance, Decreased cognition  Prognosis: Good  REQUIRES OT FOLLOW-UP: Yes  Decision Making: Low Complexity    Treatment Initiated: Treatment and education initiated within context of evaluation. Evaluation time included review of current medical information, gathering information related to past medical, social and functional history, completion of standardized testing, formal and informal observation of tasks, assessment of data and development of plan of care and goals. Treatment time included skilled education and facilitation of tasks to increase safety and independence with ADL's for improved functional independence and quality of life. Discharge Recommendations:  Continue to assess pending progress, Patient would benefit from continued therapy after discharge, Home with assist PRN, Home with Home health OT    Patient Education:     Patient Education  Education Given To: Patient  Education Provided: Role of Therapy, ADL Adaptive Strategies, Fall Prevention Strategies, Plan of Care, Transfer Training, Precautions  Barriers to Learning: Cognition    Equipment Recommendations:  Equipment Needed: No    Plan:  Times Per Week: 3-5x  Times Per Day:  Once a day  Current Treatment Recommendations: Strengthening, Balance training, Functional mobility training, Endurance training, Safety education & training, Neuromuscular re-education, Patient/Caregiver education & training, Equipment evaluation, education, & procurement, Self-Care / ADL. See long-term goal time frame for expected duration of plan of care. If no long-term goals established, a short length of stay is anticipated. Goals:  Patient goals : return home [facility]  Short Term Goals  Time Frame for Short Term Goals: by discharge  Short Term Goal 1: patient will tolerate 6 min functional standing with (S) to increase activity tolerance for ADL routine. Short Term Goal 2: patient will functionally ambulate house hold distances with (S) and least restrictive device. Short Term Goal 3: patient will complete ADL routine with MOD I and 0-1 cues for safety and sequencing. Short Term Goal 4: patient will participate in moderate resistive UB exer to increase UB strength for functional transfers. Following session, patient left in safe position with all fall risk precautions in place.

## 2023-03-07 NOTE — PROGRESS NOTES
[facility]  INPATIENT PHYSICAL THERAPY  EVALUATION  [facility] ONC MED 5K - 5K-14/014-A    Time In: 1005  Time Out: 1029  Timed Code Treatment Minutes: 15 Minutes  Minutes: 24          Date: 3/7/2023  Patient Name: Davide Tong,  Gender:  female        MRN: [id number]  : 1955  (74 y.o.)      Referring Practitioner: Chano Boggs DO  Diagnosis: Hypoxia  Additional Pertinent Hx: Davide Tong is a 74 y.o. female with PMHx of alcohol use disorder, bipolar affective disorder, essential hypertension, hyperlipidemia, chronic obstructive pulmonary disease, GERD who presents to [facility] with altered mental status and near syncope. She was admitted 3 weeks ago for alcohol withdrawal.  Patient's history is reported by herself boyfriend who was in the room at that time. He states that on the morning of admission at 6 AM when he got home from the night shift. He had to look very aggressively for hard to wake up. He states that when she finally answered the door she seemed confused and did not make sense. He also states that he went to the bedroom and when he woke up she was still confused in the morning. He states that he went to store in the morning and when he got back home he found her laying on the floor. She did not lose consciousness. Patient states that she fell lightheaded and fell to the floor without losing consciousness. Her boyfriend states she was incoherent and laying on the kitchen floor. Restrictions/Precautions:  Restrictions/Precautions: Fall Risk, General Precautions    Subjective:  Chart Reviewed: Yes  Patient assessed for rehabilitation services?: Yes  Subjective: Pt resting in recliner having just finished with Speech Therapy.     General:     Vision: Within Functional Limits  Hearing: Within functional limits       Pain: denies      Vitals: Vitals not assessed per clinical judgement, see nursing flowsheet    Social/Functional History:    Lives With: Significant other  Type of Home: House  Home Layout: One level  Home Access: Level entry     Bathroom Shower/Tub: Tub/Shower unit  Bathroom Toilet: Standard  Bathroom Accessibility: Accessible    Receives Help From: Family  ADL Assistance: Independent  Homemaking Assistance: Independent  Ambulation Assistance: Independent  Transfer Assistance: Independent    Active : Yes  Occupation: On disability  Additional Comments: patient used no AD prior to admit. patient's significant other works outside the home on swing shift. OBJECTIVE:  Range of Motion:  Bilateral Lower Extremity: WFL    Strength:  Bilateral Lower Extremity: grossly 4- to 4/5    Balance:  Static Sitting Balance:  Modified Independent  Dynamic Sitting Balance: Supervision  Static Standing Balance: Stand By Assistance  Dynamic Standing Balance: Stand By Assistance, Contact Guard Assistance    Bed Mobility:  Not Tested    Transfers:  Sit to Stand: Stand By Assistance  Stand to Sit:Stand By Assistance    Ambulation:  Stand By Assistance, Contact Guard Assistance  Distance: 100 ft  Surface: Level Tile  Device:No Device  Gait Deviations:  Decreased Step Length Bilaterally, Decreased Gait Speed, Decreased Heel Strike Bilaterally, Narrow Base of Support, Mild Path Deviations, and requires direction finding for how to return to room    Exercise:  Patient was guided in 1 set(s) 10 reps of exercise to both lower extremities. Ankle pumps, Heelslides, Hip abduction/adduction, Seated marches, and Long arc quads. Exercises were completed for increased independence with functional mobility. Functional Outcome Measures: Completed  AM-PAC Inpatient Mobility without Stair Climbing Raw Score : 16  AM-PAC Inpatient without Stair Climbing T-Scale Score : 45.54    ASSESSMENT:  Activity Tolerance:  Patient tolerance of  treatment: good. Treatment Initiated: Treatment and education initiated within context of evaluation.   Evaluation time included review of current medical information, gathering information related to past medical, social and functional history, completion of standardized testing, formal and informal observation of tasks, assessment of data and development of plan of care and goals. Treatment time included skilled education and facilitation of tasks to increase safety and independence with functional mobility for improved independence and quality of life. Assessment: Body Structures, Functions, Activity Limitations Requiring Skilled Therapeutic Intervention: Decreased functional mobility , Decreased strength, Decreased endurance, Decreased balance  Assessment: Pt is a pleasant 75 yo female that is mildly deconditioned and having minor LOBs with mobility today. Pt was generally Independent for mobility in [facility] and is at [address] today. Pt would benefit from continued skilled PT to address strengthening, balance, bed mobility, endurance building, and functional mobility training. Therapy Prognosis: Good    Requires PT Follow-Up: Yes    Discharge Recommendations:  Discharge Recommendations: Continue to assess pending progress, Home with Home health PT, Outpatient PT, Patient would benefit from continued therapy after discharge    Patient Education:      . Patient Education  Education Given To: Patient  Education Provided: Role of Therapy, Plan of Care, Home Exercise Program  Education Method: Demonstration, Verbal  Barriers to Learning: None  Education Outcome: Verbalized understanding, Demonstrated understanding       Equipment Recommendations:   Other: monitor for needs    Plan:  Current Treatment Recommendations: Strengthening, Balance training, Endurance training, Functional mobility training, Gait training, Transfer training, Home exercise program, Patient/Caregiver education & training, Safety education & training, Therapeutic activities  General Plan:  (5x GM)    Goals:  Patient Goals : go home  Short Term Goals  Time Frame for Short Term Goals: at discharge  Short Term Goal 1: Pt to be Mod I for supine <> sit to get in/out of bed  Short Term Goal 2: Pt to be Mod I for sit <> stand to get up to ambulate  Short Term Goal 3: Pt to ambulate >150 ft with no AD with Supervision for household and community distances  Long Term Goals  Time Frame for Long Term Goals : not set due to short ELOS    Following session, patient left in safe position with all fall risk precautions in place. Sweetie Jacobson.  [facility], [facility]

## 2023-03-07 NOTE — PROGRESS NOTES
Advanced Directives Consult: Pt wanted to look at the document and think more about it. She will have her nurse call the  whenever she is ready to have the document completed. I dropped off the POA document with her. She was dealing with acute respiratory failure. She wanted prayer to cope and heal. She was encouraged and anointed.     03/07/23 1124   Encounter Summary   Encounter Overview/Reason  Initial Encounter   Service Provided For: Patient   Referral/Consult From: [facility] Family members   Last Encounter  03/07/23  (Anointed)   Complexity of Encounter Low   Begin Time 0740   End Time  0747   Total Time Calculated 7 min   Spiritual/Emotional needs   Type Spiritual Support   Rituals, Rites and Sacraments   Type Anointing   Advance Care Planning   Type ACP conversation   Assessment/Intervention/Outcome   Assessment Hopeful   Intervention Empowerment

## 2023-03-07 NOTE — ED NOTES
Inpatient Provider in pt room and stated that he wanted all antibiotics held for the time being because he would like a sputum culture.      Jocelynn Chanel RN  03/06/23 7166

## 2023-03-07 NOTE — PROGRESS NOTES
Progress Note    Patient:  Linda Garcia    Unit/Bed:5K-14/014-A  YOB: 1955  MRN: [id number]   Acct: [de-identified]   Admit date: 3/6/2023      Principal Problem:    Acute respiratory failure with hypoxia [facility])  Resolved Problems:    * No resolved hospital problems. *    Disposition continued inpatient care, if her mentation improves she may be eligible for discharge home, I am uncertain what her baseline is hopefully if family presents to bedside and is able to corroborate her baseline mental status she may be eligible for discharge in 24 to 48 hours, of note on chart review she has had recent admission for polypharmacy      Assessment and Plan:  Right middle lobe pneumonia, will continue Rocephin for now and likely add azithromycin for typical and atypical coverage the patient did receive Flagyl and Rocephin in ER. Metabolic cephalopathy with chronic benzodiazepine dependence, I will restart the patient's Ativan orally here this morning on physical survey she states she does take Ativan regularly 4 times daily metabolic encephalopathy documented may be multifactorial I suspect she is near her baseline mentally, I would like to cooperate with family at bedside or by observing an additional day  Community-acquired pneumonia I have a low index suspicion for aspiration pneumonia I will continue Rocephin and likely add azithromycin  Chronic benzodiazepine dependence continue Ativan elevated at a lower dose to prevent withdrawals  Chronic systolic heart failure no signs of fluid overload at this time no hypoxia        Patient Seen, Chart, Consults notes, Labs, Radiology studies reviewed. Subjective: Day 1 of stay with    All other ROS negative except noted in HPI    Past, Family, Social History unchanged from admission. Diet:  ADULT DIET;  Regular    Medications:  Scheduled Meds:   folic acid  1 mg Oral Daily    multivitamin  1 tablet Oral Daily    cefTRIAXone (ROCEPHIN) IV  1,000 mg IntraVENous Q24H    sodium chloride flush  5-40 mL IntraVENous 2 times per day    enoxaparin  40 mg SubCUTAneous Daily    aspirin  81 mg Oral Daily    atorvastatin  40 mg Oral QPM    busPIRone  10 mg Oral BID    fluticasone  2 spray Nasal Daily    levothyroxine  100 mcg Oral Daily    lisinopril  20 mg Oral Daily    thiamine  100 mg Oral Daily     Continuous Infusions:   dextrose      sodium chloride Stopped (03/07/23 0206)     PRN Meds:glucose, dextrose bolus **OR** dextrose bolus, glucagon (rDNA), dextrose, acetaminophen **OR** acetaminophen, LORazepam, sodium chloride flush, sodium chloride, ondansetron **OR** ondansetron, polyethylene glycol, PHENobarbital **OR** PHENobarbital **OR** PHENobarbital (LUMINAL) IVPB    Objective:  CBC:   Recent Labs     03/06/23  1810 03/07/23  0549   WBC 9.4 7.1   HGB 12.4 12.9    220     BMP:    Recent Labs     03/06/23  1810 03/07/23  0549    142   K 3.8 3.8    105   CO2 22* 24   BUN 13 9   CREATININE 0.8 0.7   GLUCOSE 175* 118*     Calcium:  Recent Labs     03/07/23  0549   CALCIUM 8.8     Ionized Calcium:No results for input(s): IONCA in the last 72 hours. Magnesium:  Recent Labs     03/06/23  1810   MG 1.6     Phosphorus:No results for input(s): PHOS in the last 72 hours. BNP:No results for input(s): BNP in the last 72 hours. Glucose:  Recent Labs     03/07/23  0752   POCGLU 117*     HgbA1C: No results for input(s): LABA1C in the last 72 hours. INR: No results for input(s): INR in the last 72 hours. Hepatic:   Recent Labs     03/07/23  0549   ALKPHOS 126   ALT 33   AST 33   PROT 6.4   BILITOT 0.3   LABALBU 3.8     Amylase and Lipase:No results for input(s): LACTA, AMYLASE in the last 72 hours. Lactic Acid: No results for input(s): LACTA in the last 72 hours. Troponin:   Recent Labs     03/06/23  1810   TROPONINT < 0.010     BNP: No results for input(s): BNP in the last 72 hours.   Lipids: No results for input(s): CHOL, TRIG, HDL, LDL, [address] in the last 72 hours. ABGs:   Lab Results   Component Value Date/Time    PH 7.48 08/22/2016 10:32 PM    PCO2 25 08/22/2016 10:32 PM    PO2 101 08/22/2016 10:32 PM    HCO3 19 08/22/2016 10:32 PM    O2SAT 98 08/22/2016 10:32 PM           Radiology reports as per the Radiologist  Radiology: CTA HEAD W WO CONTRAST    Result Date: 2/16/2023  CT angiography head with contrast. 3D Postprocessing. Comparison: CT/SR - CT HEAD WO CONTRAST - 02/16/2023 08:16 PM EST Findings: Intracranial arteries are patent. No aneurysm, dissection, or occlusion. No abnormal intracranial enhancement. No intracranial mass, midline shift, hydrocephalus, abnormal contrast enhancement, or acute hemorrhage. No skull fracture. No high-grade intracranial artery stenosis. This document has been electronically signed by: Heather Stewart DO on 02/16/2023 08:51 PM All CTs at this facility use dose modulation techniques and iterative reconstructions, and/or weight-based dosing when appropriate to reduce radiation to a low as reasonably achievable. 3D Post-processing was performed on this study. CT Head W/O Contrast    Result Date: 3/6/2023  CT HEAD WITHOUT CONTRAST Comparison: CT/SR - CT HEAD WO CONTRAST - 02/16/2023 08:16 PM EST Findings: No acute intracranial hemorrhage, extra-axial fluid collection, hydrocephalus or midline shift. Stable generalized parenchymal atrophy which appears advanced for age. There are periventricular and subcortical white matter hypodensities which are nonspecific but most likely related to microangiopathic gliosis. No sinus or mastoid fluid. Visualized orbits: No acute abnormalities. There is no acute fracture. No acute intracranial process. This document has been electronically signed by: Mo Armendariz.  DO Agatha on 03/06/2023 09:29 PM All CTs at this facility use dose modulation techniques and iterative reconstructions, and/or weight-based dosing when appropriate to reduce radiation to a low as reasonably achievable. CT HEAD WO CONTRAST    Result Date: 2/16/2023  CT head without contrast Comparison: CT - CT HEAD WO CONTR - 08/22/2016 06:39 PM EDT Findings: No intra-axial mass, midline shift, hydrocephalus, or acute hemorrhage. Mild cerebral atrophy. Left posterior septal deviation with a left septal spur. The visualized paranasal sinuses and mastoid air cells are normal. The orbits are unremarkable. There is no acute fracture. 1. No acute intracranial findings This document has been electronically signed by: Verena Pérez DO on 02/16/2023 08:48 PM All CTs at this facility use dose modulation techniques and iterative reconstructions, and/or weight-based dosing when appropriate to reduce radiation to a low as reasonably achievable. CTA NECK W WO CONTRAST    Result Date: 2/16/2023  CT angiography neck with contrast. 3D Postprocessing. Comparison: CT/SR - CTA HEAD W WO CONTRAST - 02/16/2023 08:16 PM EST Findings: Aortic arch and cervical great vessels are patent. Visualized intracranial arteries are patent. No aneurysm, dissection, or occlusion. Thyroid gland unremarkable. No cervical mass or fluid collection. Normal carotid bifurcation. No significant carotid atherosclerosis. Patent bilateral cervical internal carotid arteries. Mild bilateral cavernous ICA vascular calcifications. Mild bilateral upper lobe peripherally interstitial changes. No acute fracture. No high-grade cervical ICA stenosis. This document has been electronically signed by: Verena Pérez DO on 02/16/2023 09:03 PM All CTs at this facility use dose modulation techniques and iterative reconstructions, and/or weight-based dosing when appropriate to reduce radiation to a low as reasonably achievable. Carotid stenosis and measurements are in accordance with NASCET criteria. 3D Post-processing was performed on this study. CTA CHEST W WO CONTRAST    Result Date: 3/6/2023  CT ANGIOGRAPHY CHEST WITH CONTRAST.  3D POSTPROCESSING. Comparison: CT - CTA CHEST - 05/27/2016 07:47 PM EDT Findings: The heart is enlarged. RV/LV ratio is normal. Unremarkable thoracic aorta and great vessels. No aneurysm or dissection. No thyromegaly or lymphadenopathy. There is segmental airspace opacity in the right middle lobe. There is bilateral lower lobe atelectasis. No pleural effusion or pneumothorax. Upper abdomen: No acute abnormalities. Osseous structures: No acute abnormalities. 1. No pulmonary embolus. 2. Abnormal airspace opacity in the right middle lobe suggesting segmental pneumonia in the appropriate clinical setting. This document has been electronically signed by: Peyton Snider DO on 03/06/2023 09:27 PM All CTs at this facility use dose modulation techniques and iterative reconstructions, and/or weight-based dosing when appropriate to reduce radiation to a low as reasonably achievable. 3D Post-processing was performed on this study. XR CHEST PORTABLE    Result Date: 3/6/2023  1 view chest x-ray Comparison: CR/SR - XR CHEST PORTABLE - 11/23/2022 06:28 PM EST Findings: There are bilateral perihilar and bilateral lower lobe opacities. No pneumothorax. Heart size is accentuated by portable technique and suboptimal inspiratory effort. Mediastinal contours and visualized bony thorax appear intact. Multifocal bilateral opacities secondary to infection or edema. This document has been electronically signed by: Peyton Snider DO on 03/06/2023 09:37 PM        Physical Exam:  Vitals: BP (!) 157/77   Pulse 97   Temp 97.8 °F (36.6 °C) (Oral)   Resp 18   Ht 5' 5\" (1.651 m)   Wt 149 lb 4 oz (67.7 kg)   LMP  (LMP Unknown)   SpO2 99%   BMI 24.84 kg/m²   24 hour intake/output:  Intake/Output Summary (Last 24 hours) at 3/7/2023 1251  Last data filed at 3/7/2023 0603  Gross per 24 hour   Intake 477.86 ml   Output 300 ml   Net 177.86 ml     Last 3 weights:   Wt Readings from Last 3 Encounters:   03/06/23 149 lb 4 oz (67.7 kg) 02/18/23 147 lb 11.3 oz (67 kg)   11/23/22 139 lb (63 kg)       General appearance - alert, awake appears to be in no acute distress bitemporal prominence  HEENT: Atraumatic normocephalic, no JVD. Trachea midline.    Chest - Bilateral air entry, no wheezes, crackles or rhonchi  Cardiovascular - S1S2 RRR, no murmurs or gallops  Abdomen - Soft non tender non distended, normoactive bowel sounds   Neurological - non focal, no neurological deficits noted  Integumentary - Skin color, texture, turgor normal. No Rashes or lesions  Musculoskeletal -Full ROM, No clubbing or cyanosis  Extremities: No peripheral edema, nonspecific ecchymoses of bilateral forearms    DVT prophylaxis: [x] Lovenox                                 [] SCDs                                 [] SQ Heparin                                 [] Encourage ambulation           [] Already on Anticoagulation               Electronically signed by Cayden Peña MD on 3/7/2023 at 12:51 PM    [facility]

## 2023-03-07 NOTE — CONSULTS
SBIRT screening completed per trauma protocol. SBIRT Findings are Negative. Patient denies alcohol and/or drug use. Also denies depressive symptoms. Brief Intervention and Referral to Treatment Summary N/A    Patient was provided PHQ-9, AUDIT-C and DAST Screening:      PHQ-9 Score:  Negative  AUDIT-C Score:  Negative  DAST Score:   Negative    Patients substance use is considered-Negative    Low Risk/Healthy  Moderate Risk  Harmful  Dependent    Patients depression is considered:-Negative    Minimal  Mild   Moderate  Moderately Severe  Severe    Brief Education Was Provided N/A    Patient was receptive  Patient was not receptive      Brief Intervention Is Provided (Only for AUDIT-C or DAST) N/A    Patient reports readiness to decrease and/or stop use and a plan was discussed   Patient denies readiness to decrease and/or stop use and a plan was not discussed      Recommendations/Referrals for Brief and/or Specialized Treatment Provided to Patient  N/A  Pt informs that she is prescribed Ativan, but denies elicit drug use.

## 2023-03-08 LAB
ANION GAP SERPL CALC-SCNC: 12 MEQ/L (ref 8–16)
BASOPHILS ABSOLUTE: 0 THOU/MM3 (ref 0–0.1)
BASOPHILS NFR BLD AUTO: 0.5 %
BUN SERPL-MCNC: 12 MG/DL (ref 7–22)
CALCIUM SERPL-MCNC: 9.1 MG/DL (ref 8.5–10.5)
CHLORIDE SERPL-SCNC: 105 MEQ/L (ref 98–111)
CO2 SERPL-SCNC: 25 MEQ/L (ref 23–33)
CREAT SERPL-MCNC: 0.8 MG/DL (ref 0.4–1.2)
DEPRECATED RDW RBC AUTO: 43.1 FL (ref 35–45)
EOSINOPHIL NFR BLD AUTO: 2 %
EOSINOPHILS ABSOLUTE: 0.1 THOU/MM3 (ref 0–0.4)
ERYTHROCYTE [DISTWIDTH] IN BLOOD BY AUTOMATED COUNT: 12.8 % (ref 11.5–14.5)
GFR SERPL CREATININE-BSD FRML MDRD: > 60 ML/MIN/1.73M2
GLUCOSE SERPL-MCNC: 184 MG/DL (ref 70–108)
HCT VFR BLD AUTO: 40.1 % (ref 37–47)
HGB BLD-MCNC: 13 GM/DL (ref 12–16)
IMM GRANULOCYTES # BLD AUTO: 0.03 THOU/MM3 (ref 0–0.07)
IMM GRANULOCYTES NFR BLD AUTO: 0.5 %
LYMPHOCYTES ABSOLUTE: 1.3 THOU/MM3 (ref 1–4.8)
LYMPHOCYTES NFR BLD AUTO: 22.6 %
MCH RBC QN AUTO: 29.3 PG (ref 26–33)
MCHC RBC AUTO-ENTMCNC: 32.4 GM/DL (ref 32.2–35.5)
MCV RBC AUTO: 90.5 FL (ref 81–99)
MONOCYTES ABSOLUTE: 0.4 THOU/MM3 (ref 0.4–1.3)
MONOCYTES NFR BLD AUTO: 7.1 %
NEUTROPHILS NFR BLD AUTO: 67.3 %
NRBC BLD AUTO-RTO: 0 /100 WBC
PLATELET # BLD AUTO: 219 THOU/MM3 (ref 130–400)
PMV BLD AUTO: 9.6 FL (ref 9.4–12.4)
POTASSIUM SERPL-SCNC: 4 MEQ/L (ref 3.5–5.2)
RBC # BLD AUTO: 4.43 MILL/MM3 (ref 4.2–5.4)
SEGMENTED NEUTROPHILS ABSOLUTE COUNT: 4 THOU/MM3 (ref 1.8–7.7)
SODIUM SERPL-SCNC: 142 MEQ/L (ref 135–145)
WBC # BLD AUTO: 5.9 THOU/MM3 (ref 4.8–10.8)

## 2023-03-08 PROCEDURE — 2500000003 HC RX 250 WO HCPCS

## 2023-03-08 PROCEDURE — 97535 SELF CARE MNGMENT TRAINING: CPT

## 2023-03-08 PROCEDURE — 6370000000 HC RX 637 (ALT 250 FOR IP)

## 2023-03-08 PROCEDURE — 97110 THERAPEUTIC EXERCISES: CPT

## 2023-03-08 PROCEDURE — 2580000003 HC RX 258

## 2023-03-08 PROCEDURE — 6360000002 HC RX W HCPCS

## 2023-03-08 PROCEDURE — 1200000003 HC TELEMETRY R&B

## 2023-03-08 PROCEDURE — 97530 THERAPEUTIC ACTIVITIES: CPT

## 2023-03-08 PROCEDURE — 87899 AGENT NOS ASSAY W/OPTIC: CPT

## 2023-03-08 PROCEDURE — 94669 MECHANICAL CHEST WALL OSCILL: CPT

## 2023-03-08 PROCEDURE — 6370000000 HC RX 637 (ALT 250 FOR IP): Performed by: INTERNAL MEDICINE

## 2023-03-08 PROCEDURE — 80048 BASIC METABOLIC PNL TOTAL CA: CPT

## 2023-03-08 PROCEDURE — 94761 N-INVAS EAR/PLS OXIMETRY MLT: CPT

## 2023-03-08 PROCEDURE — 85025 COMPLETE CBC W/AUTO DIFF WBC: CPT

## 2023-03-08 PROCEDURE — 97116 GAIT TRAINING THERAPY: CPT

## 2023-03-08 PROCEDURE — 99233 SBSQ HOSP IP/OBS HIGH 50: CPT

## 2023-03-08 PROCEDURE — 36415 COLL VENOUS BLD VENIPUNCTURE: CPT

## 2023-03-08 RX ORDER — HYDRALAZINE HYDROCHLORIDE 20 MG/ML
10 INJECTION INTRAMUSCULAR; INTRAVENOUS EVERY 6 HOURS PRN
Status: DISCONTINUED | OUTPATIENT
Start: 2023-03-08 | End: 2023-03-09 | Stop reason: HOSPADM

## 2023-03-08 RX ORDER — PROPRANOLOL HYDROCHLORIDE 10 MG/1
10 TABLET ORAL 3 TIMES DAILY
Status: DISCONTINUED | OUTPATIENT
Start: 2023-03-08 | End: 2023-03-09 | Stop reason: HOSPADM

## 2023-03-08 RX ORDER — LISINOPRIL 40 MG/1
40 TABLET ORAL DAILY
Status: DISCONTINUED | OUTPATIENT
Start: 2023-03-09 | End: 2023-03-09 | Stop reason: HOSPADM

## 2023-03-08 RX ORDER — CARVEDILOL 3.12 MG/1
3.12 TABLET ORAL 2 TIMES DAILY WITH MEALS
Status: DISCONTINUED | OUTPATIENT
Start: 2023-03-08 | End: 2023-03-08

## 2023-03-08 RX ORDER — CARVEDILOL 6.25 MG/1
6.25 TABLET ORAL 2 TIMES DAILY WITH MEALS
Status: DISCONTINUED | OUTPATIENT
Start: 2023-03-08 | End: 2023-03-08

## 2023-03-08 RX ORDER — LORAZEPAM 0.5 MG/1
0.5 TABLET ORAL EVERY 6 HOURS
Status: DISCONTINUED | OUTPATIENT
Start: 2023-03-08 | End: 2023-03-09 | Stop reason: HOSPADM

## 2023-03-08 RX ORDER — LABETALOL 20 MG/4 ML (5 MG/ML) INTRAVENOUS SYRINGE
5 ONCE
Status: COMPLETED | OUTPATIENT
Start: 2023-03-08 | End: 2023-03-08

## 2023-03-08 RX ADMIN — CARVEDILOL 3.12 MG: 3.12 TABLET, FILM COATED ORAL at 10:45

## 2023-03-08 RX ADMIN — SODIUM CHLORIDE, PRESERVATIVE FREE 10 ML: 5 INJECTION INTRAVENOUS at 19:45

## 2023-03-08 RX ADMIN — ACETAMINOPHEN 650 MG: 325 TABLET ORAL at 06:39

## 2023-03-08 RX ADMIN — CEFTRIAXONE SODIUM 1000 MG: 1 INJECTION, POWDER, FOR SOLUTION INTRAMUSCULAR; INTRAVENOUS at 21:50

## 2023-03-08 RX ADMIN — Medication 5 MG: at 14:51

## 2023-03-08 RX ADMIN — LORAZEPAM 0.5 MG: 0.5 TABLET ORAL at 19:45

## 2023-03-08 RX ADMIN — FOLIC ACID 1 MG: 1 TABLET ORAL at 08:05

## 2023-03-08 RX ADMIN — AZITHROMYCIN MONOHYDRATE 500 MG: 500 INJECTION, POWDER, LYOPHILIZED, FOR SOLUTION INTRAVENOUS at 09:27

## 2023-03-08 RX ADMIN — LORAZEPAM 0.5 MG: 0.5 TABLET ORAL at 14:14

## 2023-03-08 RX ADMIN — LEVOTHYROXINE SODIUM 100 MCG: 0.1 TABLET ORAL at 06:39

## 2023-03-08 RX ADMIN — Medication 1 TABLET: at 08:05

## 2023-03-08 RX ADMIN — SODIUM CHLORIDE, PRESERVATIVE FREE 10 ML: 5 INJECTION INTRAVENOUS at 08:05

## 2023-03-08 RX ADMIN — FLUTICASONE PROPIONATE 2 SPRAY: 50 SPRAY, METERED NASAL at 08:05

## 2023-03-08 RX ADMIN — BUSPIRONE HYDROCHLORIDE 10 MG: 10 TABLET ORAL at 08:05

## 2023-03-08 RX ADMIN — ASPIRIN 81 MG 81 MG: 81 TABLET ORAL at 08:05

## 2023-03-08 RX ADMIN — ATORVASTATIN CALCIUM 40 MG: 40 TABLET, FILM COATED ORAL at 17:18

## 2023-03-08 RX ADMIN — ENOXAPARIN SODIUM 40 MG: 100 INJECTION SUBCUTANEOUS at 08:05

## 2023-03-08 RX ADMIN — LISINOPRIL 20 MG: 20 TABLET ORAL at 08:05

## 2023-03-08 RX ADMIN — PROPRANOLOL HYDROCHLORIDE 10 MG: 10 TABLET ORAL at 19:45

## 2023-03-08 RX ADMIN — BUSPIRONE HYDROCHLORIDE 10 MG: 10 TABLET ORAL at 19:45

## 2023-03-08 RX ADMIN — Medication 100 MG: at 08:05

## 2023-03-08 ASSESSMENT — PAIN DESCRIPTION - LOCATION: LOCATION: SHOULDER

## 2023-03-08 ASSESSMENT — PAIN DESCRIPTION - ORIENTATION: ORIENTATION: RIGHT

## 2023-03-08 ASSESSMENT — PAIN SCALES - GENERAL: PAINLEVEL_OUTOF10: 5

## 2023-03-08 ASSESSMENT — PAIN DESCRIPTION - DESCRIPTORS: DESCRIPTORS: SORE

## 2023-03-08 ASSESSMENT — PAIN - FUNCTIONAL ASSESSMENT: PAIN_FUNCTIONAL_ASSESSMENT: ACTIVITIES ARE NOT PREVENTED

## 2023-03-08 NOTE — PROGRESS NOTES
[address] [facility] 5K  Occupational Therapy  Daily Note  Time:   Time In: 0011  Time Out: 1108  Timed Code Treatment Minutes: 25 Minutes  Minutes: 25          Date: 3/8/2023  Patient Name: Lidia Lane,   Gender: female      Room: -14/014-A  MRN: [id number]  : 1955  (74 y.o.)  Referring Practitioner: Jefe Porras DO  Diagnosis: acute respiratory failure with hypoxia  Additional Pertinent Hx: per chart review; Lidia Lane is a pleasant 74 y.o. female who presents to the emergency department from from home, by private vehicle for evaluation of altered mental status. Patient's history is provided by her boyfriend. He states that this morning at 6 AM when he came home from his night shift he had to knock very aggressively in order to wake her up. He states that when she finally answered the door she seemed confused and \"did not make sense \". He states that he went to bed and when he woke up she was still kind of confused. He had run to the store and the patient's sister was unable to get a hold of her. So then he went back home and found her laying on the floor. Patient states that she is unsure how she got on the floor. Patient's boyfriend states that she was awake but incoherent laying on the kitchen floor. He did not notice any blood or injury. He states that she was here approximately 3 weeks ago and was told that she had alcohol withdrawal.  He states she was admitted for a few days and given medication for alcohol withdrawal.  He states that the time they discontinued her Ambien and started her on some other medications. He is unsure if secondary to these medications as the reason that she continues to have altered mental status and falls. He denies any recent fever, vomiting, diarrhea, or cough.     Restrictions/Precautions:  Restrictions/Precautions: Fall Risk, General Precautions     SUBJECTIVE: Pt was on EOB when OT treatment team arrived, RN gave approval for treatment session, Pt verbally agreed to treatment    PAIN: 0/10:     Vitals: Oxygen: 94% on room air  Heart Rate: 113 with RN aware, Pt on medication     COGNITION: Decreased Insight and Impulsive    ADL:   Grooming: Supervision. With Pt combing hair  Bathing: Supervision. Pt  completed UE sponge bathing at the sink seating in a chair  Upper Extremity Dressing: Stand By Assistance. Pt was able to complete donning and doffing gown . BALANCE:  Standing Balance: Stand By Assistance. With no AD during ADL routine with Pt displaying good safety this date    TRANSFERS:  Sit to Stand:  Stand By Assistance. From the EOB   Stand to Sit: Stand By Assistance. To the recliner     FUNCTIONAL MOBILITY:  Assistive Device: None  Assist Level:  Stand By Assistance. Distance:  HH distances with Pt noted to have HR of 115 with RN aware          ASSESSMENT:     Activity Tolerance:  Patient tolerance of  treatment: Good treatment tolerance      Discharge Recommendations: Home with assist as needed  Equipment Recommendations: Equipment Needed: No  Plan: Times Per Week: 3-5x  Times Per Day: Once a day  Current Treatment Recommendations: Strengthening, Balance training, Functional mobility training, Endurance training, Safety education & training, Neuromuscular re-education, Patient/Caregiver education & training, Equipment evaluation, education, & procurement, Self-Care / ADL    Patient Education  Patient Education: ADL's, Energy Conservation, and Home Safety    Goals  Short Term Goals  Time Frame for Short Term Goals: by discharge  Short Term Goal 1: patient will tolerate 6 min functional standing with (S) to increase activity tolerance for ADL routine. Short Term Goal 2: patient will functionally ambulate house hold distances with (S) and least restrictive device. Short Term Goal 3: patient will complete ADL routine with MOD I and 0-1 cues for safety and sequencing.   Short Term Goal 4: patient will participate in moderate resistive UB exer to increase UB strength for functional transfers. Following session, patient left in safe position with all fall risk precautions in place.

## 2023-03-08 NOTE — PROGRESS NOTES
[address] [place] [address]  INPATIENT PHYSICAL THERAPY  DAILY NOTE  [facility] 5K - 5K-14/014-A      Time In:   Time Out: 1351  Timed Code Treatment Minutes: 23 Minutes  Minutes: 23          Date: 3/8/2023  Patient Name: Irma Rivera,  Gender:  female        MRN: [id number]  : 1955  (74 y.o.)     Referring Practitioner: Lynda Beth,   Diagnosis: Hypoxia  Additional Pertinent Hx: Irma Rivera is a 74 y.o. female with PMHx of alcohol use disorder, bipolar affective disorder, essential hypertension, hyperlipidemia, chronic obstructive pulmonary disease, GERD who presents to [address] [place] [address] with altered mental status and near syncope. She was admitted 3 weeks ago for alcohol withdrawal.  Patient's history is reported by herself boyfriend who was in the room at that time. He states that on the morning of admission at 6 AM when he got home from the night shift. He had to look very aggressively for hard to wake up. He states that when she finally answered the door she seemed confused and did not make sense. He also states that he went to the bedroom and when he woke up she was still confused in the morning. He states that he went to store in the morning and when he got back home he found her laying on the floor. She did not lose consciousness. Patient states that she fell lightheaded and fell to the floor without losing consciousness. Her boyfriend states she was incoherent and laying on the kitchen floor. Prior Level of Function:  Lives With: Significant other  Type of Home: House  Home Layout: One level  Home Access: Level entry   Bathroom Shower/Tub: Tub/Shower unit  Bathroom Toilet: Standard  Bathroom Accessibility: Accessible    Receives Help From: Family  ADL Assistance: [address]: Independent  Homemaking Responsibilities: Yes  Ambulation Assistance: Independent  Transfer Assistance: Independent  Active :  Yes  Additional Comments: patient used no AD prior to admit. patient's significant other works outside the home on swing shift. Restrictions/Precautions:  Restrictions/Precautions: Fall Risk, General Precautions       SUBJECTIVE: pt up in chair on arrival and agreeable for therapy- pt impulsive and demonstrated decreased safety awareness she also had some difficulty following multi step directions and needed redirected several times     PAIN: 0/10:       Vitals: Heart Rate: 101-115 with activity     OBJECTIVE:  Bed Mobility:  Not Tested    Transfers:  Sit to Stand: Stand By Assistance  Stand to Sit:Stand By Assistance  Impulsive but no loss of balance   Ambulation:  Stand By Assistance  Distance: 250x1 and short distances in room   Surface: Level Tile  Device:No Device  Gait Deviations:  pt moving quickly with good step length but slightly narrow base of support - she did have one loss of balance while walking to the toilet w/ divided attention as she was starting to pull her pants down before she got to the toilet and had a loss of balance and used rail to catch her balance ** encouraged pt to slow down overall      Balance:  Dynamic balance w/o UE at support pt reaching above head and to knees with SBA     Exercise:  Patient was guided in 1 set(s) 10 reps of exercise to both lower extremities. Standing ex with UEs at support and cues and demonstration for technique   . Exercises were completed for increased independence with functional mobility. Functional Outcome Measures: Completed  Balance Score: 12  Gait Score: 11  Tinetti Total Score: 23  Risk Indicators:  Less than/equal to 18 = high risk  19-23 Moderate risk  Greater than/equal to 24 = low risk    AM-PAC Inpatient Mobility without Stair Climbing Raw Score : 16  AM-PAC Inpatient without Stair Climbing T-Scale Score : 45.54    ASSESSMENT:  Assessment: Patient progressing toward established goals. Activity Tolerance:  Patient tolerance of  treatment: good.         Equipment Recommendations: Other: monitor for needs  Discharge Recommendations: Home with Assist as Needed and pt may benefit from speech cognation eval unsure of pts baseline- pt did complete a tinetti balance test and scored 23/28 putting her at a moderate fall risk - if agreeable she may benefit from OP therapy     Plan: Current Treatment Recommendations: Strengthening, Balance training, Endurance training, Functional mobility training, Gait training, Transfer training, Home exercise program, Patient/Caregiver education & training, Safety education & training, Therapeutic activities  General Plan:  (5x GM)    Patient Education  Patient Education: Plan of Care, encouraged her to slow down     Goals:  Patient Goals : go home  Short Term Goals  Time Frame for Short Term Goals: at discharge  Short Term Goal 1: Pt to be Mod I for supine <> sit to get in/out of bed  Short Term Goal 2: Pt to be Mod I for sit <> stand to get up to ambulate  Short Term Goal 3: Pt to ambulate >150 ft with no AD with Supervision for household and community distances  Long Term Goals  Time Frame for Long Term Goals : not set due to short ELOS    Following session, patient left in safe position with all fall risk precautions in place.

## 2023-03-08 NOTE — PROGRESS NOTES
A home oxygen evaluation has been completed. [x]Patient is an inpatient. It is expected that the patient will be discharged within the next 48 hours. Qualified provider to write orders for possible sleep study or home oxygen prescription. Social service/care managers will arrange for home oxygen if ordered. If patient is active, arrange for Home Medical supplier to assess for Oxygen Conserving Device per pulse oximetry. []Patient is an outpatient. Results will be faxed to the ordering provider. Qualified provider to write orders for possible sleep study or home oxygen prescription. Patient was placed on room air for all day. SpO2 was 97 % on room air at rest. Patients SpO2 was 89% or above and did not qualify for home oxygen. Patient was walked for 5 minutes. SpO2 was 96 % during walking. Patients SpO2 was 89% or above and did not qualify for home oxygen.

## 2023-03-08 NOTE — PROGRESS NOTES
Hospitalist Progress Note    Patient:  Sarika Bang      Unit/Bed:5K-14/014-A    YOB: 1955    MRN: [id number]       Acct: [de-identified]     PCP: Aicha Garcia    Date of Admission: 3/6/2023    Assessment/Plan:    Near Syncope:   Likely due to polypharmacy. Patient states that she feels lightheaded and falls to the floor occasionally. She denies LOC. But states that she crawls to the living room couch. Her partner found her on the floor in the kitchen a day prior to presentation, and she was conscious. CT head on 3/6/2023 demonstrated no acute intracranial process. EKG on 3/6/2023, demonstrated normal sinus rhythm with incomplete right bundle branch block. CTA neck on 2/16/2023 showed no high-grade cervical ICA stenosis. Hx of etOh abuse, as she was admitted 3 weeks ago for etOh withdrawal. EtOh level on admission <0.01%. She cannot remember when she had her last drink. Last echo on 2/16/2023 demonstrated EF of 45% with hypokinetic motion of mid anteroseptal wall of the left ventricle. Okay to resume propranolol and Ativan  Perform orthostatic vital signs every shift: Negative  Continuous telemetry monitoring  Metabolic encephalopathy, resolved:   Likely due to polypharmacy vs CAP . Did not Present with confusion and altered mental status . Patient was A&O x4, and was able to answer questions which some amnesia. Patient takes Ativan 4 times daily. CT of the head without contrast demonstrated no acute intracranial process. TSH and T4 were WNL, urine tox screen was negative. CTA of the chest w/wo contrast demonstrated right middle lobe segmental pneumonia. VBG showing pH 7.4, PCO2 48, PO2 22, HCO3 30, base excess 4. LFT's wdl. Serum cortisol 9.74.  Urine cortisol test pending  Okay to resume propranolol and Ativan  See treatment of CAP in #3  CAP:   Did consider ?possible aspiration 2/2 reduced consciousness from polypharmacy??> however have lower suspicion for aspiration at this time as patient denies difficulty with swallowing, no hx of aspiration in past.   CXR demonstrated multifocal bilateral opacities, while CTA chest w/wo contrast demonstrated abnormal airspace opacity in the right  middle lobe. WBC WNL, temp 97.7, RR 14. Pro laci 0.03 (all POA)  Continue rocephin daily (initiated 3/6),   Continue Azithromycin (initiated 3/7) > provides typical can atypical coverage. Did received 1 time dose of flagyl in the ED. She has a penicillin allergy and may not be a good candidate for zosyn   Blood cultures x2 preliminary result NGTD  Strep pneumonia and Legionella urine antigen assay test pending. Molecular pneumonia panel ordered, respiratory culture ordered. Acute hypoxic respiratory failure, secondary to #3  Imaging noted in #3  Came in hypoxic 86% RA, was placed on 3L/NC. Now weaned back to RA which is her baseline. O2 titration protocol  Continue tx's in #3  Home O2 eval prior to discharge. Pulmonary hygiene: IS, acapella. Alcohol abuse disorder  last etOH withdrawal incident was on the 2/16/2023. Serum EtOH POA was < 0.01. PAWS score 6. Pt states she cant remember when she had her last drink. Continue folic acid, multivitamin and thiamine daily  Continue phenobarbital push prophylaxis prn  Addiction services consulted   Primary hypertension, uncontrolled w/ hypertensive urgency:   Continue losartan-increase to 40 mg daily from 20 mg  Continue propranolol 10 mg 3 times daily. As needed hydralazine for SBP greater than 165 mmHg. 3/8: BPs noted to be 200/91. Patient asymptomatic and given 5 mg IV labetalol. Hyperlipidemia:   Statin  NIDDM T2, diet controlled:   A1c on 2/17/2023 5.9%. Accu-Cheks. Low-dose SSI as needed. Hypoglycemia protocol. Diabetic diet. Acquired hypothyroidism:   TSH 1.89 and T4 1.57 on 3/6/23. Continue home Synthroid. GERD:   PPI  Anxiety disorder:   Continue BuSpar. Continue prn Ativan and propranolol.    Follow-up with outpatient psychiatry to wean off Ativan. Hx of polysubstance abuse:   Patient is on polypharmacy with Ativan, EtOH, in addition to other psychiatric medications. Follow-up with psychiatric outpatient to wean off Ativan. We will also need to follow-up for alcohol abuse disorder. Chronic systolic CHF/HFmrEF, compensated:   Echo (2/17/2023) notable for EF 45%, hypokinetic motion of the mid anterior septal wall noted in the LV, trivial TR, trivial PA. Strict I&O's, daily weights. Continue statin, propranolol, losartan, ASA. Cardiac diet GARRY      Expected discharge date:  Pending clinical course    Disposition:    [x] Home       [] TCU       [] Rehab       [] Psych       [] SNF       [] [place]       [] Other-    Chief Complaint: Altered mental status with lightheadedness    Hospital Course: Per HPI documented 3/7/2023: \" Noris Gonzalez is a 74 y.o. female with PMHx of alcohol use disorder, bipolar affective disorder, essential hypertension, hyperlipidemia, chronic obstructive pulmonary disease, GERD who presents to [facility] with altered mental status and near syncope. She was admitted 3 weeks ago for alcohol withdrawal.  Patient's history is reported by herself boyfriend who was in the room at that time. He states that on the morning of admission at 6 AM when he got home from the night shift. He had to look very aggressively for hard to wake up. He states that when she finally answered the door she seemed confused and did not make sense. He also states that he went to the bedroom and when he woke up she was still confused in the morning. He states that he went to store in the morning and when he got back home he found her laying on the floor. She did not lose consciousness. Patient states that she fell lightheaded and fell to the floor without losing consciousness. Her boyfriend states she was incoherent and laying on the kitchen floor.   She denied shortness of breath, chest pain, fever, chills, nausea, vomiting, abdominal pains headaches, changes in vision. ED course: EKG demonstrated normal sinus rhythm without any ST changes. She also has some incomplete right bundle branch block. Chest x-ray was consistent with multifocal bilateral secondary to infection. CT head without contrast was unremarkable for acute intracranial process. CT chest with and without contrast demonstrated abnormal airspace opacity in the right middle lobe. Procalcitonin was WNL, troponin was WNL, TSH and T4 were WNL,\"    3/8/23: Resumed care of patient today. Patient sitting up in bedside recliner, nontoxic in appearance, no apparent distress. Remains afebrile, satting 96% on room air, hemodynamically stable. Patient stating she feels much better this morning. States that she is not having a cough, has no complaints of shortness of breath or chest pains. She has been weaned back to room air. Home oxygen evaluation ordered. Patient remains alert and oriented x4. However she is noted to be severely hypertensive with a blood pressure of 200/91. She was given 5 mg IV labetalol. We will keep patient overnight for further management of her blood pressures. She was also given dose of as needed Ativan. Patient states that she takes 1 mg 4 times daily for a total of 4 mg/day. However when looking at her prescription it appears that she supposed to be taking 0.5 mg every 6 hours for maximum dose of 2 mg. Patient will need to be weaned from her Ativan. She states that she follows up with a psychiatrist outpatient and has a recent follow-up appointment with her PCP soon. If patient's blood pressures improved and she and passes her home O2 eval will likely discharge home.       Subjective (past 24 hours):      Denies chest pains, palpitations, dizziness, lightheadedness, shortness of breath at rest, dyspnea on exertion, cough, abdominal pain, nausea, vomiting, diarrhea, fever, chills. Medications:  Reviewed    Infusion Medications    dextrose      sodium chloride Stopped (03/07/23 0206)     Scheduled Medications    folic acid  1 mg Oral Daily    multivitamin  1 tablet Oral Daily    cefTRIAXone (ROCEPHIN) IV  1,000 mg IntraVENous Q24H    sodium chloride flush  5-40 mL IntraVENous 2 times per day    enoxaparin  40 mg SubCUTAneous Daily    aspirin  81 mg Oral Daily    atorvastatin  40 mg Oral QPM    busPIRone  10 mg Oral BID    fluticasone  2 spray Nasal Daily    levothyroxine  100 mcg Oral Daily    lisinopril  20 mg Oral Daily    thiamine  100 mg Oral Daily     PRN Meds: glucose, dextrose bolus **OR** dextrose bolus, glucagon (rDNA), dextrose, acetaminophen **OR** acetaminophen, LORazepam, sodium chloride flush, sodium chloride, ondansetron **OR** ondansetron, polyethylene glycol, PHENobarbital **OR** PHENobarbital **OR** PHENobarbital (LUMINAL) IVPB      Intake/Output Summary (Last 24 hours) at 3/8/2023 0819  Last data filed at 3/8/2023 0345  Gross per 24 hour   Intake 570 ml   Output --   Net 570 ml       Diet:  ADULT DIET; Regular    Exam:  BP (!) 156/84   Pulse (!) 104   Temp 98.8 °F (37.1 °C) (Oral)   Resp 18   Ht 5' 5\" (1.651 m)   Wt 149 lb 4 oz (67.7 kg)   LMP  (LMP Unknown)   SpO2 97%   BMI 24.84 kg/m²     General appearance: No apparent distress, appears stated age and cooperative. HEENT: Pupils equal, round, and reactive to light. Conjunctivae/corneas clear. Neck: Supple, with full range of motion. No jugular venous distention. Trachea midline. Respiratory:  Normal respiratory effort. Able to speak full clear sentences. Clear to auscultation, bilaterally without Rales/Wheezes/Rhonchi. Cardiovascular: Regular rate and rhythm with normal S1/S2 without murmurs, rubs or gallops. Abdomen: Soft, non-tender, non-distended with normal bowel sounds. Musculoskeletal: passive and active ROM x 4 extremities.   Skin: Skin color, texture, turgor normal.  No rashes or lesions. Neurologic:  Neurovascularly intact without any focal sensory/motor deficits. Cranial nerves: II-XII intact, grossly non-focal.  Psychiatric: Alert and oriented, thought content appropriate, normal insight  Capillary Refill: Brisk,< 3 seconds   Peripheral Pulses: +2 palpable, equal bilaterally       Labs:   Recent Labs     03/06/23  1810 03/07/23  0549   WBC 9.4 7.1   HGB 12.4 12.9   HCT 38.3 39.5    220     Recent Labs     03/06/23  1810 03/07/23  0549    142   K 3.8 3.8    105   CO2 22* 24   BUN 13 9   CREATININE 0.8 0.7   CALCIUM 8.9 8.8     Recent Labs     03/07/23  0549   AST 33   ALT 33   BILITOT 0.3   ALKPHOS 126     No results for input(s): INR in the last 72 hours. No results for input(s): Adonica Hoose in the last 72 hours. Microbiology:      Urinalysis:      Lab Results   Component Value Date/Time    NITRU NEGATIVE 02/18/2023 02:11 PM    WBCUA NONE SEEN 02/18/2023 02:11 PM    BACTERIA NONE SEEN 02/18/2023 02:11 PM    RBCUA NONE SEEN 02/18/2023 02:11 PM    BLOODU NEGATIVE 02/18/2023 02:11 PM    SPECGRAV 1.007 02/18/2023 02:11 PM    GLUCOSEU NEGATIVE 02/17/2023 06:34 PM       Radiology:  CT Head W/O Contrast    Result Date: 3/6/2023  CT HEAD WITHOUT CONTRAST Comparison: CT/SR - CT HEAD WO CONTRAST - 02/16/2023 08:16 PM EST Findings: No acute intracranial hemorrhage, extra-axial fluid collection, hydrocephalus or midline shift. Stable generalized parenchymal atrophy which appears advanced for age. There are periventricular and subcortical white matter hypodensities which are nonspecific but most likely related to microangiopathic gliosis. No sinus or mastoid fluid. Visualized orbits: No acute abnormalities. There is no acute fracture. No acute intracranial process. This document has been electronically signed by: Aspen Interiano.  DO Agatha on 03/06/2023 09:29 PM All CTs at this facility use dose modulation techniques and iterative reconstructions, and/or weight-based dosing when appropriate to reduce radiation to a low as reasonably achievable. CTA CHEST W WO CONTRAST    Result Date: 3/6/2023  CT ANGIOGRAPHY CHEST WITH CONTRAST. 3D POSTPROCESSING. Comparison: CT - CTA CHEST - 05/27/2016 07:47 PM EDT Findings: The heart is enlarged. RV/LV ratio is normal. Unremarkable thoracic aorta and great vessels. No aneurysm or dissection. No thyromegaly or lymphadenopathy. There is segmental airspace opacity in the right middle lobe. There is bilateral lower lobe atelectasis. No pleural effusion or pneumothorax. Upper abdomen: No acute abnormalities. Osseous structures: No acute abnormalities. 1. No pulmonary embolus. 2. Abnormal airspace opacity in the right middle lobe suggesting segmental pneumonia in the appropriate clinical setting. This document has been electronically signed by: Charlie Lewis. DO Agatha on 03/06/2023 09:27 PM All CTs at this facility use dose modulation techniques and iterative reconstructions, and/or weight-based dosing when appropriate to reduce radiation to a low as reasonably achievable. 3D Post-processing was performed on this study. XR CHEST PORTABLE    Result Date: 3/6/2023  1 view chest x-ray Comparison: CR/SR - XR CHEST PORTABLE - 11/23/2022 06:28 PM EST Findings: There are bilateral perihilar and bilateral lower lobe opacities. No pneumothorax. Heart size is accentuated by portable technique and suboptimal inspiratory effort. Mediastinal contours and visualized bony thorax appear intact. Multifocal bilateral opacities secondary to infection or edema. This document has been electronically signed by: Charlie Lewis.  DO Agatha on 03/06/2023 09:37 PM      DVT prophylaxis: [x] Lovenox                                 [] SCDs                                 [] SQ Heparin                                 [] Encourage ambulation           [] Already on Anticoagulation     Code Status: Full Code    PT/OT Eval Status: Yes    Tele:   [x] yes             [] no    Active Hospital Problems    Diagnosis Date Noted    Acute respiratory failure with hypoxia [facility]) [J96.01] 03/06/2023     Priority: Medium       Electronically signed by SUSAN Patel CNP on 3/8/2023 at 8:58 AM

## 2023-03-08 NOTE — PLAN OF CARE
Problem: Pain  Goal: Verbalizes/displays adequate comfort level or baseline comfort level  Outcome: Progressing  Flowsheets (Taken 3/7/2023 2139)  Verbalizes/displays adequate comfort level or baseline comfort level:   Encourage patient to monitor pain and request assistance   Assess pain using appropriate pain scale   Implement non-pharmacological measures as appropriate and evaluate response   Administer analgesics based on type and severity of pain and evaluate response   Consider cultural and social influences on pain and pain management     Problem: Safety - Adult  Goal: Free from fall injury  Outcome: Progressing  Flowsheets (Taken 3/8/2023 0130)  Free From Fall Injury: Instruct family/caregiver on patient safety     Problem: ABCDS Injury Assessment  Goal: Absence of physical injury  Outcome: Progressing  Flowsheets (Taken 3/8/2023 0130)  Absence of Physical Injury: Implement safety measures based on patient assessment     Problem: Respiratory - Adult  Goal: Achieves optimal ventilation and oxygenation  Outcome: Progressing  Flowsheets (Taken 3/7/2023 2139)  Achieves optimal ventilation and oxygenation:   Assess for changes in respiratory status   Assess for changes in mentation and behavior   Position to facilitate oxygenation and minimize respiratory effort   Encourage broncho-pulmonary hygiene including cough, deep breathe, incentive spirometry   Assess and instruct to report shortness of breath or any respiratory difficulty     Problem: Musculoskeletal - Adult  Goal: Return ADL status to a safe level of function  Outcome: Progressing  Flowsheets (Taken 3/7/2023 2139)  Return ADL Status to a Safe Level of Function:   Administer medication as ordered   Assess activities of daily living deficits and provide assistive devices as needed     Problem: Discharge Planning  Goal: Discharge to home or other facility with appropriate resources  Outcome: Progressing  Flowsheets (Taken 3/7/2023 2139)  Discharge to home or other facility with appropriate resources:   Identify barriers to discharge with patient and caregiver   Arrange for needed discharge resources and transportation as appropriate   Identify discharge learning needs (meds, wound care, etc)   Refer to discharge planning if patient needs post-hospital services based on physician order or complex needs related to functional status, cognitive ability or social support system   Care plan reviewed with patient. Patient verbalizes understanding of the care plan and contributed to goal setting.

## 2023-03-09 VITALS
DIASTOLIC BLOOD PRESSURE: 85 MMHG | SYSTOLIC BLOOD PRESSURE: 144 MMHG | WEIGHT: 149.25 LBS | HEART RATE: 88 BPM | HEIGHT: 65 IN | OXYGEN SATURATION: 97 % | TEMPERATURE: 97.9 F | BODY MASS INDEX: 24.87 KG/M2 | RESPIRATION RATE: 18 BRPM

## 2023-03-09 LAB
25(OH)D3 SERPL-MCNC: 28 NG/ML (ref 30–100)
AMMONIA PLAS-MCNC: 21 UMOL/L (ref 11–60)
ANION GAP SERPL CALC-SCNC: 13 MEQ/L (ref 8–16)
BASOPHILS ABSOLUTE: 0.1 THOU/MM3 (ref 0–0.1)
BASOPHILS NFR BLD AUTO: 0.9 %
BUN SERPL-MCNC: 10 MG/DL (ref 7–22)
CALCIUM SERPL-MCNC: 9.4 MG/DL (ref 8.5–10.5)
CHLORIDE SERPL-SCNC: 107 MEQ/L (ref 98–111)
CO2 SERPL-SCNC: 24 MEQ/L (ref 23–33)
CREAT SERPL-MCNC: 0.6 MG/DL (ref 0.4–1.2)
DEPRECATED RDW RBC AUTO: 44.5 FL (ref 35–45)
EOSINOPHIL NFR BLD AUTO: 3.4 %
EOSINOPHILS ABSOLUTE: 0.2 THOU/MM3 (ref 0–0.4)
ERYTHROCYTE [DISTWIDTH] IN BLOOD BY AUTOMATED COUNT: 12.9 % (ref 11.5–14.5)
FOLATE SERPL-MCNC: > 20 NG/ML (ref 4.8–24.2)
GFR SERPL CREATININE-BSD FRML MDRD: > 60 ML/MIN/1.73M2
GLUCOSE SERPL-MCNC: 153 MG/DL (ref 70–108)
HCT VFR BLD AUTO: 42.4 % (ref 37–47)
HGB BLD-MCNC: 13.2 GM/DL (ref 12–16)
IMM GRANULOCYTES # BLD AUTO: 0.03 THOU/MM3 (ref 0–0.07)
IMM GRANULOCYTES NFR BLD AUTO: 0.4 %
L PNEUMO1 AG UR QL IA.RAPID: NEGATIVE
LYMPHOCYTES ABSOLUTE: 1.7 THOU/MM3 (ref 1–4.8)
LYMPHOCYTES NFR BLD AUTO: 24.6 %
MCH RBC QN AUTO: 29.5 PG (ref 26–33)
MCHC RBC AUTO-ENTMCNC: 31.1 GM/DL (ref 32.2–35.5)
MCV RBC AUTO: 94.9 FL (ref 81–99)
MONOCYTES ABSOLUTE: 0.5 THOU/MM3 (ref 0.4–1.3)
MONOCYTES NFR BLD AUTO: 7.4 %
NEUTROPHILS NFR BLD AUTO: 63.3 %
NRBC BLD AUTO-RTO: 0 /100 WBC
PLATELET # BLD AUTO: 219 THOU/MM3 (ref 130–400)
PMV BLD AUTO: 9.8 FL (ref 9.4–12.4)
POTASSIUM SERPL-SCNC: 4.5 MEQ/L (ref 3.5–5.2)
RBC # BLD AUTO: 4.47 MILL/MM3 (ref 4.2–5.4)
SEGMENTED NEUTROPHILS ABSOLUTE COUNT: 4.3 THOU/MM3 (ref 1.8–7.7)
SODIUM SERPL-SCNC: 144 MEQ/L (ref 135–145)
STREP PNEUMO AG, UR: NEGATIVE
VIT B12 SERPL-MCNC: 926 PG/ML (ref 211–911)
WBC # BLD AUTO: 6.8 THOU/MM3 (ref 4.8–10.8)

## 2023-03-09 PROCEDURE — 6360000002 HC RX W HCPCS

## 2023-03-09 PROCEDURE — 6370000000 HC RX 637 (ALT 250 FOR IP)

## 2023-03-09 PROCEDURE — 82306 VITAMIN D 25 HYDROXY: CPT

## 2023-03-09 PROCEDURE — 2580000003 HC RX 258

## 2023-03-09 PROCEDURE — 85025 COMPLETE CBC W/AUTO DIFF WBC: CPT

## 2023-03-09 PROCEDURE — 97530 THERAPEUTIC ACTIVITIES: CPT

## 2023-03-09 PROCEDURE — 36415 COLL VENOUS BLD VENIPUNCTURE: CPT

## 2023-03-09 PROCEDURE — 82746 ASSAY OF FOLIC ACID SERUM: CPT

## 2023-03-09 PROCEDURE — 92523 SPEECH SOUND LANG COMPREHEN: CPT

## 2023-03-09 PROCEDURE — 80048 BASIC METABOLIC PNL TOTAL CA: CPT

## 2023-03-09 PROCEDURE — 82140 ASSAY OF AMMONIA: CPT

## 2023-03-09 PROCEDURE — 82607 VITAMIN B-12: CPT

## 2023-03-09 RX ORDER — LISINOPRIL 40 MG/1
40 TABLET ORAL DAILY
Qty: 30 TABLET | Refills: 3 | Status: CANCELLED | OUTPATIENT
Start: 2023-03-09

## 2023-03-09 RX ORDER — FOLIC ACID 1 MG/1
1 TABLET ORAL DAILY
Qty: 30 TABLET | Refills: 3 | Status: SHIPPED | OUTPATIENT
Start: 2023-03-09

## 2023-03-09 RX ORDER — MULTIVITAMIN WITH IRON
1 TABLET ORAL DAILY
Qty: 30 TABLET | Refills: 0 | Status: SHIPPED | OUTPATIENT
Start: 2023-03-09 | End: 2023-04-08

## 2023-03-09 RX ORDER — CHOLECALCIFEROL (VITAMIN D3) 25 MCG
1000 TABLET ORAL DAILY
Qty: 60 TABLET | Refills: 3 | Status: SHIPPED | OUTPATIENT
Start: 2023-03-09

## 2023-03-09 RX ORDER — AZITHROMYCIN 500 MG/1
500 TABLET, FILM COATED ORAL DAILY
Qty: 3 TABLET | Refills: 0 | Status: SHIPPED | OUTPATIENT
Start: 2023-03-09 | End: 2023-03-12

## 2023-03-09 RX ORDER — VITAMIN B COMPLEX
1000 TABLET ORAL DAILY
Status: DISCONTINUED | OUTPATIENT
Start: 2023-03-09 | End: 2023-03-09 | Stop reason: HOSPADM

## 2023-03-09 RX ADMIN — Medication 1000 UNITS: at 08:33

## 2023-03-09 RX ADMIN — LORAZEPAM 0.5 MG: 0.5 TABLET ORAL at 08:33

## 2023-03-09 RX ADMIN — Medication 100 MG: at 08:20

## 2023-03-09 RX ADMIN — BUSPIRONE HYDROCHLORIDE 10 MG: 10 TABLET ORAL at 08:19

## 2023-03-09 RX ADMIN — FLUTICASONE PROPIONATE 2 SPRAY: 50 SPRAY, METERED NASAL at 08:22

## 2023-03-09 RX ADMIN — ASPIRIN 81 MG 81 MG: 81 TABLET ORAL at 08:19

## 2023-03-09 RX ADMIN — LISINOPRIL 40 MG: 40 TABLET ORAL at 08:22

## 2023-03-09 RX ADMIN — LORAZEPAM 0.5 MG: 0.5 TABLET ORAL at 02:59

## 2023-03-09 RX ADMIN — AZITHROMYCIN MONOHYDRATE 500 MG: 500 INJECTION, POWDER, LYOPHILIZED, FOR SOLUTION INTRAVENOUS at 08:27

## 2023-03-09 RX ADMIN — PROPRANOLOL HYDROCHLORIDE 10 MG: 10 TABLET ORAL at 08:20

## 2023-03-09 RX ADMIN — Medication 1 TABLET: at 08:21

## 2023-03-09 RX ADMIN — FOLIC ACID 1 MG: 1 TABLET ORAL at 08:22

## 2023-03-09 RX ADMIN — LEVOTHYROXINE SODIUM 100 MCG: 0.1 TABLET ORAL at 06:02

## 2023-03-09 RX ADMIN — ENOXAPARIN SODIUM 40 MG: 100 INJECTION SUBCUTANEOUS at 08:18

## 2023-03-09 RX ADMIN — SODIUM CHLORIDE, PRESERVATIVE FREE 10 ML: 5 INJECTION INTRAVENOUS at 08:18

## 2023-03-09 NOTE — PLAN OF CARE
Problem: Pain  Goal: Verbalizes/displays adequate comfort level or baseline comfort level  Outcome: Progressing  Flowsheets (Taken 3/8/2023 2040)  Verbalizes/displays adequate comfort level or baseline comfort level:   Encourage patient to monitor pain and request assistance   Administer analgesics based on type and severity of pain and evaluate response   Assess pain using appropriate pain scale   Implement non-pharmacological measures as appropriate and evaluate response     Problem: Safety - Adult  Goal: Free from fall injury  Outcome: Progressing  Flowsheets (Taken 3/8/2023 2040)  Free From Fall Injury: Instruct family/caregiver on patient safety     Problem: ABCDS Injury Assessment  Goal: Absence of physical injury  Outcome: Progressing  Flowsheets (Taken 3/8/2023 2040)  Absence of Physical Injury: Implement safety measures based on patient assessment     Problem: Respiratory - Adult  Goal: Achieves optimal ventilation and oxygenation  Outcome: Progressing  Flowsheets (Taken 3/8/2023 2040)  Achieves optimal ventilation and oxygenation:   Assess for changes in respiratory status   Position to facilitate oxygenation and minimize respiratory effort   Respiratory therapy support as indicated   Assess for changes in mentation and behavior   Oxygen supplementation based on oxygen saturation or arterial blood gases   Assess and instruct to report shortness of breath or any respiratory difficulty     Problem: Musculoskeletal - Adult  Goal: Return ADL status to a safe level of function  Outcome: Progressing  Flowsheets (Taken 3/8/2023 2040)  Return ADL Status to a Safe Level of Function:   Administer medication as ordered   Obtain physical therapy/occupational therapy consults as needed   Assess activities of daily living deficits and provide assistive devices as needed   Assist and instruct patient to increase activity and self care as tolerated     Problem: Discharge Planning  Goal: Discharge to home or other facility with appropriate resources  Outcome: Progressing  Flowsheets (Taken 3/8/2023 2040)  Discharge to home or other facility with appropriate resources:   Identify barriers to discharge with patient and caregiver   Identify discharge learning needs (meds, wound care, etc)   Arrange for needed discharge resources and transportation as appropriate

## 2023-03-09 NOTE — DISCHARGE INSTRUCTIONS
Please follow-up with your primary care doctor within 1 week upon discharge for follow-up on your pneumonia. Please follow-up with your psychiatrist in regards to your Ativan. You need to be weaned off of this medication slowly. Follow up with them in 1 week upon discharge. Please continue to take all your home medications. You are being discharged with a prescription for azithromycin (antibiotic). Please take antibiotic as directed and complete full course of therapy despite improvement or resolution of symptoms. Continue multivitamin, thiamine, folic acid, and vitamin D. Continue to use your incentive spirometer and Acapella device a minimum of 10 times every hour to help exercise your lungs. If you are to experience any new or worsening symptoms you can return back to the emergency department for reevaluation.

## 2023-03-09 NOTE — DISCHARGE SUMMARY
Hospital Medicine Discharge Summary      Patient Identification:   William Mejia   : 1955  MRN: [id number]   Account: [de-identified]      Patient's PCP: Danna Jauregui Date: 3/6/2023     Discharge Date: 3/9/2023      Admitting Physician: Kaushal Sutton MD     Discharging Nurse Practitioner: Sanjeev Hodges APRN - CNP     Discharge Diagnoses with Assessment/Plan:    Near Syncope:   Likely due to polypharmacy. Patient stated that she felt lightheaded and falls to the floor occasionally (PTA). She denies LOC. But states that she crawls to the living room couch. Her partner found her on the floor in the kitchen a day prior to presentation, and she was conscious (PTA). CT head on 3/6/2023 demonstrated no acute intracranial process. EKG on 3/6/2023, demonstrated normal sinus rhythm with incomplete right bundle branch block. CTA neck on 2023 showed no high-grade cervical ICA stenosis. Hx of etOh abuse, as she was admitted 3 weeks ago for etOh withdrawal. EtOh level on admission <0.01%. She cannot remember when she had her last drink. Last echo on 2023 demonstrated EF of 45% with hypokinetic motion of mid anteroseptal wall of the left ventricle. Okay to resume propranolol and Ativan  Perform orthostatic vital signs: Negative  Continuous telemetry monitoring -> d/c at discharge. PT/OT consulted: Home with assistance as needed. [facility] services were offered to patient and she politely declined. Metabolic encephalopathy, resolved:   Likely due to polypharmacy vs CAP . Did not Present with confusion and altered mental status . Patient was A&O x4, and was able to answer questions which some amnesia. Patient takes Ativan 4 times daily. CT of the head without contrast demonstrated no acute intracranial process. TSH and T4 were WNL, urine tox screen was negative. CTA of the chest w/wo contrast demonstrated right middle lobe segmental pneumonia.  VBG showing pH 7.4, PCO2 48, PO2 22, HCO3 30, base excess 4. LFT's wdl. Serum cortisol 9.74. Urine cortisol test pending  Okay to resume propranolol and Ativan  See treatment of CAP in #3  SLP consulted for cog eval which noted mild cognitive impairment. Recommended further [facility] SLP services and patient declined. CAP:   Did consider ?possible aspiration 2/2 reduced consciousness from polypharmacy??> however have lower suspicion for aspiration at this time as patient denies difficulty with swallowing, no hx of aspiration in past.   CXR demonstrated multifocal bilateral opacities, while CTA chest w/wo contrast demonstrated abnormal airspace opacity in the right  middle lobe. WBC WNL, temp 97.7, RR 14. (POA)  Received rocephin 3/6 -3/8   Received Azithromycin 3/7-3/9 -> d/c with remaining doses to complete 5 day course of therapy. Did received 1 time dose of flagyl in the ED. Blood cultures x2 NGTD  Strep pneumonia and Legionella urine antigen assay test negative  Molecular pneumonia panel and respiratory culture pending. Acute hypoxic respiratory failure, resolved,  secondary to #3, resolved  Imaging noted in #3  Came in hypoxic 86% RA, was placed on 3L/NC. Now weaned back to RA which is her baseline. O2 titration protocol  Continue tx's in #3  Home O2 eval performed, noting patient did not require any supplemental oxygen at rest or with exertion. Pulmonary hygiene: IS, acapella. Alcohol abuse disorder  last etOH withdrawal incident was on the 2/16/2023. Serum EtOH POA was < 0.01. PAWS score 6. Pt states she cant remember when she had her last drink. She remains without EtOH withdrawal symptoms. Continue folic acid, multivitamin and thiamine daily  Continue phenobarbital push prophylaxis prn -> did not require any doses while in the hospital. D/c at discharge. Addiction services consulted   Primary hypertension, uncontrolled (improving) w/ hypertensive urgency(resolved):   Continue losartan  Continue propranolol 10 mg 3 times daily.   As needed hydralazine for SBP greater than 165 mmHg. 3/8: BPs noted to be 200/91. Patient asymptomatic and given 5 mg IV labetalol. 3/9: BP's much improved, currently 148/84. F/u PCP within 1 week for re-evaluation of BP and BP medications. Hyperlipidemia:   Statin  NIDDM T2, diet controlled:   A1c on 2/17/2023 5.9%. Accu-Cheks. Low-dose SSI as needed. -> discontinued at discharge. Hypoglycemia protocol. Diabetic diet. Acquired hypothyroidism:   TSH 1.89 and T4 1.57 on 3/6/23. Continue home Synthroid. GERD:   PPI  Anxiety disorder:   Continue BuSpar. Continue prn Ativan and propranolol. Follow-up with outpatient psychiatry within 1 week to wean off Ativan. Pt verbalized her understanding of needing to be slowly weaned off Ativan and not to abruptly stop taking it. Hx of polysubstance abuse:   Patient is on polypharmacy with Ativan, EtOH, in addition to other psychiatric medications. Follow-up with psychiatric outpatient to wean off Ativan. We will also need to follow-up for alcohol abuse disorder. Chronic systolic CHF/HFmrEF, compensated:   Echo (2/17/2023) notable for EF 45%, hypokinetic motion of the mid anterior septal wall noted in the LV, trivial TR, trivial MI. Strict I&O's, daily weights. Continue statin, propranolol, losartan, ASA. Cardiac diet GARRY  Vitamin D insufficieny  Vitamin D level 28  Continue supplementation. The patient was seen and examined on day of discharge and this discharge summary is in conjunction with any daily progress note from day of discharge. Hospital Course:   Per HPI documented 3/7/2023: \" Kristie Zarate is a 74 y.o. female with PMHx of alcohol use disorder, bipolar affective disorder, essential hypertension, hyperlipidemia, chronic obstructive pulmonary disease, GERD who presents to [facility] with altered mental status and near syncope.   She was admitted 3 weeks ago for alcohol withdrawal.  Patient's history is reported by herself boyfriend who was in the room at that time. He states that on the morning of admission at 6 AM when he got home from the night shift. He had to look very aggressively for hard to wake up. He states that when she finally answered the door she seemed confused and did not make sense. He also states that he went to the bedroom and when he woke up she was still confused in the morning. He states that he went to store in the morning and when he got back home he found her laying on the floor. She did not lose consciousness. Patient states that she fell lightheaded and fell to the floor without losing consciousness. Her boyfriend states she was incoherent and laying on the kitchen floor. She denied shortness of breath, chest pain, fever, chills, nausea, vomiting, abdominal pains headaches, changes in vision. ED course: EKG demonstrated normal sinus rhythm without any ST changes. She also has some incomplete right bundle branch block. Chest x-ray was consistent with multifocal bilateral secondary to infection. CT head without contrast was unremarkable for acute intracranial process. CT chest with and without contrast demonstrated abnormal airspace opacity in the right middle lobe. Procalcitonin was WNL, troponin was WNL, TSH and T4 were WNL,\"     3/8/23: Resumed care of patient today. Patient sitting up in bedside recliner, nontoxic in appearance, no apparent distress. Remains afebrile, satting 96% on room air, hemodynamically stable. Patient stating she feels much better this morning. States that she is not having a cough, has no complaints of shortness of breath or chest pains. She has been weaned back to room air. Home oxygen evaluation ordered. Patient remains alert and oriented x4. However she is noted to be severely hypertensive with a blood pressure of 200/91. She was given 5 mg IV labetalol. We will keep patient overnight for further management of her blood pressures.   She was also given dose of as needed Ativan. Patient states that she takes 1 mg 4 times daily for a total of 4 mg/day. However when looking at her prescription it appears that she supposed to be taking 0.5 mg every 6 hours for maximum dose of 2 mg. Patient will need to be weaned from her Ativan. She states that she follows up with a psychiatrist outpatient and has a recent follow-up appointment with her PCP soon. If patient's blood pressures improved and she and passes her home O2 eval will likely discharge home. 3/9/23: Afebrile, satting 97% on room air, hemodynamically stable. No acute events overnight. Patient blood pressure this morning 148/84. States she is feeling better today. She denies chest pains, palpitations, dizziness, lightheadedness, shortness of breath at rest, dyspnea on exertion, cough, abdominal pain, nausea, vomiting, diarrhea, fever, chills. Patient is fully alert and oriented. Has been ambulating around her room and in the halls. States she is eating all her meals, passing gas. Yesterday afternoon patient was noted to be in hypertensive urgency. She was given 5 mg IV labetalol and had subsequent improvement in her blood pressures. Per MAR review it appears that patient had not been receiving any Ativan while in the hospital when she normally takes 1 mg 4 times daily for a total of 4 mg/day. Additionally she had not been receiving her propranolol which she takes 3 times daily. I restarted her propranolol based on her home dose and started the patient on 0.5 mg Ativan every 6 hours in which this has helped with her heart rate and her blood pressure. Suspect that she was having some mild withdrawal from not receiving her Ativan. She does not having any withdrawal symptoms at this time. Blood pressure this morning was 144/85. Patient also had home oxygen evaluation performed yesterday, which noted the patient did not require any supplemental oxygen with rest or exertion.   Patient has been being treated for pneumonia while here in the hospital.  She will be discharged with remaining doses of azithromycin to complete full course of antibiotic therapy. She was instructed to continue to take a daily multivitamin, thiamine and folic acid given her alcohol abuse disorder. She was also instructed to take vitamin D as she is noted to be mildly insufficient. Patient instructed that she needs to follow-up with her primary care doctor within 1 week upon discharge for her pneumonia and for posthospital follow-up. She was instructed to follow-up with her psychiatrist within 1 week upon discharge to assist her with weaning from [address] [place] [address]. She was instructed to continue to use her incentive spirometer and Acapella device a minimum of 10 times every hour to help exercise her lungs. She was informed that if she was to experience any new or worsening symptoms she should seek immediate medical care and can return back to the emergency department. Prior to discharge patient was provided the opportunity to ask questions. At this time patient is stable for discharge. Exam:     Vitals:  Vitals:    03/08/23 1614 03/08/23 1943 03/09/23 0300 03/09/23 0814   BP: (!) 158/96 (!) 174/96 (!) 148/84 (!) 144/85   Pulse: (!) 102 98 88 88   Resp:  18 18 18   Temp:  98.3 °F (36.8 °C) 97.9 °F (36.6 °C) 97.9 °F (36.6 °C)   TempSrc:  Oral Oral Oral   SpO2:  97% 97% 97%   Weight:       Height:         Weight: Weight: 149 lb 4 oz (67.7 kg)     24 hour intake/output:No intake or output data in the 24 hours ending 03/10/23 2209        General appearance: No apparent distress, appears stated age and cooperative. HEENT: Pupils equal, round, and reactive to light. Conjunctivae/corneas clear. Neck: Supple, with full range of motion. No jugular venous distention. Trachea midline. Respiratory:  Normal respiratory effort. Able to speak full clear sentences.   Clear to auscultation, bilaterally without Rales/Wheezes/Rhonchi. Cardiovascular: Regular rate and rhythm with normal S1/S2 without murmurs, rubs or gallops. Abdomen: Soft, non-tender, non-distended with normal bowel sounds. Musculoskeletal: passive and active ROM x 4 extremities. Skin: Skin color, texture, turgor normal.  No rashes or lesions. Neurologic:  Neurovascularly intact without any focal sensory/motor deficits. Cranial nerves: II-XII intact, grossly non-focal.  Psychiatric: Alert and oriented x 4, thought content appropriate, normal insight  Capillary Refill: Brisk,< 3 seconds   Peripheral Pulses: +2 palpable, equal bilaterally        Labs: For convenience and continuity at follow-up the following most recent labs are provided:      CBC:    Lab Results   Component Value Date/Time    WBC 6.8 03/09/2023 04:23 AM    HGB 13.2 03/09/2023 04:23 AM    HCT 42.4 03/09/2023 04:23 AM     03/09/2023 04:23 AM       Renal:    Lab Results   Component Value Date/Time     03/09/2023 04:23 AM    K 4.5 03/09/2023 04:23 AM     03/09/2023 04:23 AM    CO2 24 03/09/2023 04:23 AM    BUN 10 03/09/2023 04:23 AM    CREATININE 0.6 03/09/2023 04:23 AM    CALCIUM 9.4 03/09/2023 04:23 AM    PHOS 3.5 02/17/2023 03:53 AM       Cardiac:   No results for input(s): Nimesh Geronimo in the last 72 hours. Significant Diagnostic Studies    Radiology:   CT Head W/O Contrast   Final Result   No acute intracranial process. This document has been electronically signed by: Melva Severe. Ruvo, DO on    03/06/2023 09:29 PM      All CTs at this facility use dose modulation techniques and iterative    reconstructions, and/or weight-based dosing   when appropriate to reduce radiation to a low as reasonably achievable. CTA CHEST W WO CONTRAST   Final Result   1. No pulmonary embolus. 2. Abnormal airspace opacity in the right middle lobe suggesting segmental    pneumonia in the appropriate clinical setting.       This document has been electronically signed by: Suleman Snider DO on    03/06/2023 09:27 PM      All CTs at this facility use dose modulation techniques and iterative    reconstructions, and/or weight-based dosing   when appropriate to reduce radiation to a low as reasonably achievable. 3D Post-processing was performed on this study. XR CHEST PORTABLE   Final Result   Multifocal bilateral opacities secondary to infection or edema. This document has been electronically signed by: Suleman Snider DO on    03/06/2023 09:37 PM             Consults:     IP CONSULT TO SPIRITUAL SERVICES  IP CONSULT TO ADDICTION SERVICES    Disposition:    [x] Home       [] TCU       [] Rehab       [] Psych       [] SNF       [] [facility]       [] Other-    Condition at Discharge: Stable    Code Status:  Prior     Pending tests at discharge: Molecular pneumonia panel and sputum culture    Patient Instructions:    Discharge lab work: None  Activity: activity as tolerated  Diet: No diet orders on file      Follow-up visits:   [facility]  [address] [address]  [phone]    Follow up on 4/12/2023  Appointment time is 1:30 p.m. [facility]  [address] [place] [address]  [phone]    Follow up in 1 week(s)  Posthospital follow-up    MD Win Mireles 187    Follow up in 1 week(s)  Follow up within 1 week to discuss use of Ativan. .. Luann Quigley Left a message for the office asking them to reach ouut and schedule an appointment with the patient or to call us back. Luann Quigley ... waiting for a reply.          Discharge Medications:        Medication List        START taking these medications      azithromycin 500 MG tablet  Commonly known as: ZITHROMAX  Take 1 tablet by mouth daily for 3 days     folic acid 1 MG tablet  Commonly known as: FOLVITE  Take 1 tablet by mouth daily     Vitamin D3 25 MCG Tabs  Take 1 tablet by mouth daily            CONTINUE taking these medications alendronate 70 MG tablet  Commonly known as: FOSAMAX     aspirin 81 MG chewable tablet  Take 1 tablet by mouth daily     atorvastatin 40 MG tablet  Commonly known as: LIPITOR  Take 1 tablet by mouth every evening     busPIRone 10 MG tablet  Commonly known as: BUSPAR  Take 1 tablet by mouth 2 times daily     cetirizine 10 MG tablet  Commonly known as: ZYRTEC     fluticasone 50 MCG/ACT nasal spray  Commonly known as: FLONASE     levothyroxine 100 MCG tablet  Commonly known as: SYNTHROID  Take 1 tablet by mouth Daily     lisinopril 20 MG tablet  Commonly known as: PRINIVIL;ZESTRIL  Take 1 tablet by mouth daily     LORazepam 1 MG tablet  Commonly known as: ATIVAN  Take 0.5 tablets by mouth every 6 hours as needed for Anxiety for up to 30 days.  Take total of 0.5mg every 6 hours as needed, continue taper per outpatient provider recommendation Max Daily Amount: 2 mg     Magnesium Oxide 200 MG Tabs  Commonly known as: Mag-Oxide  Take 1 tablet by mouth in the morning and at bedtime     multivitamin Tabs tablet  Take 1 tablet by mouth daily     omeprazole 20 MG delayed release capsule  Commonly known as: PRILOSEC     propranolol 10 MG tablet  Commonly known as: INDERAL  Take 1 tablet by mouth 3 times daily     venlafaxine 75 MG extended release capsule  Commonly known as: EFFEXOR XR     vitamin B-1 100 MG tablet  Commonly known as: THIAMINE  Take 1 tablet by mouth daily            STOP taking these medications      amitriptyline 150 MG tablet  Commonly known as: ELAVIL               Where to Get Your Medications        These medications were sent to [address] [place] [address]  [address], [address] [facility]      Phone: [phone]   azithromycin 125 MG tablet  folic acid 1 MG tablet  multivitamin Tabs tablet  Vitamin D3 25 MCG Tabs         Time Spent on discharge is more than 1 hour in the examination, evaluation, counseling and review of medications and discharge plan. Signed: Thank you Merline Prost for the opportunity to be involved in this patient's care.     Electronically signed by SUSAN Patel CNP on 3/10/2023 at 10:09 PM

## 2023-03-09 NOTE — PROGRESS NOTES
Discharge instructions gone over with patient, including follow up appointments,  rx called into pharmacy,  pneumonia information gone over with patient,  she voiced understanding,  no concerns noted at this time. Patient's bottle of pills was given to her out of the locked cupboard. Slip for security was given to patient to retrieve money of hers that is locked up down there.

## 2023-03-09 NOTE — PROGRESS NOTES
[facility]  Occupational Therapy  Daily Note  Time:   Time In: 6354  Time Out: 6987  Timed Code Treatment Minutes: 11 Minutes  Minutes: 11          Date: 3/9/2023  Patient Name: Lidia Lane,   Gender: female      Room: -14/014-A  MRN: [id number]  : 1955  (74 y.o.)  Referring Practitioner: Jefe Porras DO  Diagnosis: acute respiratory failure with hypoxia  Additional Pertinent Hx: per chart review; Lidia Lane is a pleasant 74 y.o. female who presents to the emergency department from from home, by private vehicle for evaluation of altered mental status. Patient's history is provided by her boyfriend. He states that this morning at 6 AM when he came home from his night shift he had to knock very aggressively in order to wake her up. He states that when she finally answered the door she seemed confused and \"did not make sense \". He states that he went to bed and when he woke up she was still kind of confused. He had run to the store and the patient's sister was unable to get a hold of her. So then he went back home and found her laying on the floor. Patient states that she is unsure how she got on the floor. Patient's boyfriend states that she was awake but incoherent laying on the kitchen floor. He did not notice any blood or injury. He states that she was here approximately 3 weeks ago and was told that she had alcohol withdrawal.  He states she was admitted for a few days and given medication for alcohol withdrawal.  He states that the time they discontinued her Ambien and started her on some other medications. He is unsure if secondary to these medications as the reason that she continues to have altered mental status and falls. He denies any recent fever, vomiting, diarrhea, or cough. Restrictions/Precautions:  Restrictions/Precautions: Fall Risk, General Precautions     SUBJECTIVE: Nurse approved Tx. Pt agreeable to Tx.      PAIN: not stated Vitals: Vitals not assessed per clinical judgement, see nursing flowsheet    COGNITION: Decreased Recall, Decreased Insight, and Impaired Memory  **pt thought therapist was another pt \"taking me for a walk. \"     ADL:   No ADL's completed this session. Eligio Cooley BALANCE:  Sitting Balance:  Supervision. Standing Balance: Stand By Assistance. BED MOBILITY:  Not Tested    TRANSFERS:  Sit to Stand:  Supervision. Stand to Sit: Supervision. FUNCTIONAL MOBILITY:  Assistive Device: None  Assist Level:  Contact Guard Assistance. Distance: To and from bathroom and around nurse station   0 LOB assist to manage IV pole      ADDITIONAL ACTIVITIES:  Reports 0 concerns with d/c plan home. Reports she can perform ADLs at home. ASSESSMENT:     Activity Tolerance:  Patient tolerance of  treatment: Good treatment tolerance       Discharge Recommendations: Home with assist as needed  Equipment Recommendations: Equipment Needed: No  Plan: Times Per Week: 3-5x  Times Per Day: Once a day  Current Treatment Recommendations: Strengthening, Balance training, Functional mobility training, Endurance training, Safety education & training, Neuromuscular re-education, Patient/Caregiver education & training, Equipment evaluation, education, & procurement, Self-Care / ADL    Patient Education  Patient Education: Role of OT and Plan of Care     Goals  Short Term Goals  Time Frame for Short Term Goals: by discharge  Short Term Goal 1: patient will tolerate 6 min functional standing with (S) to increase activity tolerance for ADL routine. Short Term Goal 2: patient will functionally ambulate house hold distances with (S) and least restrictive device. Short Term Goal 3: patient will complete ADL routine with MOD I and 0-1 cues for safety and sequencing. Short Term Goal 4: patient will participate in moderate resistive UB exer to increase UB strength for functional transfers.     Following session, patient left in safe position with all fall risk precautions in place.

## 2023-03-09 NOTE — PROGRESS NOTES
[address] [place] [address]  SPEECH THERAPY  STRZ ONC MED 5K  Speech - Language - Cognitive Evaluation    SLP Individual Minutes  Time In: 0760  Time Out: 4914  Minutes: 24  Timed Code Treatment Minutes: 0 Minutes       Date: 3/9/2023  Patient Name: Angelica Wolfe      CSN: [id number]   : 1955  (74 y.o.)  Gender: female   Referring Physician:  SUSAN Farley CNP  Diagnosis: Hypoxia   Precautions: Fall risk   History of Present Illness/Injury: Patient admitted to [facility] for above dx. Per chart review, \"patient is a 74 y.o. female with PMHx of alcohol use disorder, bipolar affective disorder, essential hypertension, hyperlipidemia, chronic obstructive pulmonary disease, GERD who presents to [address] [place] [address] with altered mental status and near syncope. She was admitted 3 weeks ago for alcohol withdrawal.  Patient's history is reported by herself boyfriend who was in the room at that time. He states that on the morning of admission at 6 AM when he got home from the night shift. He had to look very aggressively for hard to wake up. He states that when she finally answered the door she seemed confused and did not make sense. He also states that he went to the bedroom and when he woke up she was still confused in the morning. He states that he went to store in the morning and when he got back home he found her laying on the floor. She did not lose consciousness. Patient states that she fell lightheaded and fell to the floor without losing consciousness. Her boyfriend states she was incoherent and laying on the kitchen floor. She denied shortness of breath, chest pain, fever, chills, nausea, vomiting, abdominal pains headaches, changes in vision. \"    ST consulted for cognitive linguistic evaluation due to hx of falls and to assist with discharge planning.      Past Medical History:   Diagnosis Date    Anxiety     COPD (chronic obstructive pulmonary disease) (HCC)     GERD (gastroesophageal reflux disease)     Hyperlipidemia     Hypertension     Psychiatric problem     Seizures (HCC)     Thyroid disease        Pain: No pain reported. Subjective:  Spoke with RN Larry Sultana for approval of evaluation. Upon arrival, patient was sitting in bed ripping her leads off stating \"I am ready to get out of here. \" She was alert and cooperative through the evaluation. SOCIAL HISTORY:   [facility] boyfriend   Work History: Retired  Education Level: [employer] Status: Active   Finance Management: Independent  Medication Management: Independent  ADL's: Independent. Hobbies: Photography   Vision Status: Reading glasses  Hearing: WFL  Type of Home: House  Home Layout: One level  Home Access: Level entry    SPEECH / VOICE:  Speech and Voice appear to be grossly intact for basic and complex daily communication    LANGUAGE:  Receptive:  Receptive language skills appear to be grossly intact for basic and complex daily communication. Expressive:  Expressive language skills appear to be grossly intact for basic and complex daily communication. COGNITION:  [place] Cognitive [facility]) version 7.1 completed. Patient scored 19/30. Normal is greater than or equal to 26/30. Orientation: 6/6  Immediate Recall: 3/5  Short-Term Recall: 25  Divergent Namin/11 members  Reasonin  Sequencin/2  Thought Organization: Intact for basic wants and needs  Insight: Fair  Attention: Fair  Math Computation: 0/3  Executive Functionin/5    SWALLOWING:  Current Diet: Regular diet, thin liquids     RECOMMENDATIONS/ASSESSMENT:  DIAGNOSTIC IMPRESSIONS:  Patient presents with a mild cognitive impairment as derived from a 19/30 MOCA score. Deficits are outlined above. Expressive and receptive language are grossly intact. Speech intelligibility best approximates at 100%, no noted dysarthria.  Patient with poor effort on cognitive assessment this date, often stating \"I don't want to do that, but if I wanted to I could. \" Outpatient vs home health speech therapy services were explained to the patient; she wasn't agreeable to either stating, \"I don't want any of this. I just don't feel like trying right now. \" Patient is highly independent at home. No further skilled ST services are recommended. Please re-consult if anything changes. Rehabilitation Potential: good  Discharge Recommendations:  Home    EDUCATION:  Learner: Patient  Education:  Reviewed results and recommendations of this evaluation  Evaluation of Education: Verbalizes understanding    PLAN:  No further speech therapy services indicated. PATIENT GOAL:    Return to prior level of function.     [address] [facility] Student Speech Intern   ALLISON Lanmartina

## 2023-03-09 NOTE — CARE COORDINATION
3/9/23, 10:51 AM EST    Patient goals/plan/ treatment preferences discussed by  and . Patient goals/plan/ treatment preferences reviewed with patient/ family. Patient/ family verbalize understanding of discharge plan and are in agreement with goal/plan/treatment preferences. Understanding was demonstrated using the teach back method. AVS provided by RN at time of discharge, which includes all necessary medical information pertaining to the patients current course of illness, treatment, post-discharge goals of care, and treatment preferences. Services At/After Discharge: None       IMM Letter  IMM Letter given to Patient/Family/Significant other/Guardian/POA/by[de-identified] Elizabeth Mensah RN Case Mgr. IMM Letter date given[de-identified] 03/09/23  IMM Letter time given[de-identified] 0     Pt verbalized understanding and gave permission for possible discharge within 4 hours of receiving IMM.

## 2023-03-11 LAB
BACTERIA BLD AEROBE CULT: NORMAL
BACTERIA BLD AEROBE CULT: NORMAL

## 2023-03-27 ENCOUNTER — HOSPITAL ENCOUNTER (INPATIENT)
Age: 68
LOS: 1 days | Discharge: HOME | DRG: 206 | End: 2023-03-30
Attending: HOSPITALIST | Admitting: HOSPITALIST
Payer: MEDICARE

## 2023-03-27 ENCOUNTER — APPOINTMENT (OUTPATIENT)
Dept: CT IMAGING | Age: 68
DRG: 206 | End: 2023-03-27
Payer: MEDICARE

## 2023-03-27 ENCOUNTER — APPOINTMENT (OUTPATIENT)
Dept: GENERAL RADIOLOGY | Age: 68
DRG: 206 | End: 2023-03-27
Payer: MEDICARE

## 2023-03-27 DIAGNOSIS — T17.908S: ICD-10-CM

## 2023-03-27 DIAGNOSIS — T17.908D ASPIRATION INTO AIRWAY, SUBSEQUENT ENCOUNTER: Primary | ICD-10-CM

## 2023-03-27 DIAGNOSIS — J96.01 ACUTE RESPIRATORY FAILURE WITH HYPOXIA (HCC): ICD-10-CM

## 2023-03-27 DIAGNOSIS — J18.9: ICD-10-CM

## 2023-03-27 DIAGNOSIS — T17.908A ASPIRATION INTO AIRWAY, INITIAL ENCOUNTER: ICD-10-CM

## 2023-03-27 LAB
ALBUMIN SERPL BCG-MCNC: 3.8 G/DL (ref 3.5–5.1)
ALP SERPL-CCNC: 111 U/L (ref 38–126)
ALT SERPL W/O P-5'-P-CCNC: 20 U/L (ref 11–66)
ANION GAP SERPL CALC-SCNC: 12 MEQ/L (ref 8–16)
AST SERPL-CCNC: 14 U/L (ref 5–40)
BASOPHILS ABSOLUTE: 0 THOU/MM3 (ref 0–0.1)
BASOPHILS NFR BLD AUTO: 0.5 %
BILIRUB SERPL-MCNC: 0.3 MG/DL (ref 0.3–1.2)
BUN SERPL-MCNC: 7 MG/DL (ref 7–22)
CALCIUM SERPL-MCNC: 8.9 MG/DL (ref 8.5–10.5)
CHLORIDE SERPL-SCNC: 102 MEQ/L (ref 98–111)
CO2 SERPL-SCNC: 27 MEQ/L (ref 23–33)
CREAT SERPL-MCNC: 0.6 MG/DL (ref 0.4–1.2)
DEPRECATED RDW RBC AUTO: 42.2 FL (ref 35–45)
EOSINOPHIL NFR BLD AUTO: 1.9 %
EOSINOPHILS ABSOLUTE: 0.2 THOU/MM3 (ref 0–0.4)
ERYTHROCYTE [DISTWIDTH] IN BLOOD BY AUTOMATED COUNT: 12.5 % (ref 11.5–14.5)
GFR SERPL CREATININE-BSD FRML MDRD: > 60 ML/MIN/1.73M2
GLUCOSE SERPL-MCNC: 138 MG/DL (ref 70–108)
HCT VFR BLD AUTO: 38 % (ref 37–47)
HGB BLD-MCNC: 12.2 GM/DL (ref 12–16)
IMM GRANULOCYTES # BLD AUTO: 0.03 THOU/MM3 (ref 0–0.07)
IMM GRANULOCYTES NFR BLD AUTO: 0.3 %
LACTATE SERPL-SCNC: 1.8 MMOL/L (ref 0.5–2)
LYMPHOCYTES ABSOLUTE: 1.9 THOU/MM3 (ref 1–4.8)
LYMPHOCYTES NFR BLD AUTO: 22 %
MCH RBC QN AUTO: 29.7 PG (ref 26–33)
MCHC RBC AUTO-ENTMCNC: 32.1 GM/DL (ref 32.2–35.5)
MCV RBC AUTO: 92.5 FL (ref 81–99)
MONOCYTES ABSOLUTE: 0.7 THOU/MM3 (ref 0.4–1.3)
MONOCYTES NFR BLD AUTO: 8.1 %
NEUTROPHILS NFR BLD AUTO: 67.2 %
NRBC BLD AUTO-RTO: 0 /100 WBC
NT-PROBNP SERPL IA-MCNC: 119.4 PG/ML (ref 0–124)
OSMOLALITY SERPL CALC.SUM OF ELEC: 281.4 MOSMOL/KG (ref 275–300)
PLATELET # BLD AUTO: 219 THOU/MM3 (ref 130–400)
PMV BLD AUTO: 9.4 FL (ref 9.4–12.4)
POTASSIUM SERPL-SCNC: 3.8 MEQ/L (ref 3.5–5.2)
PROCALCITONIN SERPL IA-MCNC: 0.04 NG/ML (ref 0.01–0.09)
PROT SERPL-MCNC: 6.4 G/DL (ref 6.1–8)
RBC # BLD AUTO: 4.11 MILL/MM3 (ref 4.2–5.4)
SEGMENTED NEUTROPHILS ABSOLUTE COUNT: 5.9 THOU/MM3 (ref 1.8–7.7)
SODIUM SERPL-SCNC: 141 MEQ/L (ref 135–145)
WBC # BLD AUTO: 8.8 THOU/MM3 (ref 4.8–10.8)

## 2023-03-27 PROCEDURE — 71046 X-RAY EXAM CHEST 2 VIEWS: CPT

## 2023-03-27 PROCEDURE — 71250 CT THORAX DX C-: CPT

## 2023-03-27 PROCEDURE — 94640 AIRWAY INHALATION TREATMENT: CPT

## 2023-03-27 PROCEDURE — 6360000002 HC RX W HCPCS: Performed by: PHYSICIAN ASSISTANT

## 2023-03-27 PROCEDURE — 83605 ASSAY OF LACTIC ACID: CPT

## 2023-03-27 PROCEDURE — 83880 ASSAY OF NATRIURETIC PEPTIDE: CPT

## 2023-03-27 PROCEDURE — 36415 COLL VENOUS BLD VENIPUNCTURE: CPT

## 2023-03-27 PROCEDURE — 84145 PROCALCITONIN (PCT): CPT

## 2023-03-27 PROCEDURE — 80053 COMPREHEN METABOLIC PANEL: CPT

## 2023-03-27 PROCEDURE — G0378 HOSPITAL OBSERVATION PER HR: HCPCS

## 2023-03-27 PROCEDURE — 6370000000 HC RX 637 (ALT 250 FOR IP): Performed by: PHYSICIAN ASSISTANT

## 2023-03-27 PROCEDURE — 99285 EMERGENCY DEPT VISIT HI MDM: CPT

## 2023-03-27 PROCEDURE — 6370000000 HC RX 637 (ALT 250 FOR IP): Performed by: STUDENT IN AN ORGANIZED HEALTH CARE EDUCATION/TRAINING PROGRAM

## 2023-03-27 PROCEDURE — 85025 COMPLETE CBC W/AUTO DIFF WBC: CPT

## 2023-03-27 PROCEDURE — 2580000003 HC RX 258: Performed by: STUDENT IN AN ORGANIZED HEALTH CARE EDUCATION/TRAINING PROGRAM

## 2023-03-27 PROCEDURE — 99223 1ST HOSP IP/OBS HIGH 75: CPT | Performed by: HOSPITALIST

## 2023-03-27 RX ORDER — ACETAMINOPHEN 325 MG/1
650 TABLET ORAL EVERY 6 HOURS PRN
Status: DISCONTINUED | OUTPATIENT
Start: 2023-03-27 | End: 2023-03-30 | Stop reason: HOSPADM

## 2023-03-27 RX ORDER — DESOXIMETASONE 0.5 MG/G
1 CREAM TOPICAL 2 TIMES DAILY
COMMUNITY
Start: 2023-03-24

## 2023-03-27 RX ORDER — PROPRANOLOL HYDROCHLORIDE 10 MG/1
10 TABLET ORAL 3 TIMES DAILY
Status: DISCONTINUED | OUTPATIENT
Start: 2023-03-27 | End: 2023-03-30 | Stop reason: HOSPADM

## 2023-03-27 RX ORDER — ASPIRIN 81 MG/1
81 TABLET, CHEWABLE ORAL DAILY
Status: DISCONTINUED | OUTPATIENT
Start: 2023-03-27 | End: 2023-03-30 | Stop reason: HOSPADM

## 2023-03-27 RX ORDER — LORAZEPAM 1 MG/1
1 TABLET ORAL EVERY 6 HOURS PRN
Status: DISCONTINUED | OUTPATIENT
Start: 2023-03-27 | End: 2023-03-30 | Stop reason: HOSPADM

## 2023-03-27 RX ORDER — ATORVASTATIN CALCIUM 40 MG/1
40 TABLET, FILM COATED ORAL EVERY EVENING
Status: DISCONTINUED | OUTPATIENT
Start: 2023-03-27 | End: 2023-03-30 | Stop reason: HOSPADM

## 2023-03-27 RX ORDER — CETIRIZINE HYDROCHLORIDE 10 MG/1
1 TABLET ORAL DAILY
COMMUNITY

## 2023-03-27 RX ORDER — PROPRANOLOL HYDROCHLORIDE 10 MG/1
10 TABLET ORAL ONCE
Status: COMPLETED | OUTPATIENT
Start: 2023-03-27 | End: 2023-03-27

## 2023-03-27 RX ORDER — SODIUM CHLORIDE 9 MG/ML
INJECTION, SOLUTION INTRAVENOUS PRN
Status: DISCONTINUED | OUTPATIENT
Start: 2023-03-27 | End: 2023-03-30 | Stop reason: HOSPADM

## 2023-03-27 RX ORDER — ALBUTEROL SULFATE 2.5 MG/3ML
2.5 SOLUTION RESPIRATORY (INHALATION)
Status: DISCONTINUED | OUTPATIENT
Start: 2023-03-28 | End: 2023-03-27 | Stop reason: CLARIF

## 2023-03-27 RX ORDER — ACETAMINOPHEN 650 MG/1
650 SUPPOSITORY RECTAL EVERY 6 HOURS PRN
Status: DISCONTINUED | OUTPATIENT
Start: 2023-03-27 | End: 2023-03-30 | Stop reason: HOSPADM

## 2023-03-27 RX ORDER — OXYBUTYNIN CHLORIDE 5 MG/1
5 TABLET ORAL 2 TIMES DAILY
Status: DISCONTINUED | OUTPATIENT
Start: 2023-03-27 | End: 2023-03-30 | Stop reason: HOSPADM

## 2023-03-27 RX ORDER — AMITRIPTYLINE HYDROCHLORIDE 150 MG/1
150 TABLET, FILM COATED ORAL NIGHTLY
COMMUNITY
Start: 2023-03-22

## 2023-03-27 RX ORDER — ALBUTEROL SULFATE 90 UG/1
2 AEROSOL, METERED RESPIRATORY (INHALATION)
Status: DISCONTINUED | OUTPATIENT
Start: 2023-03-28 | End: 2023-03-27

## 2023-03-27 RX ORDER — POLYETHYLENE GLYCOL 3350 17 G/17G
17 POWDER, FOR SOLUTION ORAL DAILY PRN
Status: DISCONTINUED | OUTPATIENT
Start: 2023-03-27 | End: 2023-03-30 | Stop reason: HOSPADM

## 2023-03-27 RX ORDER — LEVOTHYROXINE SODIUM 0.1 MG/1
100 TABLET ORAL DAILY
Status: DISCONTINUED | OUTPATIENT
Start: 2023-03-28 | End: 2023-03-30 | Stop reason: HOSPADM

## 2023-03-27 RX ORDER — ALBUTEROL SULFATE 90 UG/1
2 AEROSOL, METERED RESPIRATORY (INHALATION) EVERY 4 HOURS PRN
Status: DISCONTINUED | OUTPATIENT
Start: 2023-03-27 | End: 2023-03-30 | Stop reason: HOSPADM

## 2023-03-27 RX ORDER — METOCLOPRAMIDE 10 MG/1
5 TABLET ORAL
Status: DISCONTINUED | OUTPATIENT
Start: 2023-03-27 | End: 2023-03-30 | Stop reason: HOSPADM

## 2023-03-27 RX ORDER — LORAZEPAM 1 MG/1
1 TABLET ORAL EVERY 6 HOURS PRN
COMMUNITY

## 2023-03-27 RX ORDER — LISINOPRIL 20 MG/1
20 TABLET ORAL DAILY
Status: DISCONTINUED | OUTPATIENT
Start: 2023-03-27 | End: 2023-03-30 | Stop reason: HOSPADM

## 2023-03-27 RX ORDER — ONDANSETRON 2 MG/ML
4 INJECTION INTRAMUSCULAR; INTRAVENOUS EVERY 6 HOURS PRN
Status: DISCONTINUED | OUTPATIENT
Start: 2023-03-27 | End: 2023-03-30 | Stop reason: HOSPADM

## 2023-03-27 RX ORDER — METOCLOPRAMIDE 5 MG/1
5 TABLET ORAL 4 TIMES DAILY PRN
COMMUNITY
Start: 2023-03-04

## 2023-03-27 RX ORDER — ALBUTEROL SULFATE 90 UG/1
2 AEROSOL, METERED RESPIRATORY (INHALATION) 3 TIMES DAILY
Status: DISCONTINUED | OUTPATIENT
Start: 2023-03-27 | End: 2023-03-30

## 2023-03-27 RX ORDER — CETIRIZINE HYDROCHLORIDE 10 MG/1
10 TABLET ORAL DAILY
Status: DISCONTINUED | OUTPATIENT
Start: 2023-03-27 | End: 2023-03-30 | Stop reason: HOSPADM

## 2023-03-27 RX ORDER — ONDANSETRON 4 MG/1
4 TABLET, ORALLY DISINTEGRATING ORAL EVERY 8 HOURS PRN
Status: DISCONTINUED | OUTPATIENT
Start: 2023-03-27 | End: 2023-03-30 | Stop reason: HOSPADM

## 2023-03-27 RX ORDER — SODIUM CHLORIDE 0.9 % (FLUSH) 0.9 %
5-40 SYRINGE (ML) INJECTION EVERY 12 HOURS SCHEDULED
Status: DISCONTINUED | OUTPATIENT
Start: 2023-03-27 | End: 2023-03-30 | Stop reason: HOSPADM

## 2023-03-27 RX ORDER — SODIUM CHLORIDE 0.9 % (FLUSH) 0.9 %
5-40 SYRINGE (ML) INJECTION PRN
Status: DISCONTINUED | OUTPATIENT
Start: 2023-03-27 | End: 2023-03-30 | Stop reason: HOSPADM

## 2023-03-27 RX ORDER — OXYBUTYNIN CHLORIDE 5 MG/1
5 TABLET ORAL 2 TIMES DAILY
COMMUNITY
Start: 2023-01-07

## 2023-03-27 RX ORDER — PANTOPRAZOLE SODIUM 40 MG/1
40 TABLET, DELAYED RELEASE ORAL
Status: DISCONTINUED | OUTPATIENT
Start: 2023-03-28 | End: 2023-03-30 | Stop reason: HOSPADM

## 2023-03-27 RX ORDER — AMITRIPTYLINE HYDROCHLORIDE 75 MG/1
150 TABLET, FILM COATED ORAL NIGHTLY
Status: DISCONTINUED | OUTPATIENT
Start: 2023-03-27 | End: 2023-03-30 | Stop reason: HOSPADM

## 2023-03-27 RX ADMIN — SODIUM CHLORIDE, PRESERVATIVE FREE 10 ML: 5 INJECTION INTRAVENOUS at 22:35

## 2023-03-27 RX ADMIN — PROPRANOLOL HYDROCHLORIDE 10 MG: 10 TABLET ORAL at 22:32

## 2023-03-27 RX ADMIN — LISINOPRIL 20 MG: 20 TABLET ORAL at 22:32

## 2023-03-27 RX ADMIN — ATORVASTATIN CALCIUM 40 MG: 40 TABLET, FILM COATED ORAL at 22:32

## 2023-03-27 RX ADMIN — PROPRANOLOL HYDROCHLORIDE 10 MG: 10 TABLET ORAL at 18:29

## 2023-03-27 RX ADMIN — AMITRIPTYLINE HYDROCHLORIDE 150 MG: 75 TABLET, FILM COATED ORAL at 22:32

## 2023-03-27 RX ADMIN — METOCLOPRAMIDE 5 MG: 10 TABLET ORAL at 22:32

## 2023-03-27 RX ADMIN — OXYBUTYNIN CHLORIDE 5 MG: 5 TABLET ORAL at 22:32

## 2023-03-27 RX ADMIN — ACETAMINOPHEN 650 MG: 325 TABLET ORAL at 22:33

## 2023-03-27 RX ADMIN — ALBUTEROL SULFATE 2.5 MG: 2.5 SOLUTION RESPIRATORY (INHALATION) at 16:09

## 2023-03-27 ASSESSMENT — PAIN SCALES - GENERAL: PAINLEVEL_OUTOF10: 1

## 2023-03-27 ASSESSMENT — PAIN - FUNCTIONAL ASSESSMENT
PAIN_FUNCTIONAL_ASSESSMENT: ACTIVITIES ARE NOT PREVENTED

## 2023-03-27 ASSESSMENT — PAIN DESCRIPTION - DESCRIPTORS: DESCRIPTORS: ACHING

## 2023-03-27 ASSESSMENT — PAIN DESCRIPTION - LOCATION: LOCATION: HEAD

## 2023-03-27 ASSESSMENT — PAIN DESCRIPTION - ONSET: ONSET: ON-GOING

## 2023-03-27 ASSESSMENT — LIFESTYLE VARIABLES
HOW MANY STANDARD DRINKS CONTAINING ALCOHOL DO YOU HAVE ON A TYPICAL DAY: PATIENT DOES NOT DRINK
HOW OFTEN DO YOU HAVE A DRINK CONTAINING ALCOHOL: NEVER

## 2023-03-27 ASSESSMENT — PAIN DESCRIPTION - PAIN TYPE: TYPE: ACUTE PAIN

## 2023-03-27 ASSESSMENT — PAIN DESCRIPTION - FREQUENCY: FREQUENCY: INTERMITTENT

## 2023-03-27 NOTE — H&P
Hospitalist History and Physical      Patient:  Wade Roe  YOB: 1955    MRN: [id number]     Acct: [de-identified]    PCP: Dione Ohara    Date of Admission: 3/27/2023    Date of Service: Pt seen/examined on 03/27/23  and Admitted to Observation with expected LOS less than two midnights due to medical therapy. Chief Complaint:  shortness of breath    ASSESSMENT/PLAN:    Foreign body Aspiration: no evidence of active infection at time of evaluation  -pulmonology consult pending  -plan for possible bronchoscopy in AM  -NPO after midnight  -CT pending to assess for foreign body  -will monitor off Abx   -check CBC in AM  COPD: inspiratory wheezes on exam. Suspect that these are related to foreign body rather than exacerbation however we will continue scheduled DuoNebs Q4wa  GERD: continue PPI and Reglan. Patient noted to have  Thyroid disease: continue Synthroid  Essential Tremor: on propanolol  Primary HTN: continue home lisinopril and propanolol  HLD: continue statin  Depression/anxiety: continue Ativan as this is chronic home dose    History Of Present Illness:    Wade Roe is a 74 y.o. female who presents to [facility] ED 3/27/2023 with shortness of breath and coughing. Patient is a former smoker with a PMH significant for COPD, GERD, anxiety, depression and hypothyroidism. Patient reports that she has had progressive coughing and choking following an aspiration event 1 month prior. She reports that she choked on and aspirated a peanut at that time. She recently was seen and evaluated at [facility] where a CT scan showed a foreign body in the right lung. She was admitted to the [facility] on 3/25/2023 and underwent bronchoscopy on 3/26/2023. She reports a traumatic experience and believes she did not receive adequate sedation and that the foreign body was not successfully retrieved.   The pulmonologist covering the ICU refer the patient to Dr. Trev Gastelum however he was unable to come NEGATIVE 02/18/2023 02:11 PM    SPECGRAV 1.007 02/18/2023 02:11 PM    GLUCOSEU NEGATIVE 02/17/2023 06:34 PM       Intake & Output:  No intake/output data recorded. No intake/output data recorded. Radiology:   XR CHEST (2 VW)   Final Result   Stable radiographic appearance of the chest. No evidence of an acute process. **This report has been created using voice recognition software. It may contain minor errors which are inherent in voice recognition technology. **      Final report electronically signed by Dr. Kareen Amaya on 3/27/2023 4:13 PM            XR CHEST (2 VW)   Final Result   Stable radiographic appearance of the chest. No evidence of an acute process. **This report has been created using voice recognition software. It may contain minor errors which are inherent in voice recognition technology. **      Final report electronically signed by Dr. Kareen Amaya on 3/27/2023 4:13 PM           DVT prophylaxis: SCD's    Code Status: Prior      Disposition:Home        Thank you Larissa Kenny for the opportunity to be involved in this patient's care.     Electronically signed by Linette Mack DO PGY-3 on 3/27/2023 at 8:03 PM

## 2023-03-27 NOTE — ED NOTES
ED to inpatient nurses report    Chief Complaint   Patient presents with    Foreign Body     Peanut in lung        Present to ED from home  LOC: alert and orientated to name, place, date  Vital signs   Vitals:    03/27/23 1455 03/27/23 1609 03/27/23 1829 03/27/23 1944   BP: (!) 154/80  (!) 146/80 134/84   Pulse: (!) 114  (!) 109 95   Resp: 18   16   Temp: 98.3 °F (36.8 °C)      TempSrc: Oral      SpO2: 97% (!) 89%  95%   Weight: 149 lb (67.6 kg)      Height: 5' 5\" (1.651 m)         Oxygen Baseline room air    Current needs required none   LDAs:   Peripheral IV 03/27/23 Right Antecubital (Active)   Site Assessment Clean, dry & intact 03/27/23 1827   Line Status Blood return noted; Flushed;Normal saline locked 03/27/23 1827   Phlebitis Assessment No symptoms 03/27/23 1827   Infiltration Assessment 0 03/27/23 1827     Mobility: Independent  Pending ED orders: none  Present condition: stable    Our promise was given to patient    C-SSRS Risk of Suicide: No Risk  Swallow Screening    Preferred Language: English     Electronically signed by Maksim Bliss RN on 3/27/2023 at 7:45 PM       [facility] Bliss[facility]  03/27/23 1945

## 2023-03-27 NOTE — ED PROVIDER NOTES
Tachycardia present. Heart sounds: Normal heart sounds. No murmur heard. Pulmonary:      Effort: No respiratory distress. Breath sounds: Examination of the right-upper field reveals wheezing. Examination of the left-upper field reveals wheezing. Examination of the right-middle field reveals wheezing. Examination of the left-middle field reveals wheezing. Examination of the right-lower field reveals wheezing. Examination of the left-lower field reveals wheezing. Wheezing present. Comments: Mild conversational dyspnea  Abdominal:      Palpations: Abdomen is soft. Tenderness: There is no abdominal tenderness. There is no guarding. Musculoskeletal:      Cervical back: Normal range of motion. Skin:     General: Skin is warm and dry. Neurological:      General: No focal deficit present. Mental Status: She is alert and oriented to person, place, and time. Psychiatric:         Mood and Affect: Mood normal.       FORMAL DIAGNOSTIC RESULTS     RADIOLOGY: Interpretation per the Radiologist below, if available at the time of this note (none if blank):    XR CHEST (2 VW)   Final Result   Stable radiographic appearance of the chest. No evidence of an acute process. **This report has been created using voice recognition software. It may contain minor errors which are inherent in voice recognition technology. **      Final report electronically signed by Dr. Jem Early on 3/27/2023 4:13 PM          LABS: (none if blank)  Labs Reviewed   CBC WITH AUTO DIFFERENTIAL - Abnormal; Notable for the following components:       Result Value    RBC 4.11 (*)     MCHC 32.1 (*)     All other components within normal limits   COMPREHENSIVE METABOLIC PANEL W/ REFLEX TO MG FOR LOW K - Abnormal; Notable for the following components:    Glucose 138 (*)     All other components within normal limits   LACTIC ACID   PROCALCITONIN   BRAIN NATRIURETIC PEPTIDE   ANION GAP   OSMOLALITY   GLOMERULAR FILTRATION PM      OUTPATIENT FOLLOW UP THE PATIENT:  No follow-up provider specified.     ELTON Cummings PA-C  03/27/23 5620

## 2023-03-27 NOTE — ED TRIAGE NOTES
Pt presents to the ED through triage with c/o peanut in lung. Pt states she aspirated a peanut around one month ago and had a CT scan done in which showed the peanut was still in her lungs. pt was recently hospitalized at [facility] in the ICU. Pt states she had a procedure done in which they attempted to remove the peanut from her lung but \"part of it was still left in the left side\". Family at bedside and states pt was discharged yesterday and has since been having worsening symptoms.  Pt states she is having difficulty breathing- oxygen sat stable upon arrival.

## 2023-03-27 NOTE — ED NOTES
Pt resting on chair watching tv at this time, pt remains alert and oriented, respirations are equal and unlabored. No distress noted.  Will continue to monitor      Marielena Yarbrough RN  03/27/23 1946

## 2023-03-28 LAB
ANION GAP SERPL CALC-SCNC: 9 MEQ/L (ref 8–16)
BASOPHILS ABSOLUTE: 0 THOU/MM3 (ref 0–0.1)
BASOPHILS NFR BLD AUTO: 0.5 %
BUN SERPL-MCNC: 6 MG/DL (ref 7–22)
CALCIUM SERPL-MCNC: 8.9 MG/DL (ref 8.5–10.5)
CHLORIDE SERPL-SCNC: 106 MEQ/L (ref 98–111)
CO2 SERPL-SCNC: 28 MEQ/L (ref 23–33)
CREAT SERPL-MCNC: 0.5 MG/DL (ref 0.4–1.2)
DEPRECATED RDW RBC AUTO: 41 FL (ref 35–45)
EOSINOPHIL NFR BLD AUTO: 3.8 %
EOSINOPHILS ABSOLUTE: 0.3 THOU/MM3 (ref 0–0.4)
ERYTHROCYTE [DISTWIDTH] IN BLOOD BY AUTOMATED COUNT: 12.4 % (ref 11.5–14.5)
GFR SERPL CREATININE-BSD FRML MDRD: > 60 ML/MIN/1.73M2
GLUCOSE SERPL-MCNC: 131 MG/DL (ref 70–108)
HCT VFR BLD AUTO: 37.4 % (ref 37–47)
HGB BLD-MCNC: 12 GM/DL (ref 12–16)
IMM GRANULOCYTES # BLD AUTO: 0.03 THOU/MM3 (ref 0–0.07)
IMM GRANULOCYTES NFR BLD AUTO: 0.4 %
LYMPHOCYTES ABSOLUTE: 1.8 THOU/MM3 (ref 1–4.8)
LYMPHOCYTES NFR BLD AUTO: 24.4 %
MCH RBC QN AUTO: 29.4 PG (ref 26–33)
MCHC RBC AUTO-ENTMCNC: 32.1 GM/DL (ref 32.2–35.5)
MCV RBC AUTO: 91.7 FL (ref 81–99)
MONOCYTES ABSOLUTE: 0.7 THOU/MM3 (ref 0.4–1.3)
MONOCYTES NFR BLD AUTO: 9 %
MRSA DNA SPEC QL NAA+PROBE: NEGATIVE
NEUTROPHILS NFR BLD AUTO: 61.9 %
NRBC BLD AUTO-RTO: 0 /100 WBC
PLATELET # BLD AUTO: 210 THOU/MM3 (ref 130–400)
PMV BLD AUTO: 9.9 FL (ref 9.4–12.4)
POTASSIUM SERPL-SCNC: 4 MEQ/L (ref 3.5–5.2)
RBC # BLD AUTO: 4.08 MILL/MM3 (ref 4.2–5.4)
SEGMENTED NEUTROPHILS ABSOLUTE COUNT: 4.6 THOU/MM3 (ref 1.8–7.7)
SODIUM SERPL-SCNC: 143 MEQ/L (ref 135–145)
WBC # BLD AUTO: 7.4 THOU/MM3 (ref 4.8–10.8)

## 2023-03-28 PROCEDURE — 36415 COLL VENOUS BLD VENIPUNCTURE: CPT

## 2023-03-28 PROCEDURE — 6370000000 HC RX 637 (ALT 250 FOR IP): Performed by: HOSPITALIST

## 2023-03-28 PROCEDURE — 99223 1ST HOSP IP/OBS HIGH 75: CPT | Performed by: INTERNAL MEDICINE

## 2023-03-28 PROCEDURE — G0378 HOSPITAL OBSERVATION PER HR: HCPCS

## 2023-03-28 PROCEDURE — 6370000000 HC RX 637 (ALT 250 FOR IP): Performed by: STUDENT IN AN ORGANIZED HEALTH CARE EDUCATION/TRAINING PROGRAM

## 2023-03-28 PROCEDURE — 85025 COMPLETE CBC W/AUTO DIFF WBC: CPT

## 2023-03-28 PROCEDURE — 94640 AIRWAY INHALATION TREATMENT: CPT

## 2023-03-28 PROCEDURE — 87641 MR-STAPH DNA AMP PROBE: CPT

## 2023-03-28 PROCEDURE — 80048 BASIC METABOLIC PNL TOTAL CA: CPT

## 2023-03-28 PROCEDURE — 99232 SBSQ HOSP IP/OBS MODERATE 35: CPT | Performed by: HOSPITALIST

## 2023-03-28 PROCEDURE — 2580000003 HC RX 258: Performed by: STUDENT IN AN ORGANIZED HEALTH CARE EDUCATION/TRAINING PROGRAM

## 2023-03-28 RX ADMIN — METOCLOPRAMIDE 5 MG: 10 TABLET ORAL at 05:56

## 2023-03-28 RX ADMIN — METOCLOPRAMIDE 5 MG: 10 TABLET ORAL at 11:25

## 2023-03-28 RX ADMIN — AMITRIPTYLINE HYDROCHLORIDE 150 MG: 75 TABLET, FILM COATED ORAL at 21:00

## 2023-03-28 RX ADMIN — SODIUM CHLORIDE, PRESERVATIVE FREE 10 ML: 5 INJECTION INTRAVENOUS at 08:15

## 2023-03-28 RX ADMIN — ALBUTEROL SULFATE 2 PUFF: 90 AEROSOL, METERED RESPIRATORY (INHALATION) at 17:48

## 2023-03-28 RX ADMIN — IPRATROPIUM BROMIDE 2 PUFF: 17 AEROSOL, METERED RESPIRATORY (INHALATION) at 08:38

## 2023-03-28 RX ADMIN — METOCLOPRAMIDE 5 MG: 10 TABLET ORAL at 21:00

## 2023-03-28 RX ADMIN — ALBUTEROL SULFATE 2 PUFF: 90 AEROSOL, METERED RESPIRATORY (INHALATION) at 08:38

## 2023-03-28 RX ADMIN — PROPRANOLOL HYDROCHLORIDE 10 MG: 10 TABLET ORAL at 13:50

## 2023-03-28 RX ADMIN — PROPRANOLOL HYDROCHLORIDE 10 MG: 10 TABLET ORAL at 08:14

## 2023-03-28 RX ADMIN — IPRATROPIUM BROMIDE 2 PUFF: 17 AEROSOL, METERED RESPIRATORY (INHALATION) at 17:48

## 2023-03-28 RX ADMIN — IPRATROPIUM BROMIDE 2 PUFF: 17 AEROSOL, METERED RESPIRATORY (INHALATION) at 12:49

## 2023-03-28 RX ADMIN — LORAZEPAM 1 MG: 1 TABLET ORAL at 15:39

## 2023-03-28 RX ADMIN — ALBUTEROL SULFATE 2 PUFF: 90 AEROSOL, METERED RESPIRATORY (INHALATION) at 12:49

## 2023-03-28 RX ADMIN — ACETAMINOPHEN 650 MG: 325 TABLET ORAL at 15:39

## 2023-03-28 RX ADMIN — PANTOPRAZOLE SODIUM 40 MG: 40 TABLET, DELAYED RELEASE ORAL at 05:55

## 2023-03-28 RX ADMIN — OXYBUTYNIN CHLORIDE 5 MG: 5 TABLET ORAL at 08:14

## 2023-03-28 RX ADMIN — LEVOTHYROXINE SODIUM 100 MCG: 0.1 TABLET ORAL at 05:55

## 2023-03-28 RX ADMIN — PROPRANOLOL HYDROCHLORIDE 10 MG: 10 TABLET ORAL at 21:00

## 2023-03-28 RX ADMIN — LORAZEPAM 1 MG: 1 TABLET ORAL at 08:14

## 2023-03-28 RX ADMIN — ACETAMINOPHEN 650 MG: 325 TABLET ORAL at 08:14

## 2023-03-28 RX ADMIN — ATORVASTATIN CALCIUM 40 MG: 40 TABLET, FILM COATED ORAL at 17:09

## 2023-03-28 RX ADMIN — OXYBUTYNIN CHLORIDE 5 MG: 5 TABLET ORAL at 21:00

## 2023-03-28 RX ADMIN — LORAZEPAM 1 MG: 1 TABLET ORAL at 21:48

## 2023-03-28 RX ADMIN — SODIUM CHLORIDE, PRESERVATIVE FREE 10 ML: 5 INJECTION INTRAVENOUS at 21:02

## 2023-03-28 RX ADMIN — LISINOPRIL 20 MG: 20 TABLET ORAL at 08:14

## 2023-03-28 RX ADMIN — ACETAMINOPHEN 650 MG: 325 TABLET ORAL at 21:48

## 2023-03-28 RX ADMIN — METOCLOPRAMIDE 5 MG: 10 TABLET ORAL at 17:08

## 2023-03-28 ASSESSMENT — PAIN SCALES - WONG BAKER
WONGBAKER_NUMERICALRESPONSE: 0
WONGBAKER_NUMERICALRESPONSE: 0

## 2023-03-28 ASSESSMENT — PAIN SCALES - GENERAL
PAINLEVEL_OUTOF10: 0
PAINLEVEL_OUTOF10: 0
PAINLEVEL_OUTOF10: 3

## 2023-03-28 ASSESSMENT — PAIN DESCRIPTION - DESCRIPTORS: DESCRIPTORS: ACHING

## 2023-03-28 ASSESSMENT — PAIN DESCRIPTION - ORIENTATION: ORIENTATION: MID

## 2023-03-28 ASSESSMENT — PAIN - FUNCTIONAL ASSESSMENT: PAIN_FUNCTIONAL_ASSESSMENT: ACTIVITIES ARE NOT PREVENTED

## 2023-03-28 ASSESSMENT — PAIN DESCRIPTION - LOCATION: LOCATION: HEAD

## 2023-03-28 NOTE — CARE COORDINATION
03/28/23 1149   Readmission Assessment   Number of Days since last admission? 8-30 days   Previous Disposition Home with Family   Who is being Interviewed Patient   What was the patient's/caregiver's perception as to why they think they needed to return back to the hospital?   (Aspirated on peanut.)   Did you visit your Primary Care Physician after you left the hospital, before you returned this time? Yes   Did you see a specialist, such as Cardiac, Pulmonary, Orthopedic Physician, etc. after you left the hospital? No   Who advised the patient to return to the hospital? Self-referral   Does the patient report anything that got in the way of taking their medications? No   In our efforts to provide the best possible care to you and others like you, can you think of anything that we could have done to help you after you left the hospital the first time, so that you might not have needed to return so soon? Other (Comment)  (Monique was ready for discharge.  She is re-admitted under Observation status.)

## 2023-03-28 NOTE — RT PROTOCOL NOTE
RT Inhaler-Nebulizer Bronchodilator Protocol Note    There is a bronchodilator order in the chart from a provider indicating to follow the RT Bronchodilator Protocol and there is an Initiate RT Inhaler-Nebulizer Bronchodilator Protocol order as well (see protocol at bottom of note). CXR Findings:  No results found. The findings from the last RT Protocol Assessment were as follows:   History Pulmonary Disease: Chronic pulmonary disease  Respiratory Pattern: Dyspnea on exertion or RR 21-25 bpm  Breath Sounds: Intermittent or unilateral wheezes  Cough: Strong, spontaneous, non-productive  Indication for Bronchodilator Therapy: Decreased or absent breath sounds, Wheezing associated with pulm disorder  Bronchodilator Assessment Score: 8    Aerosolized bronchodilator medication orders have been revised according to the RT Inhaler-Nebulizer Bronchodilator Protocol below. Respiratory Therapist to perform RT Therapy Protocol Assessment initially then follow the protocol. Repeat RT Therapy Protocol Assessment PRN for score 0-3 or on second treatment, BID, and PRN for scores above 3. No Indications - adjust the frequency to every 6 hours PRN wheezing or bronchospasm, if no treatments needed after 48 hours then discontinue using Per Protocol order mode. If indication present, adjust the RT bronchodilator orders based on the Bronchodilator Assessment Score as indicated below. Use Inhaler orders unless patient has one or more of the following: on home nebulizer, not able to hold breath for 10 seconds, is not alert and oriented, cannot activate and use MDI correctly, or respiratory rate 25 breaths per minute or more, then use the equivalent nebulizer order(s) with same Frequency and PRN reasons based on the score. If a patient is on this medication at home then do not decrease Frequency below that used at home.     0-3 - enter or revise RT bronchodilator order(s) to equivalent RT Bronchodilator order with

## 2023-03-28 NOTE — PLAN OF CARE
Problem: Respiratory - Adult  Goal: Achieves optimal ventilation and oxygenation  3/28/2023 1225 by Karishma Mora RCP  Outcome: Progressing  3/28/2023 1129 by Rosenda Mejia RN  Outcome: Progressing  Flowsheets (Taken 3/28/2023 1129)  Achieves optimal ventilation and oxygenation:   Assess for changes in respiratory status   Assess for changes in mentation and behavior  3/28/2023 0325 by Allan Clements RN  Outcome: Progressing  Flowsheets  Taken 3/28/2023 0325  Achieves optimal ventilation and oxygenation:   Assess for changes in respiratory status   Assess for changes in mentation and behavior  Taken 3/27/2023 2000  Achieves optimal ventilation and oxygenation: Assess for changes in respiratory status   Continue therapy as ordered to achieve and maintain clear breath sounds and improve aeration. Pt agrees with the plan.

## 2023-03-28 NOTE — PLAN OF CARE
Problem: Safety - Adult  Goal: Free from fall injury  3/28/2023 1129 by Elly Ortez RN  Outcome: Progressing  Flowsheets (Taken 3/28/2023 1129)  Free From Fall Injury: Thomas Mendoza family/caregiver on patient safety     Problem: Pain  Goal: Verbalizes/displays adequate comfort level or baseline comfort level  3/28/2023 1129 by Elly Ortez RN  Outcome: Progressing  Flowsheets  Taken 3/28/2023 1129 by Elly Ortez RN  Verbalizes/displays adequate comfort level or baseline comfort level:   Encourage patient to monitor pain and request assistance   Assess pain using appropriate pain scale   Administer analgesics based on type and severity of pain and evaluate response   Implement non-pharmacological measures as appropriate and evaluate response  Taken 3/28/2023 0545 by Sanjeev Santos RN  Verbalizes/displays adequate comfort level or baseline comfort level: Encourage patient to monitor pain and request assistance     Problem: Respiratory - Adult  Goal: Achieves optimal ventilation and oxygenation  3/28/2023 1129 by Elly Ortez RN  Outcome: Progressing  Flowsheets (Taken 3/28/2023 1129)  Achieves optimal ventilation and oxygenation:   Assess for changes in respiratory status   Assess for changes in mentation and behavior     Problem: Cardiovascular - Adult  Goal: Maintains optimal cardiac output and hemodynamic stability  3/28/2023 1129 by Elly Ortez RN  Outcome: Progressing  Flowsheets (Taken 3/28/2023 1129)  Maintains optimal cardiac output and hemodynamic stability: Monitor blood pressure and heart rate     Problem: Cardiovascular - Adult  Goal: Absence of cardiac dysrhythmias or at baseline  Outcome: Progressing  Flowsheets (Taken 3/28/2023 1129)  Absence of cardiac dysrhythmias or at baseline: Monitor cardiac rate and rhythm     Problem: Gastrointestinal - Adult  Goal: Minimal or absence of nausea and vomiting  3/28/2023 1129 by Elly Ortez RN  Outcome: Progressing  Flowsheets (Taken 3/28/2023

## 2023-03-28 NOTE — ED NOTES
Pt to be transported to [facility] in stable condition      Sushila Hooker RN  03/27/23 2004

## 2023-03-28 NOTE — PLAN OF CARE
Problem: Safety - Adult  Goal: Free from fall injury  Outcome: Progressing  Flowsheets (Taken 3/28/2023 0325)  Free From Fall Injury: Instruct family/caregiver on patient safety     Problem: Pain  Goal: Verbalizes/displays adequate comfort level or baseline comfort level  Outcome: Progressing  Flowsheets (Taken 3/28/2023 0325)  Verbalizes/displays adequate comfort level or baseline comfort level:   Encourage patient to monitor pain and request assistance   Assess pain using appropriate pain scale     Problem: Respiratory - Adult  Goal: Achieves optimal ventilation and oxygenation  Outcome: Progressing  Flowsheets  Taken 3/28/2023 0325  Achieves optimal ventilation and oxygenation:   Assess for changes in respiratory status   Assess for changes in mentation and behavior  Taken 3/27/2023 2000  Achieves optimal ventilation and oxygenation: Assess for changes in respiratory status     Problem: Cardiovascular - Adult  Goal: Maintains optimal cardiac output and hemodynamic stability  Outcome: Progressing  Flowsheets (Taken 3/28/2023 0325)  Maintains optimal cardiac output and hemodynamic stability: Monitor blood pressure and heart rate     Problem: Gastrointestinal - Adult  Goal: Minimal or absence of nausea and vomiting  Outcome: Progressing  Flowsheets (Taken 3/28/2023 0325)  Minimal or absence of nausea and vomiting: Advance diet as tolerated, if ordered

## 2023-03-28 NOTE — PROGRESS NOTES
reconstructions, and/or weight-based dosing   when appropriate to reduce radiation to a low as reasonably achievable. XR CHEST (2 VW)   Final Result   Stable radiographic appearance of the chest. No evidence of an acute process. **This report has been created using voice recognition software. It may contain minor errors which are inherent in voice recognition technology. **      Final report electronically signed by Dr. Lamberto Srivastava on 3/27/2023 4:13 PM        XR CHEST (2 VW)    Result Date: 3/27/2023  PROCEDURE: XR CHEST (2 VW) CLINICAL INFORMATION: cough, FB (peanut). COMPARISON: 3/6/2023 TECHNIQUE: PA and lateral views the chest. FINDINGS: The heart size is normal.  The mediastinum is not widened. There are no infiltrates or effusions. There is no focal hyperinflation to suggest air-trapping. There is thickening of the major fissure. There is no radiopaque foreign body identified. The pulmonary vascularity is normal. No suspicious osseous lesions are present. Stable radiographic appearance of the chest. No evidence of an acute process. **This report has been created using voice recognition software. It may contain minor errors which are inherent in voice recognition technology. ** Final report electronically signed by Dr. Lamberto Srivastava on 3/27/2023 4:13 PM    CT CHEST WO CONTRAST    Result Date: 3/28/2023  CT Chest without contrast Indication: Suspected foreign body object. Technique: CT chest without intravenous contrast. Coronal and sagittal reformations. Comparison: CR/SR - XR CHEST STANDARD (2 VW) - 03/27/2023 04:04 PM EDT CT/SR - CTA CHEST W WO CONTRAST - 03/06/2023 09:07 PM EST Findings: Trachea is patent. Right mainstem bronchus is patent. Low attenuating (9 HU) dependent nonocclusive intraluminal filling defect in the distal left mainstem bronchus, series 301, images 28-29. Scattered linear atelectasis. Calcified granuloma left upper lobe. No significant pleural effusion.  No bulky mediastinal, hilar, or axillary lymphadenopathy. The heart is normal in size. No moderate or large pericardial effusion. The thoracic aorta is normal in caliber. Coronary atherosclerotic calcifications. Tiny hiatal hernia. Partially imaged upper abdomen: No upper abdominal ascites. Bones: Spondylosis. Impression: Small water density nonocclusive material in the dependent portion of the distal left mainstem bronchus. Differential diagnosis include secretions, aspirated material. This document has been electronically signed by: Chrystal Valenzuela MD on 03/28/2023 01:09 AM All CTs at this facility use dose modulation techniques and iterative reconstructions, and/or weight-based dosing when appropriate to reduce radiation to a low as reasonably achievable. With RN in room, patient was updated about and agreed upon the treatment plan, all the questions and concerns were addressed.     Electronically signed by Scott Olivas MD on 3/28/2023 at 7:43 AM

## 2023-03-28 NOTE — CONSULTS
nocturnal dyspnea. She is having cough: Yes  Duration of cough: for 21 days. Her cough is associated with sputum production: No   Hemoptysis:No  Diurnal variation: None.      Worse  throughout the day  Relieving factors: No  Aggravating factors: Yes talking      She is having chest pain:No      PMHx   Past Medical History      Diagnosis Date    Anxiety     COPD (chronic obstructive pulmonary disease) (HCC)     GERD (gastroesophageal reflux disease)     Hyperlipidemia     Hypertension     Obstructive pneumonia due to foreign body aspiration 3/27/2023    Psychiatric problem     Seizures ([address]     Thyroid disease       Past Surgical History        Procedure Laterality Date    ABDOMEN SURGERY      csection x 2    COLONOSCOPY      EYE SURGERY  2010 approx    cataract removal    INOCENCIA STEROTACTIC LOC BREAST BIOPSY LEFT Left 2012    neg    INOCENCIA STEROTACTIC LOC BREAST BIOPSY RIGHT Right 2019    neg     Meds    Current Medications    amitriptyline  150 mg Oral Nightly    [Held by provider] aspirin  81 mg Oral Daily    atorvastatin  40 mg Oral QPM    [Held by provider] cetirizine  10 mg Oral Daily    pantoprazole  40 mg Oral QAM AC    oxybutynin  5 mg Oral BID    metoclopramide  5 mg Oral 4x Daily AC & HS    lisinopril  20 mg Oral Daily    levothyroxine  100 mcg Oral Daily    propranolol  10 mg Oral TID    sodium chloride flush  5-40 mL IntraVENous 2 times per day    albuterol sulfate HFA  2 puff Inhalation TID    ipratropium  2 puff Inhalation TID     LORazepam, sodium chloride flush, sodium chloride, ondansetron **OR** ondansetron, polyethylene glycol, acetaminophen **OR** acetaminophen, albuterol sulfate HFA, ipratropium  IV Drips/Infusions   sodium chloride       Home Medications  Medications Prior to Admission: cetirizine (ZYRTEC) 10 MG tablet, Take 1 tablet by mouth daily  desoximetasone (TOPICORT) 0.05 % cream, Apply 1 application topically in the morning and at bedtime  LORazepam (ATIVAN) 1 MG tablet, Take 1 mg by Transportation Needs: Not on file   Physical Activity: Not on file   Stress: Not on file   Social Connections: Not on file   Intimate Partner Violence: Not on file   Housing Stability: Not on file     Family History          Problem Relation Age of Onset    Heart Disease Mother     High Blood Pressure Mother     High Cholesterol Mother     Esophageal Cancer Mother 47        mets to the breast    Kidney Disease Father     Breast Cancer Neg Hx     Ovarian Cancer Neg Hx      Sleep History    Never diagnosed with sleep apnea in the past.  Occupational history   Occupation:  She is current working: No  Type of profession: retired. History of tobacco smoking:Yes  Amount of tobacco smokin.5 PPD. Years of tobacco smokin. Quit smoking: Yes. Quit [id number]   Current smoker: No.       . History of recreational or IV drug use in the past:NO     History of exposure to coal mines/coal dust: NO  History of exposure to foundry dust/welding: NO  History of exposure to quarry/silica/sandblasting: NO  History of exposure to asbestos/working with breaks/ships: NO  History of exposure to farm dust: NO  History of recent travel to long distances: NO  History of exposure to birds, pigeons, or chickens in the past:NO  Pet animals at home:Yes  Dogs: 0  Cats: 10    History of pulmonary embolism in the past: No            History of DVT in the past:No             [place] of systems   CONSTITUTIONAL: Denies fever or weight loss. HEENT: Denies any visual changes or congestion. Denies sore throat. Denies any sinusitis. CARDIOVASCULAR: Denies chest discomfort, palpitations, edema or syncope. RESPIRATORY: Admits to shortness of breath, cough and wheezing  . GASTROINTESTINAL: Denies abdominal pain, nausea, vomiting, diarrhea, constipation. GENITOURINARY: Denies dysuria, urgency, discharge or bleeding currently.   MUSCULOSKELETAL: Denies any muscle or joint

## 2023-03-28 NOTE — CARE COORDINATION
Patient expects to discharge to: House  Plan for transportation at discharge: Family    Financial    Payor: Vishal Dowell / Plan: Tauna Lips / Product Type: *No Product type* /     Does insurance require precert for SNF: Yes    Potential assistance Purchasing Medications: No  Meds-to-Beds request: Yes      [address]  [address] [facility] [address]  Phone: [phone] Fax: [phone]      Notes:    Factors facilitating achievement of predicted outcomes: Family support, Cooperative, Pleasant, and Sense of humor    Barriers to discharge: Medical stability      Additional Case Management Notes: Patient presents due to aspirating on a peanut over a month ago. Patient had bronchoscopy at [facility] with partial peanut removal per chart. She continues to cough with shortness of breath. Pulmonology consulted, Aspirin on hold, home med's resumed, prn Tylenol, Ativan, and Zofran, low sodium diet, telemetry, SCD's, up as tolerated. CT of chest:  Impression:   Small water density nonocclusive material in the dependent portion of the    distal left mainstem bronchus. Differential diagnosis include secretions,    aspirated material.     Procedure: N/A    The Plan for Transition of Care is related to the following treatment goals of Obstructive pneumonia due to foreign body aspiration [J18.9, C46.085O]  Aspiration into airway, subsequent encounter [T17.888D]    Patient Goals/Plan/Treatment Preferences: Met with Monique. She resides at home with her boyfriend. Insurance and PCP verified. She is able to afford her medications and does not have a need for DME. She will have transportation to home and declines HH. She does not feel the need for services at discharge. She is independent and very active. Transportation/Food Security/Housekeeping Addressed: No issues identified.      Dillon Irwin RN  Case Management Department

## 2023-03-29 ENCOUNTER — ANESTHESIA (OUTPATIENT)
Dept: ENDOSCOPY | Age: 68
DRG: 206 | End: 2023-03-29
Payer: MEDICARE

## 2023-03-29 ENCOUNTER — ANESTHESIA EVENT (OUTPATIENT)
Dept: ENDOSCOPY | Age: 68
DRG: 206 | End: 2023-03-29
Payer: MEDICARE

## 2023-03-29 LAB
ACINETOBACTER CALCOACETICUS-BAUMANNII BY PCR: NOT DETECTED
BLACTX-M ISLT/SPM QL: NORMAL
BLAIMP ISLT/SPM QL: NORMAL
BLAKPC ISLT/SPM QL: NORMAL
BLAOXA-48-LIKE ISLT/SPM QL: NORMAL
BLAVIM ISLT/SPM QL: NORMAL
C PNEUM DNA LOWER RESP QL NAA+NON-PROBE: NOT DETECTED
ENTEROBACTER CLOACAE COMPLEX BY PCR: NOT DETECTED
ESCHERICHIA COLI BY PCR: NOT DETECTED
FLUAV RNA LOWER RESP QL NAA+NON-PROBE: NOT DETECTED
FLUBV RNA LOWER RESP QL NAA+NON-PROBE: NOT DETECTED
HADV DNA LOWER RESP QL NAA+NON-PROBE: NOT DETECTED
HAEMOPHILUS INFLUENZAE BY PCR: NOT DETECTED
HCOV RNA LOWER RESP QL NAA+NON-PROBE: NOT DETECTED
HMPV RNA LOWER RESP QL NAA+NON-PROBE: NOT DETECTED
HPIV RNA LOWER RESP QL NAA+NON-PROBE: NOT DETECTED
KLEBSIELLA AEROGENES BY PCR: NOT DETECTED
KLEBSIELLA OXYTOCA BY PCR: NOT DETECTED
KLEBSIELLA PNEUMONIAE GROUP BY PCR: NOT DETECTED
L PNEUMO DNA LOWER RESP QL NAA+NON-PROBE: NOT DETECTED
M PNEUMO DNA LOWER RESP QL NAA+NON-PROBE: NOT DETECTED
MORAXELLA CATARRHALIS BY PCR: NOT DETECTED
PROTEUS SPECIES BY PCR: NOT DETECTED
PSEUDOMONAS AERUGINOSA BY PCR: NOT DETECTED
RESISTANT GENE MECA/C & MREJ BY PCR: NORMAL
RESISTANT GENE NDM BY PCR: NORMAL
RSV RNA LOWER RESP QL NAA+NON-PROBE: NOT DETECTED
RV+EV RNA LOWER RESP QL NAA+NON-PROBE: NOT DETECTED
SERRATIA MARCESCENS BY PCR: NOT DETECTED
SOURCE: NORMAL
SPECIMEN ACCEPTABILITY: NORMAL
STAPH AUREUS BY PCR: NOT DETECTED
STREP AGALACTIAE BY PCR: NOT DETECTED
STREP PNEUMONIAE BY PCR: NOT DETECTED
STREP PYOGENES BY PCR: NOT DETECTED

## 2023-03-29 PROCEDURE — 0B978ZZ DRAINAGE OF LEFT MAIN BRONCHUS, VIA NATURAL OR ARTIFICIAL OPENING ENDOSCOPIC: ICD-10-PCS | Performed by: INTERNAL MEDICINE

## 2023-03-29 PROCEDURE — 99233 SBSQ HOSP IP/OBS HIGH 50: CPT | Performed by: INTERNAL MEDICINE

## 2023-03-29 PROCEDURE — 2500000003 HC RX 250 WO HCPCS: Performed by: NURSE ANESTHETIST, CERTIFIED REGISTERED

## 2023-03-29 PROCEDURE — 6360000002 HC RX W HCPCS: Performed by: INTERNAL MEDICINE

## 2023-03-29 PROCEDURE — 6370000000 HC RX 637 (ALT 250 FOR IP): Performed by: STUDENT IN AN ORGANIZED HEALTH CARE EDUCATION/TRAINING PROGRAM

## 2023-03-29 PROCEDURE — 7100000000 HC PACU RECOVERY - FIRST 15 MIN: Performed by: INTERNAL MEDICINE

## 2023-03-29 PROCEDURE — 87541 LEGION PNEUMO DNA AMP PROB: CPT

## 2023-03-29 PROCEDURE — 94640 AIRWAY INHALATION TREATMENT: CPT

## 2023-03-29 PROCEDURE — 88305 TISSUE EXAM BY PATHOLOGIST: CPT

## 2023-03-29 PROCEDURE — 99232 SBSQ HOSP IP/OBS MODERATE 35: CPT | Performed by: HOSPITALIST

## 2023-03-29 PROCEDURE — 0BC78ZZ EXTIRPATION OF MATTER FROM LEFT MAIN BRONCHUS, VIA NATURAL OR ARTIFICIAL OPENING ENDOSCOPIC: ICD-10-PCS | Performed by: INTERNAL MEDICINE

## 2023-03-29 PROCEDURE — 3700000001 HC ADD 15 MINUTES (ANESTHESIA): Performed by: INTERNAL MEDICINE

## 2023-03-29 PROCEDURE — 31635 BRONCHOSCOPY W/FB REMOVAL: CPT | Performed by: INTERNAL MEDICINE

## 2023-03-29 PROCEDURE — 31645 BRNCHSC W/THER ASPIR 1ST: CPT | Performed by: INTERNAL MEDICINE

## 2023-03-29 PROCEDURE — 1200000000 HC SEMI PRIVATE

## 2023-03-29 PROCEDURE — 0B918ZZ DRAINAGE OF TRACHEA, VIA NATURAL OR ARTIFICIAL OPENING ENDOSCOPIC: ICD-10-PCS | Performed by: INTERNAL MEDICINE

## 2023-03-29 PROCEDURE — 0B938ZZ DRAINAGE OF RIGHT MAIN BRONCHUS, VIA NATURAL OR ARTIFICIAL OPENING ENDOSCOPIC: ICD-10-PCS | Performed by: INTERNAL MEDICINE

## 2023-03-29 PROCEDURE — 2720000010 HC SURG SUPPLY STERILE: Performed by: INTERNAL MEDICINE

## 2023-03-29 PROCEDURE — 7100000001 HC PACU RECOVERY - ADDTL 15 MIN: Performed by: INTERNAL MEDICINE

## 2023-03-29 PROCEDURE — 87798 DETECT AGENT NOS DNA AMP: CPT

## 2023-03-29 PROCEDURE — 87581 M.PNEUMON DNA AMP PROBE: CPT

## 2023-03-29 PROCEDURE — 6360000002 HC RX W HCPCS: Performed by: NURSE ANESTHETIST, CERTIFIED REGISTERED

## 2023-03-29 PROCEDURE — G0378 HOSPITAL OBSERVATION PER HR: HCPCS

## 2023-03-29 PROCEDURE — 88112 CYTOPATH CELL ENHANCE TECH: CPT

## 2023-03-29 PROCEDURE — 87631 RESP VIRUS 3-5 TARGETS: CPT

## 2023-03-29 PROCEDURE — 6370000000 HC RX 637 (ALT 250 FOR IP)

## 2023-03-29 PROCEDURE — 3609027000 HC BRONCHOSCOPY: Performed by: INTERNAL MEDICINE

## 2023-03-29 PROCEDURE — 87486 CHLMYD PNEUM DNA AMP PROBE: CPT

## 2023-03-29 PROCEDURE — 88302 TISSUE EXAM BY PATHOLOGIST: CPT

## 2023-03-29 PROCEDURE — 3700000000 HC ANESTHESIA ATTENDED CARE: Performed by: INTERNAL MEDICINE

## 2023-03-29 PROCEDURE — 87205 SMEAR GRAM STAIN: CPT

## 2023-03-29 PROCEDURE — 2580000003 HC RX 258: Performed by: STUDENT IN AN ORGANIZED HEALTH CARE EDUCATION/TRAINING PROGRAM

## 2023-03-29 PROCEDURE — 2580000003 HC RX 258

## 2023-03-29 PROCEDURE — 87070 CULTURE OTHR SPECIMN AEROBIC: CPT

## 2023-03-29 PROCEDURE — 87106 FUNGI IDENTIFICATION YEAST: CPT

## 2023-03-29 RX ORDER — PROPOFOL 10 MG/ML
INJECTION, EMULSION INTRAVENOUS PRN
Status: DISCONTINUED | OUTPATIENT
Start: 2023-03-29 | End: 2023-03-29 | Stop reason: SDUPTHER

## 2023-03-29 RX ORDER — IPRATROPIUM BROMIDE AND ALBUTEROL SULFATE 2.5; .5 MG/3ML; MG/3ML
SOLUTION RESPIRATORY (INHALATION)
Status: COMPLETED
Start: 2023-03-29 | End: 2023-03-29

## 2023-03-29 RX ORDER — ONDANSETRON 2 MG/ML
INJECTION INTRAMUSCULAR; INTRAVENOUS PRN
Status: DISCONTINUED | OUTPATIENT
Start: 2023-03-29 | End: 2023-03-29 | Stop reason: SDUPTHER

## 2023-03-29 RX ORDER — IPRATROPIUM BROMIDE AND ALBUTEROL SULFATE 2.5; .5 MG/3ML; MG/3ML
1 SOLUTION RESPIRATORY (INHALATION) ONCE
Status: COMPLETED | OUTPATIENT
Start: 2023-03-29 | End: 2023-03-29

## 2023-03-29 RX ORDER — SODIUM CHLORIDE 9 MG/ML
INJECTION, SOLUTION INTRAVENOUS CONTINUOUS PRN
Status: DISCONTINUED | OUTPATIENT
Start: 2023-03-29 | End: 2023-03-29 | Stop reason: SDUPTHER

## 2023-03-29 RX ORDER — SUCCINYLCHOLINE/SOD CL,ISO/PF 200MG/10ML
SYRINGE (ML) INTRAVENOUS PRN
Status: DISCONTINUED | OUTPATIENT
Start: 2023-03-29 | End: 2023-03-29 | Stop reason: SDUPTHER

## 2023-03-29 RX ORDER — DEXAMETHASONE SODIUM PHOSPHATE 4 MG/ML
INJECTION, SOLUTION INTRA-ARTICULAR; INTRALESIONAL; INTRAMUSCULAR; INTRAVENOUS; SOFT TISSUE PRN
Status: DISCONTINUED | OUTPATIENT
Start: 2023-03-29 | End: 2023-03-29 | Stop reason: SDUPTHER

## 2023-03-29 RX ORDER — LIDOCAINE HYDROCHLORIDE 20 MG/ML
INJECTION, SOLUTION EPIDURAL; INFILTRATION; INTRACAUDAL; PERINEURAL PRN
Status: DISCONTINUED | OUTPATIENT
Start: 2023-03-29 | End: 2023-03-29 | Stop reason: SDUPTHER

## 2023-03-29 RX ORDER — EPINEPHRINE 1 MG/ML(1)
AMPUL (ML) INJECTION PRN
Status: DISCONTINUED | OUTPATIENT
Start: 2023-03-29 | End: 2023-03-29 | Stop reason: HOSPADM

## 2023-03-29 RX ADMIN — ALBUTEROL SULFATE 2 PUFF: 90 AEROSOL, METERED RESPIRATORY (INHALATION) at 21:53

## 2023-03-29 RX ADMIN — SODIUM CHLORIDE: 9 INJECTION, SOLUTION INTRAVENOUS at 14:07

## 2023-03-29 RX ADMIN — IPRATROPIUM BROMIDE 2 PUFF: 17 AEROSOL, METERED RESPIRATORY (INHALATION) at 21:53

## 2023-03-29 RX ADMIN — LORAZEPAM 1 MG: 1 TABLET ORAL at 05:45

## 2023-03-29 RX ADMIN — ACETAMINOPHEN 650 MG: 325 TABLET ORAL at 12:01

## 2023-03-29 RX ADMIN — OXYBUTYNIN CHLORIDE 5 MG: 5 TABLET ORAL at 20:59

## 2023-03-29 RX ADMIN — ATORVASTATIN CALCIUM 40 MG: 40 TABLET, FILM COATED ORAL at 17:54

## 2023-03-29 RX ADMIN — DEXAMETHASONE SODIUM PHOSPHATE 10 MG: 4 INJECTION, SOLUTION INTRAMUSCULAR; INTRAVENOUS at 14:46

## 2023-03-29 RX ADMIN — PROPOFOL 50 MG: 10 INJECTION, EMULSION INTRAVENOUS at 14:45

## 2023-03-29 RX ADMIN — PROPRANOLOL HYDROCHLORIDE 10 MG: 10 TABLET ORAL at 10:32

## 2023-03-29 RX ADMIN — PROPOFOL 200 MG: 10 INJECTION, EMULSION INTRAVENOUS at 14:37

## 2023-03-29 RX ADMIN — ONDANSETRON 4 MG: 2 INJECTION INTRAMUSCULAR; INTRAVENOUS at 14:46

## 2023-03-29 RX ADMIN — IPRATROPIUM BROMIDE 2 PUFF: 17 AEROSOL, METERED RESPIRATORY (INHALATION) at 12:24

## 2023-03-29 RX ADMIN — Medication 100 MG: at 14:37

## 2023-03-29 RX ADMIN — LORAZEPAM 1 MG: 1 TABLET ORAL at 17:54

## 2023-03-29 RX ADMIN — PROPRANOLOL HYDROCHLORIDE 10 MG: 10 TABLET ORAL at 20:58

## 2023-03-29 RX ADMIN — IPRATROPIUM BROMIDE 2 PUFF: 17 AEROSOL, METERED RESPIRATORY (INHALATION) at 05:59

## 2023-03-29 RX ADMIN — PROPOFOL 50 MG: 10 INJECTION, EMULSION INTRAVENOUS at 14:51

## 2023-03-29 RX ADMIN — PANTOPRAZOLE SODIUM 40 MG: 40 TABLET, DELAYED RELEASE ORAL at 05:42

## 2023-03-29 RX ADMIN — PROPOFOL 50 MG: 10 INJECTION, EMULSION INTRAVENOUS at 14:47

## 2023-03-29 RX ADMIN — SODIUM CHLORIDE: 9 INJECTION, SOLUTION INTRAVENOUS at 14:30

## 2023-03-29 RX ADMIN — LIDOCAINE HYDROCHLORIDE 40 MG: 20 INJECTION, SOLUTION EPIDURAL; INFILTRATION; INTRACAUDAL; PERINEURAL at 14:37

## 2023-03-29 RX ADMIN — OXYBUTYNIN CHLORIDE 5 MG: 5 TABLET ORAL at 10:32

## 2023-03-29 RX ADMIN — SODIUM CHLORIDE, PRESERVATIVE FREE 10 ML: 5 INJECTION INTRAVENOUS at 07:38

## 2023-03-29 RX ADMIN — SODIUM CHLORIDE, PRESERVATIVE FREE 10 ML: 5 INJECTION INTRAVENOUS at 20:57

## 2023-03-29 RX ADMIN — LEVOTHYROXINE SODIUM 100 MCG: 0.1 TABLET ORAL at 05:41

## 2023-03-29 RX ADMIN — ALBUTEROL SULFATE 2 PUFF: 90 AEROSOL, METERED RESPIRATORY (INHALATION) at 12:23

## 2023-03-29 RX ADMIN — LISINOPRIL 20 MG: 20 TABLET ORAL at 10:30

## 2023-03-29 RX ADMIN — ALBUTEROL SULFATE 2 PUFF: 90 AEROSOL, METERED RESPIRATORY (INHALATION) at 05:59

## 2023-03-29 RX ADMIN — ACETAMINOPHEN 650 MG: 325 TABLET ORAL at 17:54

## 2023-03-29 RX ADMIN — METOCLOPRAMIDE 5 MG: 10 TABLET ORAL at 05:42

## 2023-03-29 RX ADMIN — LORAZEPAM 1 MG: 1 TABLET ORAL at 11:59

## 2023-03-29 RX ADMIN — IPRATROPIUM BROMIDE AND ALBUTEROL SULFATE 1 AMPULE: 2.5; .5 SOLUTION RESPIRATORY (INHALATION) at 15:43

## 2023-03-29 RX ADMIN — METOCLOPRAMIDE 5 MG: 10 TABLET ORAL at 10:33

## 2023-03-29 RX ADMIN — AMITRIPTYLINE HYDROCHLORIDE 150 MG: 75 TABLET, FILM COATED ORAL at 20:58

## 2023-03-29 RX ADMIN — IPRATROPIUM BROMIDE AND ALBUTEROL SULFATE 1 AMPULE: .5; 3 SOLUTION RESPIRATORY (INHALATION) at 15:43

## 2023-03-29 RX ADMIN — ACETAMINOPHEN 650 MG: 325 TABLET ORAL at 05:45

## 2023-03-29 RX ADMIN — METOCLOPRAMIDE 5 MG: 10 TABLET ORAL at 20:59

## 2023-03-29 ASSESSMENT — PAIN - FUNCTIONAL ASSESSMENT: PAIN_FUNCTIONAL_ASSESSMENT: NONE - DENIES PAIN

## 2023-03-29 ASSESSMENT — PAIN SCALES - GENERAL
PAINLEVEL_OUTOF10: 5
PAINLEVEL_OUTOF10: 3
PAINLEVEL_OUTOF10: 0
PAINLEVEL_OUTOF10: 5
PAINLEVEL_OUTOF10: 0
PAINLEVEL_OUTOF10: 0

## 2023-03-29 ASSESSMENT — LIFESTYLE VARIABLES: SMOKING_STATUS: 0

## 2023-03-29 ASSESSMENT — PAIN DESCRIPTION - LOCATION: LOCATION: EAR;HEAD

## 2023-03-29 ASSESSMENT — PAIN DESCRIPTION - ORIENTATION: ORIENTATION: LEFT

## 2023-03-29 ASSESSMENT — PAIN DESCRIPTION - DESCRIPTORS: DESCRIPTORS: DULL

## 2023-03-29 NOTE — CARE COORDINATION
3/29/23, 2:09 PM EDT    DISCHARGE ON GOING EVALUATION    [address] [place] [address] day: 0  Location: -01/001-A Reason for admit: Obstructive pneumonia due to foreign body aspiration [J18.9, T17.908S]  Aspiration into airway, subsequent encounter [T17.908D]   Procedure:   3/29/2023  Bronchoscopy  Barriers to Discharge: Pulmonology following, Proventil and Atrovent inhalers-scheduled and prn, Aspirin on hold, prn Tylenol, Ativan and Zofran, NPO, SCD's, up as tolerated. PCP: Patience Morse  Readmission Risk Score: 18.5%  Patient Goals/Plan/Treatment Preferences: Gill Herrera is from home with her boyfriend. She denies needs at discharge.

## 2023-03-29 NOTE — PLAN OF CARE
Problem: Safety - Adult  Goal: Free from fall injury  3/28/2023 2224 by Sydney Zheng RN  Outcome: Progressing  Flowsheets (Taken 3/28/2023 1129 by Leann Jones RN)  Free From Fall Injury: Instruct family/caregiver on patient safety  3/28/2023 1129 by Leann Jones RN  Outcome: Progressing  Flowsheets (Taken 3/28/2023 1129)  Free From Fall Injury: Instruct family/caregiver on patient safety     Problem: Pain  Goal: Verbalizes/displays adequate comfort level or baseline comfort level  3/28/2023 2224 by Sydney Zheng RN  Outcome: Progressing  Flowsheets (Taken 3/28/2023 1129 by Leann Jones RN)  Verbalizes/displays adequate comfort level or baseline comfort level:   Encourage patient to monitor pain and request assistance   Assess pain using appropriate pain scale   Administer analgesics based on type and severity of pain and evaluate response   Implement non-pharmacological measures as appropriate and evaluate response  3/28/2023 1129 by Leann Jones RN  Outcome: Progressing  Flowsheets  Taken 3/28/2023 1129 by Leann Jones RN  Verbalizes/displays adequate comfort level or baseline comfort level:   Encourage patient to monitor pain and request assistance   Assess pain using appropriate pain scale   Administer analgesics based on type and severity of pain and evaluate response   Implement non-pharmacological measures as appropriate and evaluate response  Taken 3/28/2023 0545 by Deon Ledezma RN  Verbalizes/displays adequate comfort level or baseline comfort level: Encourage patient to monitor pain and request assistance     Problem: Respiratory - Adult  Goal: Achieves optimal ventilation and oxygenation  3/28/2023 2224 by Sydney Zheng RN  Outcome: Progressing  Flowsheets (Taken 3/28/2023 1129 by Leann Jones RN)  Achieves optimal ventilation and oxygenation:   Assess for changes in respiratory status   Assess for changes in mentation and behavior  3/28/2023 1225 by Paradise Jean Karlee Rivera RN  Outcome: Progressing  Flowsheets (Taken 3/28/2023 1129)  Absence of urinary retention: Assess patients ability to void and empty bladder     Problem: Infection - Adult  Goal: Absence of infection at discharge  3/28/2023 2224 by Robe Miller RN  Outcome: Progressing  [address] (Taken 3/28/2023 1129 by Karlee Rivera RN)  Absence of infection at discharge:   Assess and monitor for signs and symptoms of infection   Monitor lab/diagnostic results  3/28/2023 1129 by Karlee Rivera RN  Outcome: Progressing  Flowsheets (Taken 3/28/2023 1129)  Absence of infection at discharge:   Assess and monitor for signs and symptoms of infection   Monitor lab/diagnostic results     Problem: Skin/Tissue Integrity - Adult  Goal: Skin integrity remains intact  3/28/2023 2224 by Robe Miller RN  Outcome: Progressing  Flowsheets (Taken 3/28/2023 2223)  Skin Integrity Remains Intact: Monitor for areas of redness and/or skin breakdown  3/28/2023 1129 by Karlee Rivera RN  Outcome: Progressing  Flowsheets (Taken 3/28/2023 1129)  Skin Integrity Remains Intact: Monitor for areas of redness and/or skin breakdown   Care plan reviewed with patient . Patient verbalizes understanding of the plan of care and contribute to goal setting.

## 2023-03-29 NOTE — PROGRESS NOTES
Patient in endo for bronchoscopy. Scope  used. Pictures taken. Washings completed. Specimens labeled and sent to lab. Foreign body removed and sent to lab for processing. No biopsies taken. 2 jars labeled and sent to lab. Procedure completed. Patient tolerated well.

## 2023-03-29 NOTE — PLAN OF CARE
Problem: Respiratory - Adult  Goal: Achieves optimal ventilation and oxygenation  3/29/2023 0604 by Verito Moon RCP  Outcome: Progressing     Problem: Respiratory - Adult  Goal: Clear lung sounds  Outcome: Progressing   Patient mutually agreed on goals.

## 2023-03-29 NOTE — PROGRESS NOTES
oMnique admitted to endoscopy for a bronchoscopy to remove foreign body. Procedure verified and consent signed.

## 2023-03-29 NOTE — PLAN OF CARE
Problem: Genitourinary - Adult  Goal: Absence of urinary retention  3/29/2023 0845 by Derian Morris RN  Outcome: Progressing  Flowsheets (Taken 3/28/2023 1129 by Juana Dodd RN)  Absence of urinary retention: Assess patients ability to void and empty bladder  3/28/2023 2224 by Geoffrey Quiles RN  Outcome: Progressing  Flowsheets (Taken 3/28/2023 1129 by Juana Dodd RN)  Absence of urinary retention: Assess patients ability to void and empty bladder     Problem: Skin/Tissue Integrity - Adult  Goal: Skin integrity remains intact  3/29/2023 0845 by Derian Morris RN  Outcome: Progressing  Flowsheets (Taken 3/28/2023 2223 by Geoffrey Quiles RN)  Skin Integrity Remains Intact: Monitor for areas of redness and/or skin breakdown  3/28/2023 2224 by Geoffrey Quiles RN  Outcome: Progressing  Flowsheets (Taken 3/28/2023 2223)  Skin Integrity Remains Intact: Monitor for areas of redness and/or skin breakdown     Problem: Safety - Adult  Goal: Free from fall injury  3/29/2023 0845 by Derian Morris RN  Outcome: Progressing  Flowsheets (Taken 3/28/2023 1129 by Juana Dodd RN)  Free From Fall Injury: Instruct family/caregiver on patient safety  3/28/2023 2224 by Geoffrey Quiles RN  Outcome: Kristin Silva (Taken 3/28/2023 1129 by Juana Dodd RN)  Free From Fall Injury: Instruct family/caregiver on patient safety     Problem: Pain  Goal: Verbalizes/displays adequate comfort level or baseline comfort level  3/29/2023 0845 by Derian Morris RN  Outcome: Progressing  Flowsheets (Taken 3/28/2023 1129 by Juana Dodd RN)  Verbalizes/displays adequate comfort level or baseline comfort level:   Encourage patient to monitor pain and request assistance   Assess pain using appropriate pain scale   Administer analgesics based on type and severity of pain and evaluate response   Implement non-pharmacological measures as appropriate and evaluate response  3/28/2023 2224 by Geoffrey Quiles RN  Outcome:

## 2023-03-29 NOTE — PROGRESS NOTES
[facility] THERAPY MISSED TREATMENT NOTE  STR[facility] ONC MED 5K      Date: 3/29/2023  Patient Name: Edison May        MRN: [id number]    : 1955  (74 y.o.)    REASON FOR MISSED TREATMENT: ST received an order from SUSAN Navarrete CNP to complete a Clinical Swallow evaluation. Per order details, patient required to remain NPO on this date for anticipate bronchoscopy. ST to attempt to complete 3/30 as clinically indicated, patient medically appropriateness, and availability. Abhinav Hernandez MA., [id number] [place] [address] / #.[id number]

## 2023-03-29 NOTE — PLAN OF CARE
Problem: Safety - Adult  Goal: Free from fall injury  3/29/2023 0845 by Reid House RN  Outcome: Progressing  Flowsheets (Taken 3/28/2023 1129 by Zina Steve RN)  Free From Fall Injury: Instruct family/caregiver on patient safety  3/28/2023 2224 by Enoch Jones RN  Outcome: Progressing  Flowsheets (Taken 3/28/2023 1129 by Zina Steve RN)  Free From Fall Injury: Instruct family/caregiver on patient safety     Problem: Pain  Goal: Verbalizes/displays adequate comfort level or baseline comfort level  3/29/2023 0845 by Reid House RN  Outcome: Progressing  Flowsheets (Taken 3/28/2023 1129 by Zina Steve RN)  Verbalizes/displays adequate comfort level or baseline comfort level:   Encourage patient to monitor pain and request assistance   Assess pain using appropriate pain scale   Administer analgesics based on type and severity of pain and evaluate response   Implement non-pharmacological measures as appropriate and evaluate response  3/28/2023 2224 by Enoch Jones RN  Outcome: Progressing  Flowsheets (Taken 3/28/2023 1129 by Zina Steve RN)  Verbalizes/displays adequate comfort level or baseline comfort level:   Encourage patient to monitor pain and request assistance   Assess pain using appropriate pain scale   Administer analgesics based on type and severity of pain and evaluate response   Implement non-pharmacological measures as appropriate and evaluate response     Problem: Respiratory - Adult  Goal: Achieves optimal ventilation and oxygenation  3/29/2023 0845 by Reid House RN  Outcome: Progressing  Flowsheets (Taken 3/28/2023 1129 by Zina Steve RN)  Achieves optimal ventilation and oxygenation:   Assess for changes in respiratory status   Assess for changes in mentation and behavior  3/29/2023 0604 by Priscilla Goodman RCP  Outcome: Progressing  3/28/2023 2224 by Enoch Jones RN  Outcome: Progressing  Flowsheets (Taken 3/28/2023 1129 by Zina Steve RN)  Achieves optimal ventilation and oxygenation:   Assess for changes in respiratory status   Assess for changes in mentation and behavior  Goal: Clear lung sounds  3/29/2023 0604 by Bertin Gonzalez RCP  Outcome: Progressing     Problem: Cardiovascular - Adult  Goal: Maintains optimal cardiac output and hemodynamic stability  3/29/2023 0845 by Kole Fuentes RN  Outcome: Progressing  Flowsheets (Taken 3/28/2023 1129 by Gilson Mullen RN)  Maintains optimal cardiac output and hemodynamic stability: Monitor blood pressure and heart rate  3/28/2023 2224 by Zaira Proctor RN  Outcome: Progressing  Flowsheets (Taken 3/28/2023 1129 by Gilson Mullen RN)  Maintains optimal cardiac output and hemodynamic stability: Monitor blood pressure and heart rate  Goal: Absence of cardiac dysrhythmias or at baseline  3/29/2023 0845 by Kole Fuentes RN  Outcome: Progressing  Flowsheets (Taken 3/28/2023 1129 by Gilson Mullen RN)  Absence of cardiac dysrhythmias or at baseline: Monitor cardiac rate and rhythm  3/28/2023 2224 by Zaira Proctor RN  Outcome: Progressing  Flowsheets (Taken 3/28/2023 1129 by Gilson Mullen RN)  Absence of cardiac dysrhythmias or at baseline: Monitor cardiac rate and rhythm     Problem: Gastrointestinal - Adult  Goal: Minimal or absence of nausea and vomiting  3/29/2023 0845 by Kole Fuentes RN  Outcome: Progressing  Flowsheets (Taken 3/28/2023 1129 by Gilson Mullen RN)  Minimal or absence of nausea and vomiting: Advance diet as tolerated, if ordered  3/28/2023 2224 by Zaira Proctor RN  Outcome: Progressing  Flowsheets (Taken 3/28/2023 1129 by Gilson Mullen RN)  Minimal or absence of nausea and vomiting: Advance diet as tolerated, if ordered     Problem: Genitourinary - Adult  Goal: Absence of urinary retention  3/29/2023 0845 by Kole Fuentes RN  Outcome: Progressing  Flowsheets (Taken 3/28/2023 1129 by Gilson Mullen RN)  Absence of urinary retention: Assess patients ability to void and empty

## 2023-03-29 NOTE — PROGRESS NOTES
intraluminal filling defect in the distal left mainstem bronchus, series 301, images 28-29. Scattered linear atelectasis. Calcified granuloma left upper lobe. No significant pleural effusion. No bulky mediastinal, hilar, or axillary lymphadenopathy. The heart is normal in size. No moderate or large pericardial effusion. The thoracic aorta is normal in caliber. Coronary atherosclerotic calcifications. Tiny hiatal hernia. Partially imaged upper abdomen: No upper abdominal ascites. Bones: Spondylosis. Impression: Small water density nonocclusive material in the dependent portion of the distal left mainstem bronchus. Differential diagnosis include secretions, aspirated material. This document has been electronically signed by: Stacy Jara MD on 03/28/2023 01:09 AM All CTs at this facility use dose modulation techniques and iterative reconstructions, and/or weight-based dosing when appropriate to reduce radiation to a low as reasonably achievable. With RN in room, patient was updated about and agreed upon the treatment plan, all the questions and concerns were addressed.     Electronically signed by Debra Pham MD on 3/29/2023 at 8:26 AM

## 2023-03-29 NOTE — ANESTHESIA PRE PROCEDURE
J96.01    Obstructive pneumonia due to foreign body aspiration J18.9, T17.908S    Aspiration into airway T17.908A       Past Medical History:        Diagnosis Date    Anxiety     COPD (chronic obstructive pulmonary disease) (HCC)     GERD (gastroesophageal reflux disease)     Hyperlipidemia     Hypertension     Obstructive pneumonia due to foreign body aspiration 3/27/2023    Psychiatric problem     Seizures ([facility] [address]     Thyroid disease        Past Surgical History:        Procedure Laterality Date    ABDOMEN SURGERY      csection x 2    COLONOSCOPY      EYE SURGERY  2010 approx    cataract removal    INOCENCIA STEROTACTIC LOC BREAST BIOPSY LEFT Left 2012    neg    INOCENCIA STEROTACTIC LOC BREAST BIOPSY RIGHT Right 2019    neg       Social History:    Social History     Tobacco Use    Smoking status: Former     Packs/day: 0.50     Years: 3.00     Pack years: 1.50     Types: Cigarettes    Smokeless tobacco: Never   Substance Use Topics    Alcohol use: Not Currently     Alcohol/week: 2.0 standard drinks     Types: 2 Cans of beer per week     Comment: social drinker                                Counseling given: Not Answered      Vital Signs (Current):   Vitals:    03/29/23 0730 03/29/23 1030 03/29/23 1130 03/29/23 1352   BP: 122/79 122/79 123/65 (!) 146/88   Pulse: 78 78 79 74   Resp: 18 18 18   Temp: 36.4 °C (97.5 °F)  36.7 °C (98.1 °F)    TempSrc: Oral  Oral    SpO2: 99%  96% 96%   Weight:       Height:                                                  BP Readings from Last 3 Encounters:   03/29/23 (!) 146/88   03/09/23 (!) 144/85   02/18/23 (!) 157/99       NPO Status: Time of last liquid consumption: 1300                        Time of last solid consumption: 1800                        Date of last liquid consumption: 03/29/23                        Date of last solid food consumption: 03/28/23    BMI:   Wt Readings from Last 3 Encounters:   03/27/23 138 lb 7.2 oz (62.8 kg)   03/06/23 149 lb 4 oz (67.7 kg)

## 2023-03-29 NOTE — PRE SEDATION
mg Oral Daily Maikol Person DO        atorvastatin (LIPITOR) tablet 40 mg  40 mg Oral QPM Eladio Person, DO   40 mg at 03/28/23 1709    [Held by provider] cetirizine (ZYRTEC) tablet 10 mg  10 mg Oral Daily Eladio Person DO        pantoprazole (PROTONIX) tablet 40 mg  40 mg Oral QAM AC Eladio Person, DO   40 mg at 03/29/23 0542    oxybutynin (DITROPAN) tablet 5 mg  5 mg Oral BID Ephriam Ever Person, DO   5 mg at 03/29/23 1032    LORazepam (ATIVAN) tablet 1 mg  1 mg Oral Q6H PRN Ephrianca Person DO   1 mg at 03/29/23 1159    metoclopramide (REGLAN) tablet 5 mg  5 mg Oral 4x Daily AC & HS Eladio Person, DO   5 mg at 03/29/23 1033    lisinopril (PRINIVIL;ZESTRIL) tablet 20 mg  20 mg Oral Daily Eladio Person, DO   20 mg at 03/29/23 1030    levothyroxine (SYNTHROID) tablet 100 mcg  100 mcg Oral Daily Eladio Person DO   100 mcg at 03/29/23 0541    propranolol (INDERAL) tablet 10 mg  10 mg Oral TID Ephrianca Person, DO   10 mg at 03/29/23 1032    sodium chloride flush 0.9 % injection 5-40 mL  5-40 mL IntraVENous 2 times per day Dejon Begun, DO   10 mL at 03/29/23 0738    sodium chloride flush 0.9 % injection 5-40 mL  5-40 mL IntraVENous PRN Maikol Person DO        0.9 % sodium chloride infusion   IntraVENous PRN Dejon Begun, DO 75 mL/hr at 03/29/23 1407 New Bag at 03/29/23 1407    ondansetron (ZOFRAN-ODT) disintegrating tablet 4 mg  4 mg Oral Q8H PRN Ephrianca Person DO        Or    ondansetron (ZOFRAN) injection 4 mg  4 mg IntraVENous Q6H PRN Maikol Person,         polyethylene glycol (GLYCOLAX) packet 17 g  17 g Oral Daily PRN Ephriam Ever Person DO        acetaminophen (TYLENOL) tablet 650 mg  650 mg Oral Q6H PRN Ephriam Ever Persno DO   650 mg at 03/29/23 1201    Or    acetaminophen (TYLENOL) suppository 650 mg  650 mg Rectal Q6H PRN Eladio Person DO        albuterol sulfate HFA (PROVENTIL;VENTOLIN;PROAIR) 108 (90 Base) MCG/ACT inhaler 2 puff  2 puff Inhalation PM    Electronically signed by Chato Altman MD on 3/29/2023 at 3:23 PM

## 2023-03-29 NOTE — PROGRESS NOTES
1525 pt arrived to PACU, awake and alert. Denies pain. VSS  1535 pt awake in bed, VSS  1543 Duoneb given  1550 pt awake in bed eating ice chips, tolerating well.  VSS  1603 meets criteria for discharge from PACU, [facility]  [facility]

## 2023-03-29 NOTE — OP NOTE
Therapeutic Bronchoscopy procedure note under general anesthesia to remove foreign body from a left mainstem bronchus    Patient: Addis Wallis  : 1955      Operation: Flexible therapeutic fiberoptic bronchoscopy without fluoroscopy. During procedure following procedures were performed: Therapeutic bronchoscope was performed from left tracheobronchial tree. Foreign body ? Peanut removed removed from left mainstem bronchus by using Zero Tip 16mm retrievable basket   Bronch washings obtained from bilateral tracheobronchial tree. Date of Operation: 3/29/2023    Indication for procedure: Abnormal CT scan of chest with a history of foreign body aspiration-therapeutic flexible fiberoptic bronchoscope was done for airway examination and to remove foreign body from left tracheobronchial tree. Pre operative diagnoses:   -Foreign body aspiration, evidenced by CT  -Mild COPD PFT in 2016 Denies history of tobacco smoking   -GERD  -Hx ETOH withdrawal  -Primary hypertension    Post operative diagnoses:   -Foreign body noted in the left mainstem bronchus-removed  -Mild COPD PFT in 2016 Denies history of tobacco smoking   -GERD  -Hx ETOH withdrawal  -Primary hypertension    Surgeon: Dominique Olvera MD    Consent: Addis Wallis is a 74 y.o. female . The risks, benefits, complications, treatment options and expected outcomes were discussed with the patient. Consent obtained from the patient after explaining the risks and complications of the procedure. The possibilities of reaction to medication, pulmonary aspiration, perforation of a viscus, development of pneumothorax with requirement of chest tube placement,air way bleeding, failure to diagnose a condition and creating a complication leading to respiratory failure requiring mechanical ventilation, transfusion or operation were discussed with the patient who freely signed the consent.     The patient was counseled at length about the risks of broken pieces of foreign body. After removing the foreign body from the left mainstem the scope was sequentially passed into the Left main and then Left upper and lower bronchi and segmental bronchi. Therapeutic bronchoscope was performed from left tracheobronchial tree. Foreign body was removed from left mainstem bronchus. Bronchial washings: Bronchial washings were performed form left and right tracheobronchial trees. More than 45 ml of bloodstained bronchial washings were obtained and sent to the laboratory for further analysis. Endobronchial findings:   Trachea: Normal mucosa. Latesha: Normal mucosa  Right main bronchus: Normal mucosa  Right upper lobe bronchus: Normal mucosa  Right Middle lobe bronchus: Normal mucosa  Right Lower lobe bronchus: Inflamed and erythematous endobronchial mucosa. Left main bronchus: Inflamed and erythematous endobronchial mucosa. The mucosa is bleeding on touch. Left upper lobe bronchus: Normal mucosa  Left lower lobe bronchus: Normal mucosa    No endobronchial lesions were noted    The Patient was taken to the endoscopy recovery area in satisfactory condition. Estimated Blood Loss: Less than 5ml. Complications: None. Recommendation/Plan:  1. F/U on culturs results  2. F/U on cytology results    Follow up: Will be followed as inpatient    Attestation: I performed the procedure.     Tori Liu MD    Electronically signed by Tori Liu MD on 3/29/2023 at 3:12 PM

## 2023-03-29 NOTE — ANESTHESIA POSTPROCEDURE EVALUATION
Department of Anesthesiology  Postprocedure Note    Patient: German Spencer  MRN: [id number]  YOB: 1955  Date of evaluation: 3/29/2023      Procedure Summary     Date: 03/29/23 Room / Location: [facility] / [address] [place] [address]    Anesthesia Start: 1430 Anesthesia Stop: 4968    Procedure: BRONCHOSCOPY TO REMOVE FOREIGN BODY Diagnosis:       Aspiration of foreign body, initial encounter      (Aspiration of foreign body, initial encounter [id number])    Surgeons: Blayne Francois MD Responsible Provider: Rubi Mock MD    Anesthesia Type: general ASA Status: 3          Anesthesia Type: No value filed.     Claudine Phase I: Claudine Score: 10    Claudine Phase II:        Anesthesia Post Evaluation    Complications: no

## 2023-03-29 NOTE — PROGRESS NOTES
(TOPICORT) 0.05 % cream, Apply 1 application topically in the morning and at bedtime  LORazepam (ATIVAN) 1 MG tablet, Take 1 mg by mouth every 6 hours as needed for Anxiety. amitriptyline (ELAVIL) 150 MG tablet, Take 150 mg by mouth nightly at bedtime.   metoclopramide (REGLAN) 5 MG tablet, Take 5 mg by mouth 4 times daily as needed for Nausea  oxybutynin (DITROPAN) 5 MG tablet, Take 5 mg by mouth 2 times daily  folic acid (FOLVITE) 1 MG tablet, Take 1 tablet by mouth daily  Multiple Vitamin (MULTIVITAMIN) TABS tablet, Take 1 tablet by mouth daily  aspirin 81 MG chewable tablet, Take 1 tablet by mouth daily  lisinopril (PRINIVIL;ZESTRIL) 20 MG tablet, Take 1 tablet by mouth daily  propranolol (INDERAL) 10 MG tablet, Take 1 tablet by mouth 3 times daily  levothyroxine (SYNTHROID) 100 MCG tablet, Take 1 tablet by mouth Daily  vitamin B-1 (THIAMINE) 100 MG tablet, Take 1 tablet by mouth daily  atorvastatin (LIPITOR) 40 MG tablet, Take 1 tablet by mouth every evening  omeprazole (PRILOSEC) 20 MG capsule, Take 20 mg by mouth 2 times daily as needed   Diet    Diet NPO Exceptions are: Sips of Water with Meds  Allergies    Penicillins  Social History     Social History     Socioeconomic History    Marital status:      Spouse name: Not on file    Number of children: 2    Years of education: Not on file    Highest education level: Not on file   Occupational History    Not on file   Tobacco Use    Smoking status: Former     Packs/day: 0.50     Years: 3.00     Pack years: 1.50     Types: Cigarettes    Smokeless tobacco: Never   Vaping Use    Vaping Use: Never used   Substance and Sexual Activity    Alcohol use: Not Currently     Alcohol/week: 2.0 standard drinks     Types: 2 Cans of beer per week     Comment: social drinker    Drug use: Yes     Types: Benzodiazepines (Downers/Zannies)    Sexual activity: Not Currently   Other Topics Concern    Not on file   Social History Narrative    Not on file     Social Determinants 07:00 PM    TROPONINT < 0.010 11/23/2022 06:27 PM     BNP  No results for input(s): BNP in the last 72 hours. Lactic Acid  Recent Labs     03/27/23  1648   LACTA 1.8       INR  No results for input(s): INR, PROTIME in the last 72 hours. PTT  No results for input(s): APTT in the last 72 hours. Glucose  No results for input(s): POCGLU in the last 72 hours. UA No results for input(s): SPECGRAV, PHUR, COLORU, CLARITYU, MUCUS, PROTEINU, BLOODU, RBCUA, WBCUA, BACTERIA, NITRU, GLUCOSEU, BILIRUBINUR, UROBILINOGEN, KETUA, LABCAST, LABCASTTY, AMORPHOS in the last 72 hours. Invalid input(s): CRYSTALS. PFTs               Sleep studies   None on record    Cultures    MRSA by PCR: Negative    EKG     Echocardiogram       Radiology    CXR  XR CHEST STANDARD TWO VW 03/27/2023  CLINICAL INFORMATION: cough, FB (peanut). FINDINGS:  The heart size is normal.  The mediastinum is not widened. There are no infiltrates or effusions. There is no focal hyperinflation to suggest air-trapping. There is thickening of the major fissure. There is no radiopaque foreign body identified. The  pulmonary vascularity is normal. No suspicious osseous lesions are present. Impression  Stable radiographic appearance of the chest. No evidence of an acute process. CT Scans  (See actual reports for details)  CT Result (most recent):  CT CHEST WO CONTRAST 03/27/2023    Indication: Suspected foreign body object. Findings:  Trachea is patent. Right mainstem bronchus is patent. Low attenuating (9 HU) dependent nonocclusive intraluminal filling defect in the distal left mainstem bronchus, series 301, images 28-29. Scattered linear atelectasis. Calcified granuloma left upper lobe. No  significant pleural effusion. No bulky mediastinal, hilar, or axillary lymphadenopathy. The heart is normal in size. No moderate or large pericardial effusion. The thoracic aorta is normal in caliber. Coronary atherosclerotic  calcifications.   Tiny hiatal

## 2023-03-30 VITALS
DIASTOLIC BLOOD PRESSURE: 85 MMHG | HEIGHT: 65 IN | OXYGEN SATURATION: 98 % | TEMPERATURE: 97.6 F | SYSTOLIC BLOOD PRESSURE: 129 MMHG | WEIGHT: 138.45 LBS | HEART RATE: 93 BPM | RESPIRATION RATE: 18 BRPM | BODY MASS INDEX: 23.07 KG/M2

## 2023-03-30 PROCEDURE — 94640 AIRWAY INHALATION TREATMENT: CPT

## 2023-03-30 PROCEDURE — 99232 SBSQ HOSP IP/OBS MODERATE 35: CPT | Performed by: INTERNAL MEDICINE

## 2023-03-30 PROCEDURE — 99239 HOSP IP/OBS DSCHRG MGMT >30: CPT | Performed by: HOSPITALIST

## 2023-03-30 PROCEDURE — 6370000000 HC RX 637 (ALT 250 FOR IP): Performed by: STUDENT IN AN ORGANIZED HEALTH CARE EDUCATION/TRAINING PROGRAM

## 2023-03-30 RX ORDER — ALBUTEROL SULFATE 90 UG/1
2 AEROSOL, METERED RESPIRATORY (INHALATION) 2 TIMES DAILY
Status: DISCONTINUED | OUTPATIENT
Start: 2023-03-30 | End: 2023-03-30 | Stop reason: HOSPADM

## 2023-03-30 RX ORDER — DOXYCYCLINE HYCLATE 100 MG
100 TABLET ORAL EVERY 12 HOURS SCHEDULED
Status: DISCONTINUED | OUTPATIENT
Start: 2023-03-30 | End: 2023-03-30 | Stop reason: HOSPADM

## 2023-03-30 RX ADMIN — ACETAMINOPHEN 650 MG: 325 TABLET ORAL at 00:24

## 2023-03-30 RX ADMIN — LEVOTHYROXINE SODIUM 100 MCG: 0.1 TABLET ORAL at 06:04

## 2023-03-30 RX ADMIN — LORAZEPAM 1 MG: 1 TABLET ORAL at 00:24

## 2023-03-30 RX ADMIN — LORAZEPAM 1 MG: 1 TABLET ORAL at 07:52

## 2023-03-30 RX ADMIN — LISINOPRIL 20 MG: 20 TABLET ORAL at 07:58

## 2023-03-30 RX ADMIN — IPRATROPIUM BROMIDE 2 PUFF: 17 AEROSOL, METERED RESPIRATORY (INHALATION) at 05:53

## 2023-03-30 RX ADMIN — PROPRANOLOL HYDROCHLORIDE 10 MG: 10 TABLET ORAL at 07:58

## 2023-03-30 RX ADMIN — METOCLOPRAMIDE 5 MG: 10 TABLET ORAL at 06:02

## 2023-03-30 RX ADMIN — ALBUTEROL SULFATE 2 PUFF: 90 AEROSOL, METERED RESPIRATORY (INHALATION) at 05:52

## 2023-03-30 RX ADMIN — PANTOPRAZOLE SODIUM 40 MG: 40 TABLET, DELAYED RELEASE ORAL at 06:01

## 2023-03-30 RX ADMIN — ACETAMINOPHEN 650 MG: 325 TABLET ORAL at 07:52

## 2023-03-30 RX ADMIN — OXYBUTYNIN CHLORIDE 5 MG: 5 TABLET ORAL at 07:58

## 2023-03-30 RX ADMIN — ASPIRIN 81 MG 81 MG: 81 TABLET ORAL at 07:52

## 2023-03-30 ASSESSMENT — PAIN SCALES - GENERAL
PAINLEVEL_OUTOF10: 0
PAINLEVEL_OUTOF10: 4
PAINLEVEL_OUTOF10: 3

## 2023-03-30 ASSESSMENT — PAIN - FUNCTIONAL ASSESSMENT: PAIN_FUNCTIONAL_ASSESSMENT: PREVENTS OR INTERFERES SOME ACTIVE ACTIVITIES AND ADLS

## 2023-03-30 ASSESSMENT — PAIN DESCRIPTION - ORIENTATION: ORIENTATION: LEFT

## 2023-03-30 ASSESSMENT — PAIN DESCRIPTION - DESCRIPTORS: DESCRIPTORS: ACHING;SORE

## 2023-03-30 ASSESSMENT — PAIN DESCRIPTION - LOCATION: LOCATION: NECK

## 2023-03-30 NOTE — PLAN OF CARE
Problem: Safety - Adult  Goal: Free from fall injury  3/30/2023 0737 by Rika Saini RN  Outcome: Progressing  Patient will continue to use call light for assistance to prevent falls and promote patient safety. Problem: Pain  Goal: Verbalizes/displays adequate comfort level or baseline comfort level  3/30/2023 0737 by Rika Saini RN  Outcome: Progressing  Patient denies pain at this time, will continue to assess. Problem: Respiratory - Adult  Goal: Achieves optimal ventilation and oxygenation  3/30/2023 0737 by Rika Saini RN  Outcome: Progressing  Patients lung sounds are clear, no wheezing is present upon assessment.

## 2023-03-30 NOTE — PLAN OF CARE
Problem: Respiratory - Adult  Goal: Achieves optimal ventilation and oxygenation  3/29/2023 2157 by Tristian Santana RCP  Outcome: Progressing     Problem: Respiratory - Adult  Goal: Clear lung sounds  Outcome: Progressing  Patient mutually agrees upon goals.

## 2023-03-30 NOTE — RT PROTOCOL NOTE
RT Inhaler-Nebulizer Bronchodilator Protocol Note    There is a bronchodilator order in the chart from a provider indicating to follow the RT Bronchodilator Protocol and there is an Initiate RT Inhaler-Nebulizer Bronchodilator Protocol order as well (see protocol at bottom of note). CXR Findings:  No results found. The findings from the last RT Protocol Assessment were as follows:   History Pulmonary Disease: Chronic pulmonary disease  Respiratory Pattern: Regular pattern and RR 12-20 bpm  Breath Sounds: Slightly diminished and/or crackles  Cough: Strong, spontaneous, non-productive  Indication for Bronchodilator Therapy: Decreased or absent breath sounds  Bronchodilator Assessment Score: 4    Aerosolized bronchodilator medication orders have been revised according to the RT Inhaler-Nebulizer Bronchodilator Protocol below. Respiratory Therapist to perform RT Therapy Protocol Assessment initially then follow the protocol. Repeat RT Therapy Protocol Assessment PRN for score 0-3 or on second treatment, BID, and PRN for scores above 3. No Indications - adjust the frequency to every 6 hours PRN wheezing or bronchospasm, if no treatments needed after 48 hours then discontinue using Per Protocol order mode. If indication present, adjust the RT bronchodilator orders based on the Bronchodilator Assessment Score as indicated below. Use Inhaler orders unless patient has one or more of the following: on home nebulizer, not able to hold breath for 10 seconds, is not alert and oriented, cannot activate and use MDI correctly, or respiratory rate 25 breaths per minute or more, then use the equivalent nebulizer order(s) with same Frequency and PRN reasons based on the score. If a patient is on this medication at home then do not decrease Frequency below that used at home.     0-3 - enter or revise RT bronchodilator order(s) to equivalent RT Bronchodilator order with Frequency of every 4 hours PRN for wheezing or increased work of breathing using Per Protocol order mode. 4-6 - enter or revise RT Bronchodilator order(s) to two equivalent RT bronchodilator orders with one order with BID Frequency and one order with Frequency of every 4 hours PRN wheezing or increased work of breathing using Per Protocol order mode. 7-10 - enter or revise RT Bronchodilator order(s) to two equivalent RT bronchodilator orders with one order with TID Frequency and one order with Frequency of every 4 hours PRN wheezing or increased work of breathing using Per Protocol order mode. 11-13 - enter or revise RT Bronchodilator order(s) to one equivalent RT bronchodilator order with QID Frequency and an Albuterol order with Frequency of every 4 hours PRN wheezing or increased work of breathing using Per Protocol order mode. Greater than 13 - enter or revise RT Bronchodilator order(s) to one equivalent RT bronchodilator order with every 4 hours Frequency and an Albuterol order with Frequency of every 2 hours PRN wheezing or increased work of breathing using Per Protocol order mode. RT to enter RT Home Evaluation for COPD & MDI Assessment order using Per Protocol order mode.     Electronically signed by Ray Quintanilla RCP on 3/30/2023 at 5:59 AM

## 2023-03-30 NOTE — PLAN OF CARE
Problem: Safety - Adult  Goal: Free from fall injury  3/30/2023 1016 by Jessica Guthrie RN  Outcome: Completed  3/30/2023 0737 by Jessica Guthrie RN  Outcome: Progressing  3/29/2023 2352 by Deniz Lugo RN  Outcome: Progressing

## 2023-03-30 NOTE — RT PROTOCOL NOTE
hours PRN for wheezing or increased work of breathing using Per Protocol order mode. 4-6 - enter or revise RT Bronchodilator order(s) to two equivalent RT bronchodilator orders with one order with BID Frequency and one order with Frequency of every 4 hours PRN wheezing or increased work of breathing using Per Protocol order mode. 7-10 - enter or revise RT Bronchodilator order(s) to two equivalent RT bronchodilator orders with one order with TID Frequency and one order with Frequency of every 4 hours PRN wheezing or increased work of breathing using Per Protocol order mode. 11-13 - enter or revise RT Bronchodilator order(s) to one equivalent RT bronchodilator order with QID Frequency and an Albuterol order with Frequency of every 4 hours PRN wheezing or increased work of breathing using Per Protocol order mode. Greater than 13 - enter or revise RT Bronchodilator order(s) to one equivalent RT bronchodilator order with every 4 hours Frequency and an Albuterol order with Frequency of every 2 hours PRN wheezing or increased work of breathing using Per Protocol order mode. RT to enter RT Home Evaluation for COPD & MDI Assessment order using Per Protocol order mode. Electronically signed by Yovani Clarke RCP on 3/29/2023 at 9:58 PMRT Inhaler-Nebulizer Bronchodilator Protocol Note    There is a bronchodilator order in the chart from a provider indicating to follow the RT Bronchodilator Protocol and there is an Initiate RT Inhaler-Nebulizer Bronchodilator Protocol order as well (see protocol at bottom of note). CXR Findings:  No results found.     The findings from the last RT Protocol Assessment were as follows:   History Pulmonary Disease: Chronic pulmonary disease  Respiratory Pattern: Dyspnea on exertion or RR 21-25 bpm  Breath Sounds: Slightly diminished and/or crackles  Cough: Strong, spontaneous, non-productive  Indication for Bronchodilator Therapy: Decreased or absent breath order with QID Frequency and an Albuterol order with Frequency of every 4 hours PRN wheezing or increased work of breathing using Per Protocol order mode. Greater than 13 - enter or revise RT Bronchodilator order(s) to one equivalent RT bronchodilator order with every 4 hours Frequency and an Albuterol order with Frequency of every 2 hours PRN wheezing or increased work of breathing using Per Protocol order mode. RT to enter RT Home Evaluation for COPD & MDI Assessment order using Per Protocol order mode.     Electronically signed by Tony Fajardo RCP on 3/29/2023 at 9:57 PM

## 2023-03-30 NOTE — PLAN OF CARE
Problem: Safety - Adult  Goal: Free from fall injury  Outcome: Progressing     Problem: Pain  Goal: Verbalizes/displays adequate comfort level or baseline comfort level  Outcome: Progressing     Problem: Respiratory - Adult  Goal: Achieves optimal ventilation and oxygenation  3/29/2023 2352 by Joselo Glasgow RN  Outcome: Progressing     Problem: Infection - Adult  Goal: Absence of infection at discharge  Outcome: Progressing     Problem: Skin/Tissue Integrity - Adult  Goal: Skin integrity remains intact  Outcome: Progressing

## 2023-03-30 NOTE — PLAN OF CARE
Problem: Respiratory - Adult  Goal: Achieves optimal ventilation and oxygenation  3/30/2023 0547 by Rahul Montoya RCP  Outcome: Progressing     Problem: Respiratory - Adult  Goal: Clear lung sounds  3/30/2023 0547 by Rahul Montoya RCP  Outcome: Progressing   Patient mutually agreed on goals.

## 2023-03-30 NOTE — DISCHARGE SUMMARY
143 03/28/2023 04:54 AM    K 4.0 03/28/2023 04:54 AM     03/28/2023 04:54 AM    CO2 28 03/28/2023 04:54 AM    BUN 6 03/28/2023 04:54 AM    CREATININE 0.5 03/28/2023 04:54 AM    CALCIUM 8.9 03/28/2023 04:54 AM    PHOS 3.5 02/17/2023 03:53 AM         Significant Diagnostic Studies    Radiology:   CT CHEST WO CONTRAST   Final Result   Impression:   Small water density nonocclusive material in the dependent portion of the    distal left mainstem bronchus. Differential diagnosis include secretions,    aspirated material.      This document has been electronically signed by: Trae Galvez MD on    03/28/2023 01:09 AM      All CTs at this facility use dose modulation techniques and iterative    reconstructions, and/or weight-based dosing   when appropriate to reduce radiation to a low as reasonably achievable. XR CHEST (2 VW)   Final Result   Stable radiographic appearance of the chest. No evidence of an acute process. **This report has been created using voice recognition software. It may contain minor errors which are inherent in voice recognition technology. **      Final report electronically signed by Dr. Tony Sanford on 3/27/2023 4:13 PM             Consults:     STR ED TO IP CONSULT  IP CONSULT TO PULMONOLOGY    Disposition:    [x] Home       [] TCU       [] Rehab       [] Psych       [] SNF       [] [facility]       [] Other-    Condition at Discharge: Stable    Code Status:  Full Code     Patient Instructions:    Discharge lab work: CBC, BMP in one week  Activity: activity as tolerated  Diet: ADULT DIET; Regular; Low Sodium (2 gm)      Follow-up visits:   No follow-up provider specified.        Discharge Medications:        Medication List        ASK your doctor about these medications      amitriptyline 150 MG tablet  Commonly known as: ELAVIL     aspirin 81 MG chewable tablet  Take 1 tablet by mouth daily     atorvastatin 40 MG tablet  Commonly known as: LIPITOR  Take 1 tablet by mouth every evening     cetirizine 10 MG tablet  Commonly known as: ZYRTEC  Ask about: Which instructions should I use?     desoximetasone 0.05 % cream  Commonly known as: TOPICORT     folic acid 1 MG tablet  Commonly known as: FOLVITE  Take 1 tablet by mouth daily     levothyroxine 100 MCG tablet  Commonly known as: SYNTHROID  Take 1 tablet by mouth Daily     lisinopril 20 MG tablet  Commonly known as: PRINIVIL;ZESTRIL  Take 1 tablet by mouth daily     LORazepam 1 MG tablet  Commonly known as: ATIVAN     metoclopramide 5 MG tablet  Commonly known as: REGLAN     multivitamin Tabs tablet  Take 1 tablet by mouth daily     omeprazole 20 MG delayed release capsule  Commonly known as: PRILOSEC     oxybutynin 5 MG tablet  Commonly known as: DITROPAN     propranolol 10 MG tablet  Commonly known as: INDERAL  Take 1 tablet by mouth 3 times daily     vitamin B-1 100 MG tablet  Commonly known as: THIAMINE  Take 1 tablet by mouth daily              Time Spent on discharge is more than 45 minutes in the examination, evaluation, counseling and review of medications and discharge plan. With RN in room, patient was updated about the treatment plan, all the questions and concerns were addressed. Alarming signs and symptoms to return to ED were explained in length. Signed: Thank you Radha Henry for the opportunity to be involved in this patient's care.     Electronically signed by Victor Manuel Ingram MD on 3/30/2023 at 6:53 AM

## 2023-03-30 NOTE — CARE COORDINATION
3/30/23, 10:32 AM EDT    Patient goals/plan/ treatment preferences discussed by  and . Patient goals/plan/ treatment preferences reviewed with patient/ family. Patient/ family verbalize understanding of discharge plan and are in agreement with goal/plan/treatment preferences. Understanding was demonstrated using the teach back method. AVS provided by RN at time of discharge, which includes all necessary medical information pertaining to the patients current course of illness, treatment, post-discharge goals of care, and treatment preferences. Services At/After Discharge: None       IMM Letter  IMM Letter given to Patient/Family/Significant other/Guardian/POA/by[de-identified] Jennifer Regan RN Case Mgr. IMM Letter date given[de-identified] 03/29/23  IMM Letter time given[de-identified] 5776   .

## 2023-03-30 NOTE — PROGRESS NOTES
Patient discharged to home with daughter. Discharge information and education provided. Information for [facility] given to patient per psychiatry recommendations.

## 2023-03-31 NOTE — PROGRESS NOTES
nocturnal dyspnea. She is having cough: Yes  Duration of cough: for 21 days. Her cough is associated with sputum production: No   Hemoptysis:No  Diurnal variation: None. Worse  throughout the day  Relieving factors: No  Aggravating factors: Yes talking      She is having chest pain:No      Past 24 hrs   -On RA  -Significantly improved shortness of breath  -Denies CP, hemoptysis, or difficulty swallowing   -Afebrile  All other systems reviewed    PMHx   Past Medical History      Diagnosis Date    Anxiety     COPD (chronic obstructive pulmonary disease) (HCC)     GERD (gastroesophageal reflux disease)     Hyperlipidemia     Hypertension     Obstructive pneumonia due to foreign body aspiration 3/27/2023    Psychiatric problem     Seizures ([facility] [address]     Thyroid disease       Past Surgical History        Procedure Laterality Date    ABDOMEN SURGERY      csection x 2    BRONCHOSCOPY N/A 3/29/2023    BRONCHOSCOPY TO REMOVE FOREIGN BODY performed by Alexandrea Jean Baptiste MD at [facility] 29 2010 approx    cataract removal    INOCENCIA STEROTACTIC LOC BREAST BIOPSY LEFT Left 2012    neg    INOCENCIA STEROTACTIC LOC BREAST BIOPSY RIGHT Right 2019    neg     Meds    Current Medications   REM    REM  IV Drips/Infusions  REM    Home Medications  No medications prior to admission.   Diet    No diet orders on file  Allergies    Penicillins  Social History     Social History     Socioeconomic History    Marital status:      Spouse name: Not on file    Number of children: 2    Years of education: Not on file    Highest education level: Not on file   Occupational History    Not on file   Tobacco Use    Smoking status: Former     Packs/day: 0.50     Years: 3.00     Pack years: 1.50     Types: Cigarettes    Smokeless tobacco: Never   Vaping Use    Vaping Use: Never used   Substance and Sexual Activity    Alcohol use: Not Currently     Alcohol/week: 2.0 standard drinks     Types: 2 Cans of beer per ascites. Bones: Spondylosis. Impression:  Small water density nonocclusive material in the dependent portion of the distal left mainstem bronchus. Differential diagnosis include secretions, aspirated material.      Assessment   -Foreign body aspiration, evidenced by CT-patient underwent therapeutic bronchoscopy with removal of foreign body from the left mainstem. She feels much better  -Mild COPD PFT in 2016 Denies history of tobacco smoking   -GERD  -Hx ETOH withdrawal  -Primary hypertension    Plan   -Follow-up pending bronc cultures  -Continue albuterol twice daily as needed,   -ipratropium twice daily as needed  -DVT prophylaxis lovenox  -She is waiting for speech therapy evaluation  -We will start patient on doxycycline 100 mg p.o. twice daily for 7 days for possible aspiration pneumonia due to her allergy to penicillin.  -Follow-up my clinic in 3 months with a chest x-ray PA and lateral views and full PFTs before clinic visit. Chest x-ray and PFTs need to be done 2 days before clinic visit  -Patient educated about my impression and plan    Questions and concerns addressed.     Electronically signed by   Jorge Levine MD on 3/30/2023 at 8:44 PM

## 2023-03-31 NOTE — DISCHARGE INSTRUCTIONS
-Follow-up my clinic in 3 months with a chest x-ray PA and lateral views and full PFTs before clinic visit.   Chest x-ray and PFTs need to be done 2 days before clinic visit

## 2023-04-01 LAB
EKG ATRIAL RATE: 83 BPM
EKG P AXIS: 28 DEGREES
EKG P-R INTERVAL: 196 MS
EKG Q-T INTERVAL: 394 MS
EKG QRS DURATION: 114 MS
EKG QTC CALCULATION (BAZETT): 462 MS
EKG R AXIS: -21 DEGREES
EKG T AXIS: 41 DEGREES
EKG VENTRICULAR RATE: 83 BPM

## 2023-04-02 LAB
BACTERIA SPEC RESP CULT: ABNORMAL
GRAM STN SPEC: ABNORMAL
ORGANISM: ABNORMAL

## 2023-04-03 LAB
BACTERIA SPEC RESP CULT: ABNORMAL
GRAM STN SPEC: ABNORMAL
ORGANISM: ABNORMAL

## 2023-04-04 ENCOUNTER — HOSPITAL ENCOUNTER (OUTPATIENT)
Dept: MAMMOGRAPHY | Age: 68
Discharge: HOME | End: 2023-04-04
Payer: MEDICARE

## 2023-04-04 DIAGNOSIS — Z12.31 VISIT FOR SCREENING MAMMOGRAM: ICD-10-CM

## 2023-04-04 PROCEDURE — 77063 BREAST TOMOSYNTHESIS BI: CPT

## 2023-06-22 ENCOUNTER — HOSPITAL ENCOUNTER (OUTPATIENT)
Dept: PULMONOLOGY | Age: 68
Discharge: HOME | End: 2023-06-22
Payer: MEDICARE

## 2023-06-22 ENCOUNTER — HOSPITAL ENCOUNTER (OUTPATIENT)
Age: 68
Discharge: HOME | End: 2023-06-22
Payer: MEDICARE

## 2023-06-22 ENCOUNTER — HOSPITAL ENCOUNTER (OUTPATIENT)
Dept: GENERAL RADIOLOGY | Age: 68
Discharge: HOME | End: 2023-06-22
Payer: MEDICARE

## 2023-06-22 DIAGNOSIS — J96.01 ACUTE RESPIRATORY FAILURE WITH HYPOXIA (HCC): ICD-10-CM

## 2023-06-22 DIAGNOSIS — J18.9: ICD-10-CM

## 2023-06-22 DIAGNOSIS — T17.908A ASPIRATION INTO AIRWAY, INITIAL ENCOUNTER: ICD-10-CM

## 2023-06-22 DIAGNOSIS — T17.908S: ICD-10-CM

## 2023-06-22 DIAGNOSIS — T17.908D ASPIRATION INTO AIRWAY, SUBSEQUENT ENCOUNTER: ICD-10-CM

## 2023-06-22 PROCEDURE — 94060 EVALUATION OF WHEEZING: CPT

## 2023-06-22 PROCEDURE — 94726 PLETHYSMOGRAPHY LUNG VOLUMES: CPT

## 2023-06-22 PROCEDURE — 71046 X-RAY EXAM CHEST 2 VIEWS: CPT

## 2023-06-22 PROCEDURE — 94729 DIFFUSING CAPACITY: CPT

## 2023-06-27 ENCOUNTER — OFFICE VISIT (OUTPATIENT)
Dept: PULMONOLOGY | Age: 68
End: 2023-06-27
Payer: MEDICARE

## 2023-06-27 VITALS
OXYGEN SATURATION: 96 % | DIASTOLIC BLOOD PRESSURE: 78 MMHG | HEIGHT: 65 IN | SYSTOLIC BLOOD PRESSURE: 130 MMHG | HEART RATE: 87 BPM | WEIGHT: 148.4 LBS | BODY MASS INDEX: 24.72 KG/M2 | TEMPERATURE: 97.3 F

## 2023-06-27 DIAGNOSIS — T17.908A ASPIRATION INTO AIRWAY, INITIAL ENCOUNTER: Primary | ICD-10-CM

## 2023-06-27 DIAGNOSIS — J44.9 CHRONIC OBSTRUCTIVE PULMONARY DISEASE, UNSPECIFIED COPD TYPE (HCC): ICD-10-CM

## 2023-06-27 PROCEDURE — 3078F DIAST BP <80 MM HG: CPT | Performed by: INTERNAL MEDICINE

## 2023-06-27 PROCEDURE — 99214 OFFICE O/P EST MOD 30 MIN: CPT | Performed by: INTERNAL MEDICINE

## 2023-06-27 PROCEDURE — 1123F ACP DISCUSS/DSCN MKR DOCD: CPT | Performed by: INTERNAL MEDICINE

## 2023-06-27 PROCEDURE — 3075F SYST BP GE 130 - 139MM HG: CPT | Performed by: INTERNAL MEDICINE

## 2023-06-27 RX ORDER — FLUTICASONE PROPIONATE 50 MCG
1 SPRAY, SUSPENSION (ML) NASAL DAILY
COMMUNITY

## 2024-05-21 ENCOUNTER — HOSPITAL ENCOUNTER (OUTPATIENT)
Dept: MAMMOGRAPHY | Age: 69
Discharge: HOME | End: 2024-05-21
Payer: MEDICARE

## 2024-05-21 VITALS — WEIGHT: 149 LBS | HEIGHT: 65 IN | BODY MASS INDEX: 24.83 KG/M2

## 2024-05-21 DIAGNOSIS — Z12.31 VISIT FOR SCREENING MAMMOGRAM: ICD-10-CM

## 2024-05-21 PROCEDURE — 77063 BREAST TOMOSYNTHESIS BI: CPT

## 2024-11-05 ENCOUNTER — APPOINTMENT (OUTPATIENT)
Dept: CT IMAGING | Age: 69
DRG: 378 | End: 2024-11-05
Payer: MEDICARE

## 2024-11-05 ENCOUNTER — HOSPITAL ENCOUNTER (INPATIENT)
Age: 69
LOS: 2 days | Discharge: HOME | DRG: 378 | End: 2024-11-07
Attending: STUDENT IN AN ORGANIZED HEALTH CARE EDUCATION/TRAINING PROGRAM
Payer: MEDICARE

## 2024-11-05 DIAGNOSIS — R19.7 DIARRHEA, UNSPECIFIED TYPE: ICD-10-CM

## 2024-11-05 DIAGNOSIS — K92.2 GASTROINTESTINAL HEMORRHAGE, UNSPECIFIED GASTROINTESTINAL HEMORRHAGE TYPE: Primary | ICD-10-CM

## 2024-11-05 DIAGNOSIS — R94.31 PROLONGED Q-T INTERVAL ON ECG: ICD-10-CM

## 2024-11-05 DIAGNOSIS — E87.20 LACTIC ACIDOSIS: ICD-10-CM

## 2024-11-05 DIAGNOSIS — Z79.899 CHRONIC PRESCRIPTION BENZODIAZEPINE USE: ICD-10-CM

## 2024-11-05 PROBLEM — K92.0 HEMATEMESIS: Status: ACTIVE | Noted: 2024-11-05

## 2024-11-05 LAB
ALBUMIN SERPL BCG-MCNC: 4.1 G/DL (ref 3.5–5.1)
ALP SERPL-CCNC: 121 U/L (ref 38–126)
ALT SERPL W/O P-5'-P-CCNC: 25 U/L (ref 11–66)
ANION GAP SERPL CALC-SCNC: 23 MEQ/L (ref 8–16)
APTT PPP: 26.1 SECONDS (ref 22–38)
AST SERPL-CCNC: 13 U/L (ref 5–40)
BASOPHILS ABSOLUTE: 0 THOU/MM3 (ref 0–0.1)
BASOPHILS NFR BLD AUTO: 0.4 %
BILIRUB SERPL-MCNC: 0.4 MG/DL (ref 0.3–1.2)
BUN SERPL-MCNC: 31 MG/DL (ref 7–22)
CA-I BLD ISE-SCNC: 1.13 MMOL/L (ref 1.12–1.32)
CALCIUM SERPL-MCNC: 8.9 MG/DL (ref 8.5–10.5)
CHLORIDE SERPL-SCNC: 100 MEQ/L (ref 98–111)
CO2 SERPL-SCNC: 17 MEQ/L (ref 23–33)
CREAT SERPL-MCNC: 1 MG/DL (ref 0.4–1.2)
DEPRECATED RDW RBC AUTO: 41.8 FL (ref 35–45)
EKG ATRIAL RATE: 111 BPM
EKG P AXIS: 38 DEGREES
EKG P-R INTERVAL: 180 MS
EKG Q-T INTERVAL: 294 MS
EKG Q-T INTERVAL: 464 MS
EKG QRS DURATION: 92 MS
EKG QRS DURATION: 96 MS
EKG QTC CALCULATION (BAZETT): 399 MS
EKG QTC CALCULATION (BAZETT): 650 MS
EKG R AXIS: -19 DEGREES
EKG R AXIS: -26 DEGREES
EKG T AXIS: 67 DEGREES
EKG T AXIS: 94 DEGREES
EKG VENTRICULAR RATE: 111 BPM
EKG VENTRICULAR RATE: 118 BPM
EOSINOPHIL NFR BLD AUTO: 4.8 %
EOSINOPHILS ABSOLUTE: 0.4 THOU/MM3 (ref 0–0.4)
ERYTHROCYTE [DISTWIDTH] IN BLOOD BY AUTOMATED COUNT: 13.4 % (ref 11.5–14.5)
GFR SERPL CREATININE-BSD FRML MDRD: 62 ML/MIN/1.73M2
GLUCOSE BLD STRIP.AUTO-MCNC: 141 MG/DL (ref 70–108)
GLUCOSE BLD STRIP.AUTO-MCNC: 207 MG/DL (ref 70–108)
GLUCOSE SERPL-MCNC: 267 MG/DL (ref 70–108)
HCT VFR BLD AUTO: 44.9 % (ref 37–47)
HEMOCCULT STL QL: POSITIVE
HGB BLD-MCNC: 14.6 GM/DL (ref 12–16)
IMM GRANULOCYTES # BLD AUTO: 0.03 THOU/MM3 (ref 0–0.07)
IMM GRANULOCYTES NFR BLD AUTO: 0.3 %
INR PPP: 1.07 (ref 0.85–1.13)
LACTIC ACID, SEPSIS: 1.9 MMOL/L (ref 0.5–1.9)
LACTIC ACID, SEPSIS: 2.5 MMOL/L (ref 0.5–1.9)
LACTIC ACID, SEPSIS: 3.2 MMOL/L (ref 0.5–1.9)
LACTIC ACID, SEPSIS: 5.2 MMOL/L (ref 0.5–1.9)
LYMPHOCYTES ABSOLUTE: 1.5 THOU/MM3 (ref 1–4.8)
LYMPHOCYTES NFR BLD AUTO: 15.6 %
MAGNESIUM SERPL-MCNC: 1.5 MG/DL (ref 1.6–2.4)
MCH RBC QN AUTO: 27.8 PG (ref 26–33)
MCHC RBC AUTO-ENTMCNC: 32.5 GM/DL (ref 32.2–35.5)
MCV RBC AUTO: 85.4 FL (ref 81–99)
MONOCYTES ABSOLUTE: 1.1 THOU/MM3 (ref 0.4–1.3)
MONOCYTES NFR BLD AUTO: 11.3 %
NEUTROPHILS ABSOLUTE: 6.3 THOU/MM3 (ref 1.8–7.7)
NEUTROPHILS NFR BLD AUTO: 67.6 %
NRBC BLD AUTO-RTO: 0 /100 WBC
OSMOLALITY SERPL CALC.SUM OF ELEC: 295.3 MOSMOL/KG (ref 275–300)
PLATELET # BLD AUTO: 299 THOU/MM3 (ref 130–400)
PMV BLD AUTO: 9.9 FL (ref 9.4–12.4)
POTASSIUM SERPL-SCNC: 3.8 MEQ/L (ref 3.5–5.2)
PROCALCITONIN SERPL IA-MCNC: 0.46 NG/ML (ref 0.01–0.09)
PROT SERPL-MCNC: 7.2 G/DL (ref 6.1–8)
RBC # BLD AUTO: 5.26 MILL/MM3 (ref 4.2–5.4)
SCAN OF BLOOD SMEAR: NORMAL
SODIUM SERPL-SCNC: 140 MEQ/L (ref 135–145)
WBC # BLD AUTO: 9.3 THOU/MM3 (ref 4.8–10.8)

## 2024-11-05 PROCEDURE — 93005 ELECTROCARDIOGRAM TRACING: CPT | Performed by: STUDENT IN AN ORGANIZED HEALTH CARE EDUCATION/TRAINING PROGRAM

## 2024-11-05 PROCEDURE — 83735 ASSAY OF MAGNESIUM: CPT

## 2024-11-05 PROCEDURE — 96366 THER/PROPH/DIAG IV INF ADDON: CPT

## 2024-11-05 PROCEDURE — 99223 1ST HOSP IP/OBS HIGH 75: CPT | Performed by: PHYSICIAN ASSISTANT

## 2024-11-05 PROCEDURE — 93005 ELECTROCARDIOGRAM TRACING: CPT

## 2024-11-05 PROCEDURE — 6360000002 HC RX W HCPCS

## 2024-11-05 PROCEDURE — 2500000003 HC RX 250 WO HCPCS: Performed by: PHYSICIAN ASSISTANT

## 2024-11-05 PROCEDURE — 6370000000 HC RX 637 (ALT 250 FOR IP): Performed by: PHYSICIAN ASSISTANT

## 2024-11-05 PROCEDURE — 82948 REAGENT STRIP/BLOOD GLUCOSE: CPT

## 2024-11-05 PROCEDURE — G0378 HOSPITAL OBSERVATION PER HR: HCPCS

## 2024-11-05 PROCEDURE — 93010 ELECTROCARDIOGRAM REPORT: CPT | Performed by: NUCLEAR MEDICINE

## 2024-11-05 PROCEDURE — 82272 OCCULT BLD FECES 1-3 TESTS: CPT

## 2024-11-05 PROCEDURE — 84145 PROCALCITONIN (PCT): CPT

## 2024-11-05 PROCEDURE — 96375 TX/PRO/DX INJ NEW DRUG ADDON: CPT

## 2024-11-05 PROCEDURE — 99285 EMERGENCY DEPT VISIT HI MDM: CPT

## 2024-11-05 PROCEDURE — 36415 COLL VENOUS BLD VENIPUNCTURE: CPT

## 2024-11-05 PROCEDURE — 96365 THER/PROPH/DIAG IV INF INIT: CPT

## 2024-11-05 PROCEDURE — 1200000003 HC TELEMETRY R&B

## 2024-11-05 PROCEDURE — 85730 THROMBOPLASTIN TIME PARTIAL: CPT

## 2024-11-05 PROCEDURE — 85025 COMPLETE CBC W/AUTO DIFF WBC: CPT

## 2024-11-05 PROCEDURE — 6360000002 HC RX W HCPCS: Performed by: PHYSICIAN ASSISTANT

## 2024-11-05 PROCEDURE — 83605 ASSAY OF LACTIC ACID: CPT

## 2024-11-05 PROCEDURE — 6360000002 HC RX W HCPCS: Performed by: STUDENT IN AN ORGANIZED HEALTH CARE EDUCATION/TRAINING PROGRAM

## 2024-11-05 PROCEDURE — 96367 TX/PROPH/DG ADDL SEQ IV INF: CPT

## 2024-11-05 PROCEDURE — 2580000003 HC RX 258

## 2024-11-05 PROCEDURE — 82330 ASSAY OF CALCIUM: CPT

## 2024-11-05 PROCEDURE — 85610 PROTHROMBIN TIME: CPT

## 2024-11-05 PROCEDURE — 80053 COMPREHEN METABOLIC PANEL: CPT

## 2024-11-05 PROCEDURE — 6360000004 HC RX CONTRAST MEDICATION

## 2024-11-05 PROCEDURE — 96361 HYDRATE IV INFUSION ADD-ON: CPT

## 2024-11-05 PROCEDURE — 74177 CT ABD & PELVIS W/CONTRAST: CPT

## 2024-11-05 PROCEDURE — 96376 TX/PRO/DX INJ SAME DRUG ADON: CPT

## 2024-11-05 PROCEDURE — 2580000003 HC RX 258: Performed by: PHYSICIAN ASSISTANT

## 2024-11-05 RX ORDER — METOCLOPRAMIDE 10 MG/1
5 TABLET ORAL 4 TIMES DAILY PRN
Status: DISCONTINUED | OUTPATIENT
Start: 2024-11-05 | End: 2024-11-07 | Stop reason: HOSPADM

## 2024-11-05 RX ORDER — PROPRANOLOL HYDROCHLORIDE 10 MG/1
10 TABLET ORAL 3 TIMES DAILY
Status: DISCONTINUED | OUTPATIENT
Start: 2024-11-05 | End: 2024-11-07 | Stop reason: HOSPADM

## 2024-11-05 RX ORDER — POTASSIUM CHLORIDE 1500 MG/1
40 TABLET, EXTENDED RELEASE ORAL PRN
Status: DISCONTINUED | OUTPATIENT
Start: 2024-11-05 | End: 2024-11-07 | Stop reason: HOSPADM

## 2024-11-05 RX ORDER — OXYBUTYNIN CHLORIDE 5 MG/1
5 TABLET ORAL 2 TIMES DAILY
Status: DISCONTINUED | OUTPATIENT
Start: 2024-11-05 | End: 2024-11-07 | Stop reason: HOSPADM

## 2024-11-05 RX ORDER — PANTOPRAZOLE SODIUM 40 MG/10ML
80 INJECTION, POWDER, LYOPHILIZED, FOR SOLUTION INTRAVENOUS ONCE
Status: COMPLETED | OUTPATIENT
Start: 2024-11-05 | End: 2024-11-05

## 2024-11-05 RX ORDER — SODIUM CHLORIDE 0.9 % (FLUSH) 0.9 %
5-40 SYRINGE (ML) INJECTION PRN
Status: DISCONTINUED | OUTPATIENT
Start: 2024-11-05 | End: 2024-11-07 | Stop reason: HOSPADM

## 2024-11-05 RX ORDER — SODIUM CHLORIDE 9 MG/ML
25 INJECTION, SOLUTION INTRAVENOUS PRN
Status: DISCONTINUED | OUTPATIENT
Start: 2024-11-05 | End: 2024-11-07 | Stop reason: HOSPADM

## 2024-11-05 RX ORDER — MAGNESIUM SULFATE IN WATER 40 MG/ML
2000 INJECTION, SOLUTION INTRAVENOUS ONCE
Status: COMPLETED | OUTPATIENT
Start: 2024-11-05 | End: 2024-11-05

## 2024-11-05 RX ORDER — LORAZEPAM 1 MG/1
1 TABLET ORAL EVERY 8 HOURS PRN
Status: DISCONTINUED | OUTPATIENT
Start: 2024-11-05 | End: 2024-11-07 | Stop reason: HOSPADM

## 2024-11-05 RX ORDER — POTASSIUM CHLORIDE 7.45 MG/ML
10 INJECTION INTRAVENOUS PRN
Status: DISCONTINUED | OUTPATIENT
Start: 2024-11-05 | End: 2024-11-06 | Stop reason: RX

## 2024-11-05 RX ORDER — LORAZEPAM 2 MG/ML
0.5 INJECTION INTRAMUSCULAR EVERY 4 HOURS PRN
Status: DISCONTINUED | OUTPATIENT
Start: 2024-11-05 | End: 2024-11-05

## 2024-11-05 RX ORDER — 0.9 % SODIUM CHLORIDE 0.9 %
1000 INTRAVENOUS SOLUTION INTRAVENOUS ONCE
Status: COMPLETED | OUTPATIENT
Start: 2024-11-05 | End: 2024-11-05

## 2024-11-05 RX ORDER — LEVOTHYROXINE SODIUM 112 UG/1
112 TABLET ORAL DAILY
Status: DISCONTINUED | OUTPATIENT
Start: 2024-11-06 | End: 2024-11-07 | Stop reason: HOSPADM

## 2024-11-05 RX ORDER — ONDANSETRON 2 MG/ML
4 INJECTION INTRAMUSCULAR; INTRAVENOUS EVERY 6 HOURS PRN
Status: DISCONTINUED | OUTPATIENT
Start: 2024-11-05 | End: 2024-11-07 | Stop reason: HOSPADM

## 2024-11-05 RX ORDER — ACETAMINOPHEN 650 MG/1
650 SUPPOSITORY RECTAL EVERY 6 HOURS PRN
Status: DISCONTINUED | OUTPATIENT
Start: 2024-11-05 | End: 2024-11-07 | Stop reason: HOSPADM

## 2024-11-05 RX ORDER — LISINOPRIL 20 MG/1
20 TABLET ORAL DAILY
Status: DISCONTINUED | OUTPATIENT
Start: 2024-11-05 | End: 2024-11-07 | Stop reason: HOSPADM

## 2024-11-05 RX ORDER — ONDANSETRON 4 MG/1
4 TABLET, ORALLY DISINTEGRATING ORAL EVERY 8 HOURS PRN
Status: DISCONTINUED | OUTPATIENT
Start: 2024-11-05 | End: 2024-11-07 | Stop reason: HOSPADM

## 2024-11-05 RX ORDER — INSULIN LISPRO 100 [IU]/ML
0-4 INJECTION, SOLUTION INTRAVENOUS; SUBCUTANEOUS
Status: DISCONTINUED | OUTPATIENT
Start: 2024-11-05 | End: 2024-11-07 | Stop reason: HOSPADM

## 2024-11-05 RX ORDER — SODIUM CHLORIDE 9 MG/ML
INJECTION, SOLUTION INTRAVENOUS PRN
Status: DISCONTINUED | OUTPATIENT
Start: 2024-11-05 | End: 2024-11-05 | Stop reason: SDUPTHER

## 2024-11-05 RX ORDER — SODIUM CHLORIDE 0.9 % (FLUSH) 0.9 %
5-40 SYRINGE (ML) INJECTION EVERY 12 HOURS SCHEDULED
Status: DISCONTINUED | OUTPATIENT
Start: 2024-11-05 | End: 2024-11-07 | Stop reason: HOSPADM

## 2024-11-05 RX ORDER — ATORVASTATIN CALCIUM 40 MG/1
40 TABLET, FILM COATED ORAL EVERY EVENING
Status: DISCONTINUED | OUTPATIENT
Start: 2024-11-05 | End: 2024-11-07 | Stop reason: HOSPADM

## 2024-11-05 RX ORDER — MAGNESIUM SULFATE IN WATER 40 MG/ML
2000 INJECTION, SOLUTION INTRAVENOUS PRN
Status: DISCONTINUED | OUTPATIENT
Start: 2024-11-05 | End: 2024-11-07 | Stop reason: HOSPADM

## 2024-11-05 RX ORDER — GLUCAGON 1 MG/ML
1 KIT INJECTION PRN
Status: DISCONTINUED | OUTPATIENT
Start: 2024-11-05 | End: 2024-11-07 | Stop reason: HOSPADM

## 2024-11-05 RX ORDER — SODIUM CHLORIDE, SODIUM LACTATE, POTASSIUM CHLORIDE, CALCIUM CHLORIDE 600; 310; 30; 20 MG/100ML; MG/100ML; MG/100ML; MG/100ML
INJECTION, SOLUTION INTRAVENOUS CONTINUOUS
Status: DISCONTINUED | OUTPATIENT
Start: 2024-11-05 | End: 2024-11-05

## 2024-11-05 RX ORDER — LANOLIN ALCOHOL/MO/W.PET/CERES
100 CREAM (GRAM) TOPICAL DAILY
Status: DISCONTINUED | OUTPATIENT
Start: 2024-11-06 | End: 2024-11-07 | Stop reason: HOSPADM

## 2024-11-05 RX ORDER — AMITRIPTYLINE HYDROCHLORIDE 75 MG/1
150 TABLET ORAL NIGHTLY
Status: DISCONTINUED | OUTPATIENT
Start: 2024-11-05 | End: 2024-11-07 | Stop reason: HOSPADM

## 2024-11-05 RX ORDER — IOPAMIDOL 755 MG/ML
80 INJECTION, SOLUTION INTRAVASCULAR
Status: COMPLETED | OUTPATIENT
Start: 2024-11-05 | End: 2024-11-05

## 2024-11-05 RX ORDER — PANTOPRAZOLE SODIUM 40 MG/10ML
80 INJECTION, POWDER, LYOPHILIZED, FOR SOLUTION INTRAVENOUS DAILY
Status: DISCONTINUED | OUTPATIENT
Start: 2024-11-05 | End: 2024-11-05

## 2024-11-05 RX ORDER — POLYETHYLENE GLYCOL 3350 17 G/17G
17 POWDER, FOR SOLUTION ORAL DAILY PRN
Status: DISCONTINUED | OUTPATIENT
Start: 2024-11-05 | End: 2024-11-07 | Stop reason: HOSPADM

## 2024-11-05 RX ORDER — ACETAMINOPHEN 325 MG/1
650 TABLET ORAL EVERY 6 HOURS PRN
Status: DISCONTINUED | OUTPATIENT
Start: 2024-11-05 | End: 2024-11-07 | Stop reason: HOSPADM

## 2024-11-05 RX ORDER — DEXTROSE MONOHYDRATE 100 MG/ML
INJECTION, SOLUTION INTRAVENOUS CONTINUOUS PRN
Status: DISCONTINUED | OUTPATIENT
Start: 2024-11-05 | End: 2024-11-07 | Stop reason: HOSPADM

## 2024-11-05 RX ADMIN — LORAZEPAM 0.5 MG: 2 INJECTION INTRAMUSCULAR; INTRAVENOUS at 13:58

## 2024-11-05 RX ADMIN — IOPAMIDOL 80 ML: 755 INJECTION, SOLUTION INTRAVENOUS at 14:08

## 2024-11-05 RX ADMIN — PROPRANOLOL HYDROCHLORIDE 10 MG: 10 TABLET ORAL at 20:40

## 2024-11-05 RX ADMIN — PROPRANOLOL HYDROCHLORIDE 10 MG: 10 TABLET ORAL at 18:12

## 2024-11-05 RX ADMIN — AMITRIPTYLINE HYDROCHLORIDE 150 MG: 75 TABLET, FILM COATED ORAL at 20:39

## 2024-11-05 RX ADMIN — INSULIN LISPRO 1 UNITS: 100 INJECTION, SOLUTION INTRAVENOUS; SUBCUTANEOUS at 18:26

## 2024-11-05 RX ADMIN — SODIUM CHLORIDE 1000 ML: 9 INJECTION, SOLUTION INTRAVENOUS at 14:00

## 2024-11-05 RX ADMIN — OXYBUTYNIN CHLORIDE 5 MG: 5 TABLET ORAL at 20:40

## 2024-11-05 RX ADMIN — THIAMINE HYDROCHLORIDE: 100 INJECTION, SOLUTION INTRAMUSCULAR; INTRAVENOUS at 18:25

## 2024-11-05 RX ADMIN — PANTOPRAZOLE SODIUM 80 MG: 40 INJECTION, POWDER, FOR SOLUTION INTRAVENOUS at 14:57

## 2024-11-05 RX ADMIN — MAGNESIUM SULFATE HEPTAHYDRATE 2000 MG: 2 INJECTION, SOLUTION INTRAVENOUS at 15:45

## 2024-11-05 RX ADMIN — ATORVASTATIN CALCIUM 40 MG: 40 TABLET, FILM COATED ORAL at 18:15

## 2024-11-05 RX ADMIN — MAGNESIUM SULFATE HEPTAHYDRATE 2000 MG: 40 INJECTION, SOLUTION INTRAVENOUS at 13:37

## 2024-11-05 RX ADMIN — PANTOPRAZOLE SODIUM 80 MG: 40 INJECTION, POWDER, FOR SOLUTION INTRAVENOUS at 18:04

## 2024-11-05 RX ADMIN — PANTOPRAZOLE SODIUM 8 MG/HR: 40 INJECTION, POWDER, FOR SOLUTION INTRAVENOUS at 18:13

## 2024-11-05 ASSESSMENT — PAIN - FUNCTIONAL ASSESSMENT
PAIN_FUNCTIONAL_ASSESSMENT: NONE - DENIES PAIN
PAIN_FUNCTIONAL_ASSESSMENT: NONE - DENIES PAIN

## 2024-11-05 NOTE — CONSENT
Informed Consent for Blood Component Transfusion Note    I have discussed with the patient the rationale for blood component transfusion; its benefits in treating or preventing fatigue, organ damage, or death; and its risk which includes mild transfusion reactions, rare risk of blood borne infection, or more serious but rare reactions. I have discussed the alternatives to transfusion, including the risk and consequences of not receiving transfusion. The patient had an opportunity to ask questions and had agreed to proceed with transfusion of blood components.    Electronically signed by Khoi Soares PA-C on 11/5/24 at 4:07 PM EST

## 2024-11-05 NOTE — H&P
Hospitalist History & Physical    Patient:  Moniqeu Donato    Unit/Bed:5K-20/020-A  YOB: 1955  MRN: [id number]   PCP: Kb Saleh MD  Code Status: Full Code    Date of Service: The patient was seen and examined on 11/05/24 and admitted to Inpatient with an expected length of stay of greater than two midnights due to medical therapy.     Chief Complaint: Vomiting, diarrhea    Assessment/Plan:    Hematemesis, diarrhea, melena  CT of the abdomen and pelvis with fluid within the nondilated small and large bowel loops which could indicate diarrheal illness.  Protonix infusion initiated.  Trend H&H every 8 hours.  Send gastrointestinal molecular panel.  NPO.  Continue IV fluids.  High anion gap metabolic acidosis  Like secondary to lactic acidosis.  Trend lactic acid.  Continue IV fluid resuscitation.  Recheck BMP tomorrow.  Hypomagnesemia  Magnesium 1.5.  Replace and recheck.  Gastroparesis  On Reglan as needed.  Generalized anxiety disorder  Continue home lorazepam as needed  Hypothyroidism  Continue Synthroid  Hypertension  Continue propranolol.  Hold lisinopril due to mild elevation in creatinine.  Hyperlipidemia  On atorvastatin  Chronic alcohol use  Patient drinks approximately 2-4 standard drinks per night.  Remote history of alcohol withdrawal in 2016.  Start thiamine.  Monitor for signs of withdrawal.  Non-insulin-dependent type 2 diabetes mellitus  Not currently on pharmacotherapy.  Hold oral meds. Carb controlled diet. Accuchecks. Low dose sliding scale.  Heart failure with marginally reduced ejection fraction not in acute exacerbation  Echo on 2/17/2023 with EF of 45%  Not currently on diuretic therapy.  Strict ins and outs. Daily weights. Low sodium diet. Fluid restriction.  Referral to cardiology on DC.    History of Present Illness:  Monique Donato is a 69 y.o. female with a history of generalized anxiety disorder, chronic alcohol use, hypothyroidism, hypertension, and  current drug use. Drug: Benzodiazepines (Downers/Zannies).    Family History:       Problem Relation Age of Onset    Heart Disease Mother     High Blood Pressure Mother     High Cholesterol Mother     Esophageal Cancer Mother 54        mets to the breast    Kidney Disease Father     Breast Cancer Neg Hx     Ovarian Cancer Neg Hx        Diet:  Diet NPO Exceptions are: Sips of Water with Meds, Ice Chips    Physical Exam:  /76   Pulse (!) 111   Temp 98.9 °F (37.2 °C) (Oral)   Resp 18   Wt 67.6 kg (149 lb 0.5 oz) Comment: from previous encounter  LMP  (LMP Unknown)   SpO2 97%   BMI 24.80 kg/m²   General: Alert, in no acute distress, cooperative  HEENT:  Normocephalic and atraumatic.  No scleral icterus. External nares without deformity.  External ears normal. Mucous membranes are moist.  Neck: Trachea midline. No jugular venous distension.  Lungs: On room air. Respiratory rate and effort normal.  Lungs clear to auscultation bilaterally.  Cardiac: Regular rhythm, tachycardic.  No murmur, rubs, or gallops.  Abdomen: Non-distended and soft. No tenderness to palpation in all 4 quadrants. No guarding or rigidity.  Bowel sounds normoactive.  Extremities:  No clubbing, cyanosis, or lower extremity edema.  Vasculature: capillary refill < 3 seconds.  Lower extremity pulses 2+ bilaterally  Skin:  Warm and dry on exposed surfaces.  Psychiatric: Mood normal.  Affect appropriate.  Neurologic: Alert and oriented x4.  No cranial nerve deficits.  No extremity weakness.    Data: (All radiographs, tracings, PFTs, and imaging are personally viewed and interpreted unless otherwise noted)    EKG: Sinus tachycardia with a rate of 111 bpm, nonspecific repolarization disturbances    Radiology:  CT ABDOMEN PELVIS W IV CONTRAST Additional Contrast? Radiologist Recommendation    Result Date: 11/5/2024  PROCEDURE: CT ABDOMEN PELVIS W IV CONTRAST CLINICAL INFORMATION: vomiting and diarrhea with melena COMPARISON: Lung bases CT chest

## 2024-11-05 NOTE — ED NOTES
ED to inpatient nurses report      Chief Complaint:  Chief Complaint   Patient presents with    Vomiting    Diarrhea     Present to ED from: home    MOA:     LOC: alert and orientated to name, place, date  Mobility: Independent  Oxygen Baseline: ra    Current needs required: ra     Code Status:   Prior    What abnormal results were found and what did you give/do to treat them? vomiting  Any procedures or intervention occur? meds    Mental Status:  Level of Consciousness: Alert (0)    Psych Assessment:        Vitals:  Patient Vitals for the past 24 hrs:   BP Temp Temp src Pulse Resp SpO2   11/05/24 1456 125/73 -- -- (!) 111 19 97 %   11/05/24 1424 136/76 -- -- (!) 111 17 96 %   11/05/24 1353 (!) 141/70 -- -- (!) 116 17 96 %   11/05/24 1309 139/70 -- -- (!) 118 19 95 %   11/05/24 1241 (!) 164/89 98.1 °F (36.7 °C) Oral (!) 121 18 97 %        LDAs:   Peripheral IV 11/05/24 Right Antecubital (Active)   Site Assessment Clean, dry & intact 11/05/24 1431   Line Status Blood return noted;Flushed 11/05/24 1252       Ambulatory Status:  No data recorded    Diagnosis:  DISPOSITION Admitted 11/05/2024 03:36:45 PM   Final diagnoses:   None        Consults:  None     Pain Score:  Pain Assessment  Pain Assessment: None - Denies Pain    C-SSRS:        Sepsis Screening:       [place] Fall Risk:       Swallow Screening        Preferred Language:   English      ALLERGIES     Penicillins    SURGICAL HISTORY       Past Surgical History:   Procedure Laterality Date    ABDOMEN SURGERY      csection x 2    BRONCHOSCOPY N/A 03/29/2023    BRONCHOSCOPY TO REMOVE FOREIGN BODY performed by Tres Castellanos MD at [facility] Endoscopy    COLONOSCOPY      EYE SURGERY  2010 approx    cataract removal    INOCENCIA STEROTACTIC LOC BREAST BIOPSY LEFT Left 2012    neg    INOCENCIA STEROTACTIC LOC BREAST BIOPSY RIGHT Right 2019    neg       PAST MEDICAL HISTORY       Past Medical History:   Diagnosis Date    Anxiety     COPD (chronic obstructive pulmonary disease)  (HCC)     GERD (gastroesophageal reflux disease)     Hyperlipidemia     Hypertension     Obstructive pneumonia due to foreign body aspiration 3/27/2023    Psychiatric problem     Seizures (HCC)     Thyroid disease            Electronically signed by Sonali Perez RN on 11/5/2024 at 3:59 PM

## 2024-11-05 NOTE — ED NOTES
Pt to ED c/o vomiting and diarrhea since yesterday. Pt states her vomit was \"charcoal\" color. Pt states she has gastroparesis and GERD. Pt denies hx of ulcers or GI bleed. Pt denies any red or dark stools. Pt states she feels dizzy at times. VSS. EKG completed.   Cardiology

## 2024-11-05 NOTE — PROGRESS NOTES
11/05/24 1710   Encounter Summary   Encounter Overview/Reason Initial Encounter;Spiritual/Emotional Needs   Service Provided For Patient;Significant other   Referral/Consult From Rounding   Support System Significant other;Children   Last Encounter  11/05/24   Complexity of Encounter Moderate   Begin Time 1700   End Time  1710   Total Time Calculated 10 min   Spiritual/Emotional needs   Type Spiritual Support   Assessment/Intervention/Outcome   Assessment Coping;Calm   Intervention Active listening;Nurtured Hope;Prayer (assurance of)/[place];Sustaining Presence/Ministry of presence   Outcome Comfort;Coping     Spiritual Health History and Assessment/Progress Note  [facility]    (P) Initial Encounter, Spiritual/Emotional Needs,  ,  ,      Name: Monique Donato MRN: [id number]    Age: 69 y.o.     Sex: female   Language: English   Rastafarian: Temple   Hematemesis     Date: 11/5/2024            Total Time Calculated: (P) 10 min              Spiritual Assessment began in [facility] ONC MED 5K        Referral/Consult From: (P) Rounding   Encounter Overview/Reason: (P) Initial Encounter, Spiritual/Emotional Needs  Service Provided For: (P) Patient, Significant other    Lashawn, Belief, Meaning:   Patient has beliefs or practices that help with coping during difficult times  Family/Friends identify as spiritual      Importance and Influence:  Patient has spiritual/personal beliefs that influence decisions regarding their health  Family/Friends have spiritual/personal beliefs that influence decisions regarding the patient's health    Community:  Patient is connected with a spiritual community  Family/Friends are connected with a spiritual community:    Assessment and Plan of Care:   During my encounter with the 69 yr old patient, I gave the patient a copy of the Advance Directive as requested. I assess the pt's spiritual needs. I also came to assess the patient and their Significant Other with

## 2024-11-05 NOTE — ED PROVIDER NOTES
[facility] EMERGENCY DEPT  EMERGENCY DEPARTMENT ENCOUNTER      Pt Name: Monique Donato  MRN: [id number]  Birthdate 1955  Date of evaluation: 11/5/2024  Physician: Scooter York MD  Supervising Attending Physician: Brennan Chinchilla MD     CHIEF COMPLAINT       Chief Complaint   Patient presents with    Vomiting    Diarrhea     HISTORY OF PRESENT ILLNESS    HPI  Monique Donato is a 69 y.o. female who presents to the emergency department from home, by private vehicle for evaluation of diarrhea and vomiting.  Patient was brought to the ED by her boyfriend.  Boyfriend states that patient has been having shortness of breath and other generalized symptoms for a couple weeks.  Patient states that on the night of 11/4 she started feeling really warm and had bouts of diarrhea about every 45 minutes to 1 hour.  Describes it as charcoal in nature.  Patient also endorsed 1 bout of emesis that was also charcoal in color.  She also felt lightheaded and dizzy so quickly decided bring her in for evaluation.  Patient states she has restarting baby aspirin, and does drink 1-2 beers every other night.  Denies any fevers, chest pain, shortness of breath, nausea, constipation, diarrhea, numbness or tingling  The patient has no other acute complaints at this time.    REVIEW OF SYSTEMS   Review of Systems    PAST MEDICAL AND SURGICAL HISTORY     Past Medical History:   Diagnosis Date    Anxiety     COPD (chronic obstructive pulmonary disease) (HCC)     GERD (gastroesophageal reflux disease)     Hyperlipidemia     Hypertension     Obstructive pneumonia due to foreign body aspiration 3/27/2023    Psychiatric problem     Seizures (HCC)     Thyroid disease      Past Surgical History:   Procedure Laterality Date    ABDOMEN SURGERY      csection x 2    BRONCHOSCOPY N/A 03/29/2023    BRONCHOSCOPY TO REMOVE FOREIGN BODY performed by Tres Castellanos MD at [facility] Endoscopy    COLONOSCOPY      EYE SURGERY  2010  specified.    The results of pertinent diagnostic studies and exam findings were discussed with the patient/surrogate. The patient’s provisional diagnosis and plan of care were discussed with the patient and present family who expressed understanding. Medications were reviewed and indications and risks of medications were discussed with the patient/family present. Strict return precautions and follow up instructions were discussed with the patient prior to discharge, with which the patient agrees.    FINAL DIAGNOSES:  Final diagnoses:   Gastrointestinal hemorrhage, unspecified gastrointestinal hemorrhage type   Prolonged Q-T interval on ECG   Chronic prescription benzodiazepine use   Lactic acidosis   Diarrhea, unspecified type     Condition: condition: stable  Dispo: Admit to med/surg floor  DISPOSITION       This transcription was electronically signed. It was dictated by use of voice recognition software and electronically transcribed. The transcription may contain errors not detected in proofreading.    Electronically signed by Scooter York MD on 11/5/24 at 2:10 PM EST

## 2024-11-05 NOTE — ED NOTES
Pt transported to [facility]K20 by cart in stable condition.   Called [facility] and informed Bryan that the patient was on their way to the unit.

## 2024-11-05 NOTE — PROGRESS NOTES
Patient counted money in the presence of this RN, Westley RAYGOZA and Elina Gamble Supervisor. Amounts verified and security envelope [id number] verified with patient and present staff. [place] police transported money from patient's room to the . Total $9,472

## 2024-11-05 NOTE — CONSULTS
CONSULTATION NOTE :GI       Patient - Monique Donato,  Age - 69 y.o.    - 1955      Room Number - 5K-20/020-A   MRN -  [id number]   [facility] # - [id number]  Date of Admission -  2024 12:35 PM  Patient's PCP: Kb Saleh MD     Requesting Physician: Khoi Soares PA-C    REASON FOR CONSULTATION   Hematemesis dark stools    HISTORY OF PRESENT ILLNESS     Monique Donato is a 69 y.o. female with a history of generalized anxiety disorder, chronic alcohol use, hypothyroidism, hypertension, and hyperlipidemia who presented to [facility] with chief complaint of vomiting and diarrhea.  History is provided by the patient as well as her boyfriend who is present at bedside.  They report that the patient has some chronic diarrhea.  However, over the last few days, her diarrhea has worsened and her stools have become darker.  Her boyfriend reports that she has been having bowel movements about every hour.  The patient does report associated chills with these, but denies any fevers.  She denies eating anything out of the ordinary recently.  This morning, the patient had an episode of projectile vomiting and noted that she had coffee ground appearing emesis.  She did not have any associated abdominal pain with this.  She has not had any further episodes of vomiting.  She denies any history of GI bleed in the past.  She does report she recently started aspirin for cardiac health, but denies any history of coronary artery disease, peripheral artery disease, or cerebrovascular disease.  No history of cirrhosis.  She does currently drink 2-4 standard drinks per day.  Her boyfriend does report a remote history of alcohol withdrawal in 2016, but not more recently.     Last EGD 2022 by Dr. Martin- Esophageal stricture dilated, gastroparesis. Sm bowel bx negative.     At time of examination pt feeling well, no nausea or vomiting at this time. Diarrhea has subsided. Denies any

## 2024-11-06 ENCOUNTER — ANESTHESIA EVENT (OUTPATIENT)
Dept: ENDOSCOPY | Age: 69
DRG: 378 | End: 2024-11-06
Payer: MEDICARE

## 2024-11-06 ENCOUNTER — APPOINTMENT (OUTPATIENT)
Dept: ENDOSCOPY | Age: 69
DRG: 378 | End: 2024-11-06
Attending: INTERNAL MEDICINE
Payer: MEDICARE

## 2024-11-06 ENCOUNTER — ANESTHESIA (OUTPATIENT)
Dept: ENDOSCOPY | Age: 69
DRG: 378 | End: 2024-11-06
Payer: MEDICARE

## 2024-11-06 LAB
ANION GAP SERPL CALC-SCNC: 8 MEQ/L (ref 8–16)
BUN SERPL-MCNC: 15 MG/DL (ref 7–22)
CALCIUM SERPL-MCNC: 8.3 MG/DL (ref 8.5–10.5)
CHLORIDE SERPL-SCNC: 106 MEQ/L (ref 98–111)
CO2 SERPL-SCNC: 24 MEQ/L (ref 23–33)
CREAT SERPL-MCNC: 0.7 MG/DL (ref 0.4–1.2)
GFR SERPL CREATININE-BSD FRML MDRD: > 90 ML/MIN/1.73M2
GLUCOSE BLD STRIP.AUTO-MCNC: 114 MG/DL (ref 70–108)
GLUCOSE BLD STRIP.AUTO-MCNC: 132 MG/DL (ref 70–108)
GLUCOSE BLD STRIP.AUTO-MCNC: 154 MG/DL (ref 70–108)
GLUCOSE BLD STRIP.AUTO-MCNC: 162 MG/DL (ref 70–108)
GLUCOSE SERPL-MCNC: 181 MG/DL (ref 70–108)
MAGNESIUM SERPL-MCNC: 1.9 MG/DL (ref 1.6–2.4)
POTASSIUM SERPL-SCNC: 4.6 MEQ/L (ref 3.5–5.2)
SODIUM SERPL-SCNC: 138 MEQ/L (ref 135–145)

## 2024-11-06 PROCEDURE — 3700000000 HC ANESTHESIA ATTENDED CARE: Performed by: INTERNAL MEDICINE

## 2024-11-06 PROCEDURE — 6360000002 HC RX W HCPCS: Performed by: REGISTERED NURSE

## 2024-11-06 PROCEDURE — 80048 BASIC METABOLIC PNL TOTAL CA: CPT

## 2024-11-06 PROCEDURE — 3609017100 HC EGD: Performed by: INTERNAL MEDICINE

## 2024-11-06 PROCEDURE — 6370000000 HC RX 637 (ALT 250 FOR IP): Performed by: PHYSICIAN ASSISTANT

## 2024-11-06 PROCEDURE — 7100000010 HC PHASE II RECOVERY - FIRST 15 MIN: Performed by: INTERNAL MEDICINE

## 2024-11-06 PROCEDURE — 96366 THER/PROPH/DIAG IV INF ADDON: CPT

## 2024-11-06 PROCEDURE — 2580000003 HC RX 258: Performed by: PHYSICIAN ASSISTANT

## 2024-11-06 PROCEDURE — 82948 REAGENT STRIP/BLOOD GLUCOSE: CPT

## 2024-11-06 PROCEDURE — 0DJ08ZZ INSPECTION OF UPPER INTESTINAL TRACT, VIA NATURAL OR ARTIFICIAL OPENING ENDOSCOPIC: ICD-10-PCS | Performed by: INTERNAL MEDICINE

## 2024-11-06 PROCEDURE — 2580000003 HC RX 258

## 2024-11-06 PROCEDURE — G0378 HOSPITAL OBSERVATION PER HR: HCPCS

## 2024-11-06 PROCEDURE — 99232 SBSQ HOSP IP/OBS MODERATE 35: CPT | Performed by: PHYSICIAN ASSISTANT

## 2024-11-06 PROCEDURE — 83735 ASSAY OF MAGNESIUM: CPT

## 2024-11-06 PROCEDURE — 3700000001 HC ADD 15 MINUTES (ANESTHESIA): Performed by: INTERNAL MEDICINE

## 2024-11-06 PROCEDURE — 36415 COLL VENOUS BLD VENIPUNCTURE: CPT

## 2024-11-06 PROCEDURE — 1200000003 HC TELEMETRY R&B

## 2024-11-06 PROCEDURE — 6360000002 HC RX W HCPCS: Performed by: PHYSICIAN ASSISTANT

## 2024-11-06 RX ORDER — PROPOFOL 10 MG/ML
INJECTION, EMULSION INTRAVENOUS
Status: DISCONTINUED | OUTPATIENT
Start: 2024-11-06 | End: 2024-11-06 | Stop reason: SDUPTHER

## 2024-11-06 RX ADMIN — PROPRANOLOL HYDROCHLORIDE 10 MG: 10 TABLET ORAL at 22:07

## 2024-11-06 RX ADMIN — OXYBUTYNIN CHLORIDE 5 MG: 5 TABLET ORAL at 08:58

## 2024-11-06 RX ADMIN — LEVOTHYROXINE SODIUM 112 MCG: 0.11 TABLET ORAL at 06:13

## 2024-11-06 RX ADMIN — PROPRANOLOL HYDROCHLORIDE 10 MG: 10 TABLET ORAL at 08:58

## 2024-11-06 RX ADMIN — PROPOFOL 50 MG: 10 INJECTION, EMULSION INTRAVENOUS at 16:31

## 2024-11-06 RX ADMIN — PANTOPRAZOLE SODIUM 8 MG/HR: 40 INJECTION, POWDER, FOR SOLUTION INTRAVENOUS at 03:55

## 2024-11-06 RX ADMIN — Medication 100 MG: at 08:58

## 2024-11-06 RX ADMIN — PROPOFOL 50 MG: 10 INJECTION, EMULSION INTRAVENOUS at 16:32

## 2024-11-06 RX ADMIN — SODIUM CHLORIDE, PRESERVATIVE FREE 10 ML: 5 INJECTION INTRAVENOUS at 08:59

## 2024-11-06 RX ADMIN — LORAZEPAM 1 MG: 1 TABLET ORAL at 17:27

## 2024-11-06 RX ADMIN — ATORVASTATIN CALCIUM 40 MG: 40 TABLET, FILM COATED ORAL at 17:20

## 2024-11-06 RX ADMIN — PROPRANOLOL HYDROCHLORIDE 10 MG: 10 TABLET ORAL at 15:23

## 2024-11-06 RX ADMIN — AMITRIPTYLINE HYDROCHLORIDE 150 MG: 75 TABLET, FILM COATED ORAL at 22:07

## 2024-11-06 RX ADMIN — LORAZEPAM 1 MG: 1 TABLET ORAL at 09:04

## 2024-11-06 RX ADMIN — PANTOPRAZOLE SODIUM 8 MG/HR: 40 INJECTION, POWDER, FOR SOLUTION INTRAVENOUS at 09:25

## 2024-11-06 RX ADMIN — PROPOFOL 50 MG: 10 INJECTION, EMULSION INTRAVENOUS at 16:30

## 2024-11-06 RX ADMIN — PROPOFOL 50 MG: 10 INJECTION, EMULSION INTRAVENOUS at 16:33

## 2024-11-06 RX ADMIN — OXYBUTYNIN CHLORIDE 5 MG: 5 TABLET ORAL at 22:07

## 2024-11-06 ASSESSMENT — PAIN - FUNCTIONAL ASSESSMENT
PAIN_FUNCTIONAL_ASSESSMENT: NONE - DENIES PAIN

## 2024-11-06 ASSESSMENT — COPD QUESTIONNAIRES: CAT_SEVERITY: NO INTERVAL CHANGE

## 2024-11-06 NOTE — PLAN OF CARE
Problem: ABCDS Injury Assessment  Goal: Absence of physical injury  11/6/2024 0919 by Carly Graf RN  Outcome: Progressing  11/6/2024 0447 by Tiny Valladares RN  Outcome: Progressing  Flowsheets (Taken 11/5/2024 1917 by Ruth Barrios RN)  Absence of Physical Injury: Implement safety measures based on patient assessment     Problem: Safety - Adult  Goal: Free from fall injury  11/6/2024 0919 by Carly Graf RN  Outcome: Progressing  11/6/2024 0447 by Tiny Valladares RN  Outcome: Progressing  Flowsheets (Taken 11/5/2024 2033)  Free From Fall Injury: Instruct family/caregiver on patient safety     Problem: Pain  Goal: Verbalizes/displays adequate comfort level or baseline comfort level  11/6/2024 0919 by Carly Graf RN  Outcome: Progressing  11/6/2024 0447 by Tiny Valladares RN  Outcome: Progressing  Flowsheets (Taken 11/5/2024 2033)  Verbalizes/displays adequate comfort level or baseline comfort level:   Encourage patient to monitor pain and request assistance   Administer analgesics based on type and severity of pain and evaluate response   Consider cultural and social influences on pain and pain management   Assess pain using appropriate pain scale   Implement non-pharmacological measures as appropriate and evaluate response   Notify Licensed Independent Practitioner if interventions unsuccessful or patient reports new pain     Problem: Gastrointestinal - Adult  Goal: Minimal or absence of nausea and vomiting  11/6/2024 0919 by Carly Graf RN  Outcome: Progressing  Flowsheets (Taken 11/6/2024 0815)  Minimal or absence of nausea and vomiting:   Administer IV fluids as ordered to ensure adequate hydration   Maintain NPO status until nausea and vomiting are resolved  11/6/2024 0447 by Tiny Valladares RN  Outcome: Progressing  Flowsheets (Taken 11/5/2024 2033)  Minimal or absence of nausea and vomiting:   Administer IV fluids as ordered to ensure adequate hydration   Administer ordered antiemetic medications as needed    facility with appropriate resources  11/6/2024 0919 by Carly Graf, RN  Outcome: Progressing  Flowsheets (Taken 11/6/2024 0815)  Discharge to home or other facility with appropriate resources: Identify barriers to discharge with patient and caregiver  11/6/2024 0447 by Tiny Valladares, RN  Outcome: Progressing  Flowsheets (Taken 11/5/2024 2033)  Discharge to home or other facility with appropriate resources:   Identify barriers to discharge with patient and caregiver   Identify discharge learning needs (meds, wound care, etc)   Refer to discharge planning if patient needs post-hospital services based on physician order or complex needs related to functional status, cognitive ability or social support system   Arrange for needed discharge resources and transportation as appropriate

## 2024-11-06 NOTE — ANESTHESIA PRE PROCEDURE
Department of Anesthesiology  Preprocedure Note       Name:  Monique Donato   Age:  69 y.o.  :  1955                                          MRN:  [id number]         Date:  2024      Surgeon: Surgeon(s):  Dmitriy Martin MD    Procedure: Procedure(s):  EGD    Medications prior to admission:   Prior to Admission medications    Medication Sig Start Date End Date Taking? Authorizing Provider   fluticasone (FLONASE) 50 MCG/ACT nasal spray 1 spray by Each Nostril route daily   Yes Anthony Carver MD   cetirizine (ZYRTEC) 10 MG tablet Take 1 tablet by mouth daily   Yes Anthony Carver MD   amitriptyline (ELAVIL) 150 MG tablet Take 1 tablet by mouth nightly at bedtime. 3/22/23  Yes Anthony Carver MD   metoclopramide (REGLAN) 5 MG tablet Take 1 tablet by mouth 4 times daily as needed for Nausea 3/4/23  Yes Anthony Carver MD   oxybutynin (DITROPAN) 5 MG tablet Take 1 tablet by mouth 2 times daily 23  Yes Anthony Carver MD   desoximetasone (TOPICORT) 0.05 % cream Apply 1 application topically in the morning and at bedtime 3/24/23  Yes Anthony Carver MD   LORazepam (ATIVAN) 1 MG tablet Take 1 tablet by mouth every 6 hours as needed for Anxiety.   Yes Anthony Carver MD   aspirin 81 MG chewable tablet Take 1 tablet by mouth daily 23  Yes Lorene Garcia DO   lisinopril (PRINIVIL;ZESTRIL) 20 MG tablet Take 1 tablet by mouth daily 23  Yes Lorene Garcia DO   propranolol (INDERAL) 10 MG tablet Take 1 tablet by mouth 3 times daily 23  Yes Lorene Gacria DO   levothyroxine (SYNTHROID) 100 MCG tablet Take 1 tablet by mouth Daily 23  Yes Lorene Garcia DO   atorvastatin (LIPITOR) 40 MG tablet Take 1 tablet by mouth every evening 16  Yes Lorene Owens MD   omeprazole (PRILOSEC) 20 MG capsule Take 1 capsule by mouth 2 times daily as needed   Yes Anthony Carver MD   Multiple Vitamin (MULTIVITAMIN) TABS tablet Take 1 tablet by mouth daily 3/9/23

## 2024-11-06 NOTE — PROGRESS NOTES
Recovery mode, pt denies discomfort.   discussed findings with pt and responsible party. Discharge instructions provided and understanding verbalized.      Report called to 5k nurse heath.

## 2024-11-06 NOTE — ANESTHESIA POSTPROCEDURE EVALUATION
Department of Anesthesiology  Postprocedure Note    Patient: Monique Donato  MRN: [id number]  YOB: 1955  Date of evaluation: 11/6/2024    Procedure Summary       Date: 11/06/24 Room / Location: [place] [id number] / [facility]    Anesthesia Start: 1629 Anesthesia Stop: 1636    Procedure: EGD Diagnosis:       Gastrointestinal hemorrhage, unspecified gastrointestinal hemorrhage type      (Gastrointestinal hemorrhage, unspecified gastrointestinal hemorrhage type [K92.2])    Surgeons: Dmitriy Martin MD Responsible Provider: Michael Diaz MD    Anesthesia Type: MAC ASA Status: 3            Anesthesia Type: No value filed.    Claudine Phase I: Claudine Score: 10    Claudine Phase II:      Anesthesia Post Evaluation    Patient location during evaluation: PACU  Patient participation: complete - patient participated  Level of consciousness: awake  Pain score: 0  Airway patency: patent  Nausea & Vomiting: no vomiting and no nausea  Cardiovascular status: hemodynamically stable  Respiratory status: spontaneous ventilation and room air  Hydration status: stable        No notable events documented.

## 2024-11-06 NOTE — PROGRESS NOTES
admitted to Endo department and admitted to Endo room 13  Plan of care reviewed with patient.   Call light within reach.   Allergies reviewed with  pt   Bed in lowest position, locked, with one bed rail up.   Appropriate arm bands on patient.   Bathroom offered.   All questions and concerns of patient addressed

## 2024-11-06 NOTE — PROGRESS NOTES
Spiritual Health History and Assessment/Progress Note  [facility]    (P)  Encounter,  ,  ,      Name: Monique Donato MRN: [id number]    Age: 69 y.o.     Sex: female   Language: English   Lutheran: Rastafari   Hematemesis     Date: 11/6/2024            Total Time Calculated: (P) 6 min              Spiritual Assessment continued in [facility] ONC MED 5K        Referral/Consult From: (P) Rounding   Encounter Overview/Reason: (P)  Encounter  Service Provided For: (P) Patient    Lashawn, Belief, Meaning:   Patient identifies as spiritual  Family/Friends No family/friends present      Importance and Influence:  Patient has spiritual/personal beliefs that influence decisions regarding their health  Family/Friends No family/friends present    Community:  Patient is connected with a spiritual community  Family/Friends No family/friends present    Assessment and Plan of Care:     Patient Interventions include: Facilitated expression of thoughts and feelings  Family/Friends Interventions include: No family/friends present    Patient Plan of Care: Spiritual Care available upon further referral  Family/Friends Plan of Care: No family/friends present    Electronically signed by ALLIE Deluna on 11/6/2024 at 2:06 PM

## 2024-11-06 NOTE — BRIEF OP NOTE
Brief Postoperative Note      Patient: Monique Donato  YOB: 1955  MRN: [id number]    Date of Procedure: 11/6/2024    Pre-Op Diagnosis Codes:      * Gastrointestinal hemorrhage, unspecified gastrointestinal hemorrhage type [K92.2]    Post-Op Diagnosis:  normal egd       Procedure(s):  EGD    Surgeon(s):  Dmitriy Martin MD    Assistant:  * No surgical staff found *    Anesthesia: Monitor Anesthesia Care    Estimated Blood Loss (mL): Minimal    Complications: None    Specimens:   * No specimens in log *    Implants:  * No implants in log *      Drains: * No LDAs found *    Findings:  Infection Present At Time Of Surgery (PATOS) (choose all levels that have infection present):  No infection present  Other Findings: normal egd    Electronically signed by Dmitriy Martin MD on 11/6/2024 at 4:46 PM

## 2024-11-06 NOTE — CARE COORDINATION
Case Management Assessment Initial Evaluation    Date/Time of Evaluation: 2024 3:43 PM  Assessment Completed by: Ora Neal, RN    If patient is discharged prior to next notation, then this note serves as note for discharge by case management.    Patient Name: Monique Donato                   YOB: 1955  Diagnosis: Hematemesis [K92.0]                   Date / Time: 2024 12:35 PM  Location: [address] [place] [address]     Patient Admission Status: Inpatient   Readmission Risk Low 0-14, Mod 15-19), High > 20: Readmission Risk Score: 8    Current PCP: Kb Saleh MD  Health Care Decision Makers:   Primary Decision Maker: Shaji Hong - Domestic Partner - [phone]    Additional Case Management Notes: Patient presented to ED with vomiting and diarrhea. Does have some chronic diarrhea that has worsened over the last few days, resulting in greater than 1 BM/hr. Admitted by hospitalist w consult to GI. Protonix gtt. H&H Q8. NPO for EGD. IVF. Stool sample ordered.     Procedures:    EGD planned for 1630.       Imagin/5 CT A/P:  1. No bowel obstruction, fluid collection, or free air is visualized. The appendix is normal. Fluid is seen within the nondilated small and large bowel loops which could indicate diarrheal illness. Follow-up to ensure resolution is advised.  2. The urinary bladder is partially distended. Bladder wall thickening may be artifactual related to under distention. Correlate with urinalysis. No renal calculus or obstructive uropathy is visualized.  3. Calcified fibroid uterus. Chronic findings are discussed.    Patient Goals/Plan/Treatment Preferences: Monique plans to return home with her significant other. Has blank ACP docs at the bedside to fill out. Denies need for DME and services.        24 1500   Service Assessment   Patient Orientation Alert and Oriented   Cognition Alert   History Provided By Patient   Primary Caregiver Self   Accompanied By/Relationship no  one   Support Systems Spouse/Significant Other;Family Members   Patient's Healthcare Decision Maker is: Legal Next of Kin  (has blank acp docs at bedside to fill out)   PCP Verified by CM Yes   Last Visit to PCP Within last 3 months   Prior Functional Level Independent in ADLs/IADLs   Current Functional Level Independent in ADLs/IADLs   Can patient return to prior living arrangement Yes   Ability to make needs known: Good   Family able to assist with home care needs: Yes   Would you like for me to discuss the discharge plan with any other family members/significant others, and if so, who? No   Financial Resources Medicare;Medicaid   Community Resources None   Social/Functional History   Active  Yes   Discharge Planning   Type of Residence House   Living Arrangements Spouse/Significant Other  (significant other, Shaji)   Current Services Prior To Admission None   Potential Assistance Needed N/A   DME Ordered? No   Potential Assistance Purchasing Medications No   Type of Home Care Services None   Patient expects to be discharged to: House   Services At/After Discharge   Mode of Transport at Discharge Other (see comment)  (family)   Confirm Follow Up Transport Self   Condition of Participation: Discharge Planning   The Plan for Transition of Care is related to the following treatment goals: EGD today

## 2024-11-06 NOTE — PLAN OF CARE
Problem: ABCDS Injury Assessment  Goal: Absence of physical injury  Outcome: Progressing  Flowsheets (Taken 11/5/2024 1917 by Ruth Barrios RN)  Absence of Physical Injury: Implement safety measures based on patient assessment     Problem: Safety - Adult  Goal: Free from fall injury  Outcome: Progressing  Flowsheets (Taken 11/5/2024 2033)  Free From Fall Injury: Instruct family/caregiver on patient safety     Problem: Pain  Goal: Verbalizes/displays adequate comfort level or baseline comfort level  Outcome: Progressing  Flowsheets (Taken 11/5/2024 2033)  Verbalizes/displays adequate comfort level or baseline comfort level:   Encourage patient to monitor pain and request assistance   Administer analgesics based on type and severity of pain and evaluate response   Consider cultural and social influences on pain and pain management   Assess pain using appropriate pain scale   Implement non-pharmacological measures as appropriate and evaluate response   Notify Licensed Independent Practitioner if interventions unsuccessful or patient reports new pain     Problem: Gastrointestinal - Adult  Goal: Minimal or absence of nausea and vomiting  Outcome: Progressing  Flowsheets (Taken 11/5/2024 2033)  Minimal or absence of nausea and vomiting:   Administer IV fluids as ordered to ensure adequate hydration   Administer ordered antiemetic medications as needed   Provide nonpharmacologic comfort measures as appropriate  Goal: Maintains or returns to baseline bowel function  Outcome: Progressing  Flowsheets (Taken 11/5/2024 2033)  Maintains or returns to baseline bowel function:   Assess bowel function   Administer IV fluids as ordered to ensure adequate hydration   Encourage mobilization and activity   Encourage oral fluids to ensure adequate hydration   Administer ordered medications as needed   Nutrition consult to assist patient with appropriate food choices  Goal: Maintains adequate nutritional intake  Outcome:

## 2024-11-06 NOTE — PROGRESS NOTES
Hospitalist Progress Note      Patient:  Monique Donato 69 y.o. female     Unit/Bed:[facility]20/020-A    Date of Admission: 11/5/2024      ASSESSMENT AND PLAN    Active Problems  Hematemesis, diarrhea (resolved), melena  CT of the abdomen and pelvis with fluid within the nondilated small and large bowel loops which could indicate diarrheal illness.  GI consulted  EGD planned 11/6 1630  Protonix infusion initiated  Trend H&H every 8 hour  Hgb has remained stable and actually increased  GI panel pending  NPO  High anion gap metabolic acidosis  Like secondary to lactic acidosis.  Trend lactic acid.  Continue IV fluid resuscitation.  Recheck BMP tomorrow.    Resolved Problems  Hypomagnesemia  Magnesium 1.5.  Replace and recheck    Chronic Conditions (reviewed and stable unless otherwise stated)  Gastroparesis  On Reglan as needed.  Generalized anxiety disorder  Continue home lorazepam as needed  Hypothyroidism  Continue Synthroid  Hypertension  Continue propranolol.  Hold lisinopril due to mild elevation in creatinine.  Hyperlipidemia  On atorvastatin      LDA: []CVC / []PICC / []Midline / []Morrison / []Drains / []Mediport / [x]None  Antibiotics:   Steroids:   Labs (still needed?): [x]Yes / []No  IVF (still needed?): []Yes / [x]No    Level of care: []Step Down / [x]Med-Surg  Bed Status: [x]Inpatient / []Observation  Telemetry: [x]Yes / []No  PT/OT: []Yes / [x]No    DVT Prophylaxis: [] Lovenox / [] Heparin / [x] SCDs / [] Already on Systemic Anticoagulation / [] None   Code status: Full Code     Expected discharge date:  tomorrow    Disposition: Home     ===================================================================    Chief Complaint: Vomiting, diarrhea   Subjective (past 24 hours):   11/6: pt sitting up in bed. No reports of further diarrhea/emesis. Hgb stable. EGD planned today. Denies fever/chills, CP/SOB/nausea/abd pain.       HPI / Hospital Course:  \"Monique Wangki is a 69 y.o. female with a history of

## 2024-11-07 VITALS
HEIGHT: 65 IN | DIASTOLIC BLOOD PRESSURE: 70 MMHG | WEIGHT: 150.79 LBS | RESPIRATION RATE: 16 BRPM | BODY MASS INDEX: 25.12 KG/M2 | HEART RATE: 84 BPM | TEMPERATURE: 98 F | SYSTOLIC BLOOD PRESSURE: 123 MMHG | OXYGEN SATURATION: 97 %

## 2024-11-07 LAB
ANION GAP SERPL CALC-SCNC: 11 MEQ/L (ref 8–16)
BUN SERPL-MCNC: 9 MG/DL (ref 7–22)
CALCIUM SERPL-MCNC: 8.3 MG/DL (ref 8.5–10.5)
CHLORIDE SERPL-SCNC: 106 MEQ/L (ref 98–111)
CO2 SERPL-SCNC: 22 MEQ/L (ref 23–33)
CREAT SERPL-MCNC: 0.7 MG/DL (ref 0.4–1.2)
DEPRECATED RDW RBC AUTO: 43.8 FL (ref 35–45)
ERYTHROCYTE [DISTWIDTH] IN BLOOD BY AUTOMATED COUNT: 13.3 % (ref 11.5–14.5)
GFR SERPL CREATININE-BSD FRML MDRD: > 90 ML/MIN/1.73M2
GLUCOSE BLD STRIP.AUTO-MCNC: 151 MG/DL (ref 70–108)
GLUCOSE SERPL-MCNC: 187 MG/DL (ref 70–108)
HCT VFR BLD AUTO: 36.1 % (ref 37–47)
HGB BLD-MCNC: 11.4 GM/DL (ref 12–16)
MCH RBC QN AUTO: 28 PG (ref 26–33)
MCHC RBC AUTO-ENTMCNC: 31.6 GM/DL (ref 32.2–35.5)
MCV RBC AUTO: 88.7 FL (ref 81–99)
PLATELET # BLD AUTO: 209 THOU/MM3 (ref 130–400)
PMV BLD AUTO: 9.8 FL (ref 9.4–12.4)
POTASSIUM SERPL-SCNC: 3.9 MEQ/L (ref 3.5–5.2)
RBC # BLD AUTO: 4.07 MILL/MM3 (ref 4.2–5.4)
SODIUM SERPL-SCNC: 139 MEQ/L (ref 135–145)
WBC # BLD AUTO: 6.6 THOU/MM3 (ref 4.8–10.8)

## 2024-11-07 PROCEDURE — G0378 HOSPITAL OBSERVATION PER HR: HCPCS

## 2024-11-07 PROCEDURE — 6370000000 HC RX 637 (ALT 250 FOR IP): Performed by: PHYSICIAN ASSISTANT

## 2024-11-07 PROCEDURE — 80048 BASIC METABOLIC PNL TOTAL CA: CPT

## 2024-11-07 PROCEDURE — 82948 REAGENT STRIP/BLOOD GLUCOSE: CPT

## 2024-11-07 PROCEDURE — 36415 COLL VENOUS BLD VENIPUNCTURE: CPT

## 2024-11-07 PROCEDURE — 85027 COMPLETE CBC AUTOMATED: CPT

## 2024-11-07 PROCEDURE — 99239 HOSP IP/OBS DSCHRG MGMT >30: CPT | Performed by: PHYSICIAN ASSISTANT

## 2024-11-07 RX ORDER — PANTOPRAZOLE SODIUM 40 MG/1
40 TABLET, DELAYED RELEASE ORAL
Qty: 90 TABLET | Refills: 1 | Status: SHIPPED | OUTPATIENT
Start: 2024-11-07

## 2024-11-07 RX ORDER — VENLAFAXINE HYDROCHLORIDE 150 MG/1
150 TABLET, EXTENDED RELEASE ORAL
COMMUNITY

## 2024-11-07 RX ORDER — LANOLIN ALCOHOL/MO/W.PET/CERES
100 CREAM (GRAM) TOPICAL DAILY
Qty: 30 TABLET | Refills: 3 | Status: SHIPPED | OUTPATIENT
Start: 2024-11-08

## 2024-11-07 RX ORDER — HYDROXYZINE HYDROCHLORIDE 50 MG/1
50 TABLET, FILM COATED ORAL NIGHTLY PRN
COMMUNITY

## 2024-11-07 RX ORDER — FENOFIBRATE 54 MG/1
54 TABLET ORAL DAILY
COMMUNITY

## 2024-11-07 RX ORDER — AMLODIPINE BESYLATE 5 MG/1
5 TABLET ORAL DAILY
COMMUNITY

## 2024-11-07 RX ADMIN — LORAZEPAM 1 MG: 1 TABLET ORAL at 07:48

## 2024-11-07 RX ADMIN — OXYBUTYNIN CHLORIDE 5 MG: 5 TABLET ORAL at 07:48

## 2024-11-07 RX ADMIN — PROPRANOLOL HYDROCHLORIDE 10 MG: 10 TABLET ORAL at 07:47

## 2024-11-07 RX ADMIN — Medication 100 MG: at 07:47

## 2024-11-07 RX ADMIN — LEVOTHYROXINE SODIUM 112 MCG: 0.11 TABLET ORAL at 05:59

## 2024-11-07 NOTE — PROGRESS NOTES
Protonix drip stopped per DR. Ludin Martin, patient removed peripheral lines, stated they bothered her. Notified Dr. Martin if wanted new peripheral IV placed, patient discharging tomorrow, . Response was no. Patient resting quietly at this present time, agreed to wear SCDS and have bed alarm on, and agreed to use call light if need to use the bathroom. Bed alarm on call light in reach.

## 2024-11-07 NOTE — PLAN OF CARE
Problem: ABCDS Injury Assessment  Goal: Absence of physical injury  Outcome: Progressing  Flowsheets (Taken 11/7/2024 0048)  Absence of Physical Injury: Implement safety measures based on patient assessment  Note: Patient has remained free of physical injury during this shift. Safe environment provided, call light within reach, and hourly rounding completed.      Problem: Safety - Adult  Goal: Free from fall injury  Outcome: Progressing  Flowsheets (Taken 11/7/2024 0048)  Free From Fall Injury: Instruct family/caregiver on patient safety  Note: Patient has remained free of physical injury during this shift. Safe environment provided, call light within reach, and hourly rounding completed.      Problem: Pain  Goal: Verbalizes/displays adequate comfort level or baseline comfort level  Outcome: Progressing  Flowsheets (Taken 11/7/2024 0048)  Verbalizes/displays adequate comfort level or baseline comfort level: Encourage patient to monitor pain and request assistance     Problem: Discharge Planning  Goal: Discharge to home or other facility with appropriate resources  Outcome: Progressing  Flowsheets (Taken 11/7/2024 0048)  Discharge to home or other facility with appropriate resources: Identify barriers to discharge with patient and caregiver   Care plan reviewed with patient.  Patient  verbalize understanding of the plan of care and contribute to goal setting.

## 2024-11-07 NOTE — CARE COORDINATION

## 2024-11-07 NOTE — PROGRESS NOTES
Pharmacy Medication History Note    List of current medications patient is taking is complete.    Source of information: Patient, surescripts    Changes made to medication list:  Medications removed (include reason, ex. therapy complete or physician discontinued):  none    Medications added/doses adjusted:  Adjusted Synthroid 100mcg PO daily to 112mcg PO daily  Added:  Amlodipine 5mg PO daily  Fenofibrate 54mg PO daily  Hydroxyzine 50mg PO prn for sleep  Venlafaxine 150mg PO daily with breakfast    Other notes (ex. Recent course of antibiotics, Coumadin dosing):  Patient not taking aspirin 81mg  Documented last known doses  Denies use of other OTC or herbal medications.    Allergies reviewed    Electronically signed by Kar Lozano on 11/7/2024 at 11:12 AM

## 2024-11-07 NOTE — PLAN OF CARE
Problem: ABCDS Injury Assessment  Goal: Absence of physical injury  11/7/2024 1148 by Kiersten Mathews RN  Outcome: Adequate for Discharge     Problem: Safety - Adult  Goal: Free from fall injury  11/7/2024 1148 by Kiersten Mathews RN  Outcome: Adequate for Discharge     Problem: Pain  Goal: Verbalizes/displays adequate comfort level or baseline comfort level  11/7/2024 1148 by Kiersten Mathews RN  Outcome: Adequate for Discharge     Problem: Gastrointestinal - Adult  Goal: Minimal or absence of nausea and vomiting  11/7/2024 1148 by Kiersten Mathews RN  Outcome: Adequate for Discharge     Problem: Gastrointestinal - Adult  Goal: Maintains or returns to baseline bowel function  11/7/2024 1148 by Kiersten Mathews RN  Outcome: Adequate for Discharge     Problem: Gastrointestinal - Adult  Goal: Maintains adequate nutritional intake  11/7/2024 1148 by Kiersten Mathews RN  Outcome: Adequate for Discharge     Problem: Metabolic/Fluid and Electrolytes - Adult  Goal: Electrolytes maintained within normal limits  11/7/2024 1148 by Kiersten Mathews RN  Outcome: Adequate for Discharge     Problem: Discharge Planning  Goal: Discharge to home or other facility with appropriate resources  11/7/2024 1148 by Kiersten Mathews RN  Outcome: Adequate for Discharge   Care plan reviewed with patient.  Patient verbalizes understanding of the plan of care and contribute to goal setting.

## 2024-11-07 NOTE — PROCEDURES
[address] [address]                             PROCEDURE NOTE      PATIENT NAME: MARIANO DODSON           : 1955  MED REC NO: [id number]                       ROOM: [facility]  ACCOUNT NO: [id number]                       ADMIT DATE: 2024  PROVIDER: Dmitriy Martin MD      DATE OF PROCEDURE:  2024    SURGEON:  Dmitriy Martin MD    PROCEDURE:  Esophagogastroduodenoscopy.    INDICATION:  69-year-old pleasant female, who is feeling abdominal pain, generalized anxiety disorder, chronic alcohol use, who presented to the hospital with hematemesis and dark stools.  The patient had blood workup which showed WBC 9.3, hemoglobin 14.6, platelet count 299,000.  She is undergoing further evaluation.    PHYSICAL EXAMINATION:  VITAL SIGNS:  Blood pressure 124/76, pulse 111, temperature 98.9, respiratory rate 18.   HEART:  Regular.  Normal S1 and S2.  No S3.  LUNGS:  Equal to expansion on inspection.  Fair air entry bilaterally.  ABDOMEN:  Soft.  Normoactive bowel sounds.  Nontender.  Not distended.    ASA CLASSIFICATION:  As per Anesthesia.  Please see Anesthesia report for details.    INSTRUMENT:  GIF- gastroscope.    PHOTOGRAPHS:  Yes.    BIOPSIES:  No.    ESTIMATED BLOOD LOSS:  None.    CONSENT:  Procedure indications and complications including but not limited to perforation, bleeding, infection, adverse reaction to medicine, very slight chance of missing significant lesions discussed with the patient, and the patient expressed her understanding, and a written consent was obtained.    DESCRIPTION OF PROCEDURE:  The patient was placed in the left lateral decubitus position in the endo room #13.  The patient was placed on appropriate monitoring of vitals including blood pressure, heart rate, and pulse ox.  Oropharynx was sprayed with Cetacaine spray, and bite block was placed between maxilla and mandible.  After the adequate  total intravenous anesthesia was given by Anesthesia, the GIF- gastroscope was inserted into the oropharynx, and the esophagus was intubated under direct vision.  The scope was advanced down through the stomach into 2nd portion of duodenum.  On careful inspection and on withdrawal of the scope, the duodenum looked normal.  Antrum of the stomach looked normal.  Lesser and greater curvature and body of the stomach looked normal.  On retroflex, fundus looked normal.  Distal and proximal esophagus looked normal.  No varices noted.  No bleeding noted.  No stigmata of bleeding noted.  Air was withdrawn as the scope was removed.  The patient tolerated the procedure well without any immediate complications.  Vitals including blood pressure, heart rate, and pulse ox were stable during the procedure and after the procedure.  The patient was transferred to the recovery room in stable condition.    IMPRESSION:  Esophagogastroduodenoscopy to the second portion of duodenum.  No bleeding noted.  No stigmata of bleeding noted.    RECOMMENDATIONS:  Stop the Protonix IV.  Protonix 40 mg p.o. daily.  Advance diet as tolerated.  Okay to discharge home.  Please call me if any further GI problems.          ELEUTERIO FRANZ MD      D:  11/06/2024 16:49:01     T:  11/06/2024 20:57:28     KYLE/JONATHAN  Job #:  [id number]     Doc#:  [id number]    CC:   MD Eleuterio Scott MD Amanda Misenko, PA-C

## 2024-11-07 NOTE — PROGRESS NOTES
Discharge education and instructions provided. Patient verbalized understanding at this time. No questions asked. IV access removed. Patient transported to the  in the main lobby to obtain her money. Money was obtained. All personal belongings, AVS and new medications present with patient. Patients home medications that were brought in with patient also given back to patient.  Transport to main lobby.

## 2024-11-13 NOTE — DISCHARGE SUMMARY
Hospitalist Discharge Note      Patient:  Monique Donato    Unit/Bed:5K-20/020-A  YOB: 1955  MRN: [id number]   Acct: [id number]     PCP: Kb Saleh MD  Date of Admission: 11/5/2024      Discharge date: 11/7/2024  2:12 PM    Chief Complaint on presentation :-  Vomiting, diarrhea     Discharge Assessment and Plan:-   Hematemesis, diarrhea (resolved), melena - ruled out GI bleeding   CT of the abdomen and pelvis with fluid within the nondilated small and large bowel loops which could indicate diarrheal illness.  GI consulted  EGD 11/6 with no bleeding noted nor stigmata.  Protonix infusion transitioned to protonix 40 mg PO daily   GI panel pending  Diet advanced following scope - tolerated without issue     Non anion gap metabolic acidosis  Like secondary to lactic acidosis, improved   Continue IV fluid resuscitation.  Recheck BMP tomorrow.    Normocytic anemia:  Hgb 14.6 on admit, down to 11.4 - suspect this is error. Recommend outpatient follow up with PCP for repeat lab work    Resolved Problems  Hypomagnesemia  Magnesium 1.5.  Replace and recheck     Chronic Conditions (reviewed and stable unless otherwise stated)  Gastroparesis  On Reglan as needed.  Generalized anxiety disorder  Continue home lorazepam as needed  Hypothyroidism  Continue Synthroid  Hypertension  Continue propranolol.  Hold lisinopril due to mild elevation in creatinine.  Hyperlipidemia  On atorvastatin       Initial H and P and Hospital course:-  \"Monique Donato is a 69 y.o. female with a history of generalized anxiety disorder, chronic alcohol use, hypothyroidism, hypertension, and hyperlipidemia who presented to [facility] with chief complaint of vomiting and diarrhea.  History is provided by the patient as well as her boyfriend who is present at bedside.  They report that the patient has some chronic diarrhea.  However, over the last few days, her diarrhea has worsened and her stools have become

## 2024-12-11 ENCOUNTER — OFFICE VISIT (OUTPATIENT)
Dept: CARDIOLOGY CLINIC | Age: 69
End: 2024-12-11
Payer: MEDICARE

## 2024-12-11 VITALS
SYSTOLIC BLOOD PRESSURE: 112 MMHG | HEART RATE: 104 BPM | HEIGHT: 65 IN | DIASTOLIC BLOOD PRESSURE: 64 MMHG | BODY MASS INDEX: 23.82 KG/M2 | WEIGHT: 143 LBS

## 2024-12-11 DIAGNOSIS — R94.31 ABNORMAL ECG: ICD-10-CM

## 2024-12-11 DIAGNOSIS — E78.01 FAMILIAL HYPERCHOLESTEROLEMIA: ICD-10-CM

## 2024-12-11 DIAGNOSIS — I10 PRIMARY HYPERTENSION: Primary | ICD-10-CM

## 2024-12-11 PROCEDURE — 99204 OFFICE O/P NEW MOD 45 MIN: CPT | Performed by: NUCLEAR MEDICINE

## 2024-12-11 PROCEDURE — 1123F ACP DISCUSS/DSCN MKR DOCD: CPT | Performed by: NUCLEAR MEDICINE

## 2024-12-11 PROCEDURE — 3078F DIAST BP <80 MM HG: CPT | Performed by: NUCLEAR MEDICINE

## 2024-12-11 PROCEDURE — 1159F MED LIST DOCD IN RCRD: CPT | Performed by: NUCLEAR MEDICINE

## 2024-12-11 PROCEDURE — 3074F SYST BP LT 130 MM HG: CPT | Performed by: NUCLEAR MEDICINE

## 2024-12-11 ASSESSMENT — ENCOUNTER SYMPTOMS
PHOTOPHOBIA: 0
CHEST TIGHTNESS: 0
ABDOMINAL DISTENTION: 0
CONSTIPATION: 0
BACK PAIN: 0
VOMITING: 0
BLOOD IN STOOL: 0
ABDOMINAL PAIN: 0
NAUSEA: 0
ANAL BLEEDING: 0
SHORTNESS OF BREATH: 1
COLOR CHANGE: 0
RECTAL PAIN: 0
DIARRHEA: 0

## 2024-12-11 NOTE — PROGRESS NOTES
[facility] [place] [facility]  [facility] CARDIOLOGY  [address]  [address]  [place] [address]  Dept: [phone]  Dept Fax: [phone]  Loc: [phone]    Visit Date: 12/11/2024    Monique Donato is a 69 y.o. female who presents todayfor:  Chief Complaint   Patient presents with    New Patient    Establish Cardiologist    Hypertension    Hyperlipidemia   Here from the ER  Was there few weeks ago   Was nausea vomiting   Had work up in the ER  No obvious chest pain per the patient   Looks like she might have had some ECG changes that prompted this visit  Does have risk factors for CAd  Does have HTN and hyperlipidemia  Some baseline dyspnea  No chest pain   Exertional dyspnea  No smoking   Family history of CAd not bad         HPI:  Hypertension  Associated symptoms include shortness of breath. Pertinent negatives include no chest pain, neck pain or palpitations.   Hyperlipidemia  Associated symptoms include shortness of breath. Pertinent negatives include no chest pain or myalgias.     Past Medical History:   Diagnosis Date    Anxiety     COPD (chronic obstructive pulmonary disease) (HCC)     GERD (gastroesophageal reflux disease)     Hyperlipidemia     Hypertension     Obstructive pneumonia due to foreign body aspiration 3/27/2023    Psychiatric problem     Seizures (HCC)     Thyroid disease       Past Surgical History:   Procedure Laterality Date    ABDOMEN SURGERY      csection x 2    BRONCHOSCOPY N/A 03/29/2023    BRONCHOSCOPY TO REMOVE FOREIGN BODY performed by Tres Castellanos MD at [facility]    COLONOSCOPY      EYE SURGERY  2010 approx    cataract removal    INOCENCIA STEROTACTIC LOC BREAST BIOPSY LEFT Left 2012    neg    INOCENCIA STEROTACTIC LOC BREAST BIOPSY RIGHT Right 2019    neg    UPPER GASTROINTESTINAL ENDOSCOPY N/A 11/6/2024    EGD performed by Dmitriy Martin MD at [facility] ENDOSCOPY     Family History   Problem Relation Age of Onset    Heart Disease Mother     High

## 2024-12-30 ENCOUNTER — HOSPITAL ENCOUNTER (OUTPATIENT)
Dept: NUCLEAR MEDICINE | Age: 69
Discharge: HOME | End: 2024-12-30
Attending: NUCLEAR MEDICINE
Payer: MEDICARE

## 2024-12-30 ENCOUNTER — HOSPITAL ENCOUNTER (OUTPATIENT)
Age: 69
Discharge: HOME | End: 2025-01-01
Attending: NUCLEAR MEDICINE
Payer: MEDICARE

## 2024-12-30 VITALS — BODY MASS INDEX: 23.99 KG/M2 | WEIGHT: 144 LBS | HEIGHT: 65 IN

## 2024-12-30 DIAGNOSIS — E78.01 FAMILIAL HYPERCHOLESTEROLEMIA: ICD-10-CM

## 2024-12-30 DIAGNOSIS — R94.31 ABNORMAL ECG: ICD-10-CM

## 2024-12-30 DIAGNOSIS — I10 PRIMARY HYPERTENSION: ICD-10-CM

## 2024-12-30 LAB
ECHO AO ASC DIAM: 3.5 CM
ECHO AV CUSP MM: 2.1 CM
ECHO AV MEAN GRADIENT: 4 MMHG
ECHO AV MEAN VELOCITY: 1 M/S
ECHO AV PEAK GRADIENT: 6 MMHG
ECHO AV PEAK VELOCITY: 1.3 M/S
ECHO AV VELOCITY RATIO: 0.62
ECHO AV VTI: 27.2 CM
ECHO BSA: 1.73 M2
ECHO EST RA PRESSURE: 3 MMHG
ECHO LA AREA 2C: 19.2 CM2
ECHO LA AREA 4C: 18.8 CM2
ECHO LA DIAMETER: 3.8 CM
ECHO LA MAJOR AXIS: 5.1 CM
ECHO LA MINOR AXIS: 5.1 CM
ECHO LA VOL BP: 55 ML (ref 22–52)
ECHO LA VOL MOD A2C: 58 ML (ref 22–52)
ECHO LA VOL MOD A4C: 53 ML (ref 22–52)
ECHO LV E' LATERAL VELOCITY: 8.3 CM/S
ECHO LV E' SEPTAL VELOCITY: 6.9 CM/S
ECHO LV EDV A2C: 67 ML
ECHO LV EDV A4C: 99 ML
ECHO LV EJECTION FRACTION A2C: 47 %
ECHO LV EJECTION FRACTION A4C: 44 %
ECHO LV EJECTION FRACTION BIPLANE: 47 % (ref 55–100)
ECHO LV ESV A2C: 35 ML
ECHO LV ESV A4C: 55 ML
ECHO LV FRACTIONAL SHORTENING: 23 % (ref 28–44)
ECHO LV INTERNAL DIMENSION DIASTOLIC: 4 CM (ref 3.9–5.3)
ECHO LV INTERNAL DIMENSION SYSTOLIC: 3.1 CM
ECHO LV ISOVOLUMETRIC RELAXATION TIME (IVRT): 67 MS
ECHO LV IVSD: 1 CM (ref 0.6–0.9)
ECHO LV MASS 2D: 127.1 G (ref 67–162)
ECHO LV POSTERIOR WALL DIASTOLIC: 1 CM (ref 0.6–0.9)
ECHO LV RELATIVE WALL THICKNESS RATIO: 0.5
ECHO LVOT AV VTI INDEX: 0.67
ECHO LVOT MEAN GRADIENT: 2 MMHG
ECHO LVOT PEAK GRADIENT: 3 MMHG
ECHO LVOT PEAK VELOCITY: 0.8 M/S
ECHO LVOT VTI: 18.2 CM
ECHO MV A VELOCITY: 1.15 M/S
ECHO MV E DECELERATION TIME (DT): 149 MS
ECHO MV E VELOCITY: 0.85 M/S
ECHO MV E/A RATIO: 0.74
ECHO MV E/E' LATERAL: 10.24
ECHO MV E/E' RATIO (AVERAGED): 11.28
ECHO MV E/E' SEPTAL: 12.32
ECHO PULMONARY ARTERY END DIASTOLIC PRESSURE: 9 MMHG
ECHO PV MAX VELOCITY: 0.7 M/S
ECHO PV PEAK GRADIENT: 2 MMHG
ECHO PV REGURGITANT MAX VELOCITY: 1.5 M/S
ECHO RV INTERNAL DIMENSION: 2.9 CM
ECHO RV TAPSE: 1.4 CM (ref 1.7–?)
ECHO TV E WAVE: 0.5 M/S
NUC STRESS EJECTION FRACTION: 62 %
STRESS BASELINE DIAS BP: 71 MMHG
STRESS BASELINE HR: 107 BPM
STRESS BASELINE SYS BP: 139 MMHG
STRESS ESTIMATED WORKLOAD: 4.6 METS
STRESS EXERCISE DUR MIN: 3 MIN
STRESS EXERCISE DUR SEC: 40 SEC
STRESS PEAK DIAS BP: 93 MMHG
STRESS PEAK SYS BP: 198 MMHG
STRESS PERCENT HR ACHIEVED: 191 %
STRESS POST PEAK HR: 292 BPM
STRESS RATE PRESSURE PRODUCT: NORMAL BPM*MMHG
STRESS STAGE 1 DURATION: NORMAL MIN:SEC
STRESS STAGE 1 HR: 139 BPM
STRESS STAGE RECOVERY 1 BP: NORMAL MMHG
STRESS STAGE RECOVERY 1 DURATION: 1 MIN:SEC
STRESS STAGE RECOVERY 1 HR: 139 BPM
STRESS STAGE RECOVERY 2 BP: NORMAL MMHG
STRESS STAGE RECOVERY 2 DURATION: 1 MIN:SEC
STRESS STAGE RECOVERY 2 HR: 122 BPM
STRESS STAGE RECOVERY 3 BP: NORMAL MMHG
STRESS STAGE RECOVERY 3 DURATION: 2 MIN:SEC
STRESS STAGE RECOVERY 3 HR: 110 BPM
STRESS STAGE RECOVERY 4 BP: NORMAL MMHG
STRESS STAGE RECOVERY 4 DURATION: 3 MIN:SEC
STRESS STAGE RECOVERY 4 HR: 101 BPM
STRESS TARGET HR: 153 BPM

## 2024-12-30 PROCEDURE — 78452 HT MUSCLE IMAGE SPECT MULT: CPT

## 2024-12-30 PROCEDURE — 93016 CV STRESS TEST SUPVJ ONLY: CPT | Performed by: NUCLEAR MEDICINE

## 2024-12-30 PROCEDURE — 93018 CV STRESS TEST I&R ONLY: CPT | Performed by: NUCLEAR MEDICINE

## 2024-12-30 PROCEDURE — 93017 CV STRESS TEST TRACING ONLY: CPT

## 2024-12-30 PROCEDURE — 78452 HT MUSCLE IMAGE SPECT MULT: CPT | Performed by: NUCLEAR MEDICINE

## 2024-12-30 PROCEDURE — 93306 TTE W/DOPPLER COMPLETE: CPT

## 2024-12-30 PROCEDURE — 3430000000 HC RX DIAGNOSTIC RADIOPHARMACEUTICAL: Performed by: NUCLEAR MEDICINE

## 2024-12-30 PROCEDURE — 93306 TTE W/DOPPLER COMPLETE: CPT | Performed by: NUCLEAR MEDICINE

## 2024-12-30 PROCEDURE — A9500 TC99M SESTAMIBI: HCPCS | Performed by: NUCLEAR MEDICINE

## 2024-12-30 RX ORDER — TETRAKIS(2-METHOXYISOBUTYLISOCYANIDE)COPPER(I) TETRAFLUOROBORATE 1 MG/ML
33.6 INJECTION, POWDER, LYOPHILIZED, FOR SOLUTION INTRAVENOUS
Status: COMPLETED | OUTPATIENT
Start: 2024-12-30 | End: 2024-12-30

## 2024-12-30 RX ORDER — TETRAKIS(2-METHOXYISOBUTYLISOCYANIDE)COPPER(I) TETRAFLUOROBORATE 1 MG/ML
9.5 INJECTION, POWDER, LYOPHILIZED, FOR SOLUTION INTRAVENOUS
Status: COMPLETED | OUTPATIENT
Start: 2024-12-30 | End: 2024-12-30

## 2024-12-30 RX ADMIN — Medication 33.6 MILLICURIE: at 13:43

## 2024-12-30 RX ADMIN — Medication 9.5 MILLICURIE: at 12:17

## 2025-04-04 ENCOUNTER — HOSPITAL ENCOUNTER (OUTPATIENT)
Dept: GENERAL RADIOLOGY | Age: 70
Discharge: HOME | End: 2025-04-04
Payer: MEDICARE

## 2025-04-04 ENCOUNTER — HOSPITAL ENCOUNTER (OUTPATIENT)
Age: 70
Discharge: HOME | End: 2025-04-04
Payer: MEDICARE

## 2025-04-04 DIAGNOSIS — R05.1 ACUTE COUGH: ICD-10-CM

## 2025-04-04 PROCEDURE — 71046 X-RAY EXAM CHEST 2 VIEWS: CPT

## 2025-06-06 ENCOUNTER — TRANSCRIBE ORDERS (OUTPATIENT)
Dept: ADMINISTRATIVE | Age: 70
End: 2025-06-06

## 2025-06-06 DIAGNOSIS — Z12.31 OTHER SCREENING MAMMOGRAM: Primary | ICD-10-CM

## 2025-06-17 ENCOUNTER — HOSPITAL ENCOUNTER (OUTPATIENT)
Dept: MAMMOGRAPHY | Age: 70
Discharge: HOME | End: 2025-06-17
Payer: MEDICARE

## 2025-06-17 VITALS — WEIGHT: 144 LBS | BODY MASS INDEX: 23.96 KG/M2

## 2025-06-17 DIAGNOSIS — Z12.31 OTHER SCREENING MAMMOGRAM: ICD-10-CM

## 2025-06-17 DIAGNOSIS — Z12.31 VISIT FOR SCREENING MAMMOGRAM: ICD-10-CM

## 2025-06-17 PROCEDURE — 77063 BREAST TOMOSYNTHESIS BI: CPT

## (undated) DEVICE — AIRWAY RETRIEVAL BASKET: Brand: ZERO TIP